# Patient Record
Sex: FEMALE | Race: ASIAN | NOT HISPANIC OR LATINO | Employment: UNEMPLOYED
[De-identification: names, ages, dates, MRNs, and addresses within clinical notes are randomized per-mention and may not be internally consistent; named-entity substitution may affect disease eponyms.]

---

## 2017-01-13 ENCOUNTER — RECORDS - HEALTHEAST (OUTPATIENT)
Dept: ADMINISTRATIVE | Facility: OTHER | Age: 52
End: 2017-01-13

## 2017-05-05 ENCOUNTER — RECORDS - HEALTHEAST (OUTPATIENT)
Dept: ADMINISTRATIVE | Facility: OTHER | Age: 52
End: 2017-05-05

## 2017-09-15 ENCOUNTER — RECORDS - HEALTHEAST (OUTPATIENT)
Dept: ADMINISTRATIVE | Facility: OTHER | Age: 52
End: 2017-09-15

## 2017-12-22 ENCOUNTER — RECORDS - HEALTHEAST (OUTPATIENT)
Dept: ADMINISTRATIVE | Facility: OTHER | Age: 52
End: 2017-12-22

## 2018-05-11 ENCOUNTER — OFFICE VISIT - HEALTHEAST (OUTPATIENT)
Dept: FAMILY MEDICINE | Facility: CLINIC | Age: 53
End: 2018-05-11

## 2018-05-11 DIAGNOSIS — Z76.89 ENCOUNTER TO ESTABLISH CARE: ICD-10-CM

## 2018-05-11 DIAGNOSIS — M54.6 THORACIC BACK PAIN: ICD-10-CM

## 2018-05-11 ASSESSMENT — MIFFLIN-ST. JEOR: SCORE: 1157.91

## 2018-06-22 ENCOUNTER — OFFICE VISIT - HEALTHEAST (OUTPATIENT)
Dept: FAMILY MEDICINE | Facility: CLINIC | Age: 53
End: 2018-06-22

## 2018-06-22 ENCOUNTER — COMMUNICATION - HEALTHEAST (OUTPATIENT)
Dept: FAMILY MEDICINE | Facility: CLINIC | Age: 53
End: 2018-06-22

## 2018-06-22 ENCOUNTER — RECORDS - HEALTHEAST (OUTPATIENT)
Dept: GENERAL RADIOLOGY | Facility: CLINIC | Age: 53
End: 2018-06-22

## 2018-06-22 DIAGNOSIS — Z12.11 SCREEN FOR COLON CANCER: ICD-10-CM

## 2018-06-22 DIAGNOSIS — Z12.31 VISIT FOR SCREENING MAMMOGRAM: ICD-10-CM

## 2018-06-22 DIAGNOSIS — M54.6 MIDLINE THORACIC BACK PAIN: ICD-10-CM

## 2018-06-22 DIAGNOSIS — Z00.00 PHYSICAL EXAM: ICD-10-CM

## 2018-06-22 DIAGNOSIS — Z13.220 SCREENING FOR LIPID DISORDERS: ICD-10-CM

## 2018-06-22 DIAGNOSIS — R20.8 BURNING SENSATION: ICD-10-CM

## 2018-06-22 DIAGNOSIS — Z78.0 MENOPAUSE: ICD-10-CM

## 2018-06-22 DIAGNOSIS — I10 HYPERTENSION: ICD-10-CM

## 2018-06-22 DIAGNOSIS — M54.6 PAIN IN THORACIC SPINE: ICD-10-CM

## 2018-06-22 LAB
ALBUMIN SERPL-MCNC: 3.7 G/DL (ref 3.5–5)
ALP SERPL-CCNC: 80 U/L (ref 45–120)
ALT SERPL W P-5'-P-CCNC: 17 U/L (ref 0–45)
ANION GAP SERPL CALCULATED.3IONS-SCNC: 10 MMOL/L (ref 5–18)
AST SERPL W P-5'-P-CCNC: 18 U/L (ref 0–40)
BILIRUB SERPL-MCNC: 0.2 MG/DL (ref 0–1)
BUN SERPL-MCNC: 30 MG/DL (ref 8–22)
CALCIUM SERPL-MCNC: 9.3 MG/DL (ref 8.5–10.5)
CHLORIDE BLD-SCNC: 105 MMOL/L (ref 98–107)
CHOLEST SERPL-MCNC: 292 MG/DL
CO2 SERPL-SCNC: 26 MMOL/L (ref 22–31)
CREAT SERPL-MCNC: 1.39 MG/DL (ref 0.6–1.1)
FASTING STATUS PATIENT QL REPORTED: ABNORMAL
GFR SERPL CREATININE-BSD FRML MDRD: 40 ML/MIN/1.73M2
GLUCOSE BLD-MCNC: 98 MG/DL (ref 70–125)
HDLC SERPL-MCNC: 57 MG/DL
LDLC SERPL CALC-MCNC: 205 MG/DL
POTASSIUM BLD-SCNC: 4.2 MMOL/L (ref 3.5–5)
PROT SERPL-MCNC: 7.5 G/DL (ref 6–8)
SODIUM SERPL-SCNC: 141 MMOL/L (ref 136–145)
TRIGL SERPL-MCNC: 152 MG/DL
TSH SERPL DL<=0.005 MIU/L-ACNC: 1.28 UIU/ML (ref 0.3–5)

## 2018-06-22 ASSESSMENT — MIFFLIN-ST. JEOR: SCORE: 1150.2

## 2018-06-25 ENCOUNTER — COMMUNICATION - HEALTHEAST (OUTPATIENT)
Dept: FAMILY MEDICINE | Facility: CLINIC | Age: 53
End: 2018-06-25

## 2018-07-13 ENCOUNTER — HOSPITAL ENCOUNTER (OUTPATIENT)
Dept: MAMMOGRAPHY | Facility: CLINIC | Age: 53
Discharge: HOME | End: 2018-07-13
Attending: FAMILY MEDICINE

## 2018-07-13 ENCOUNTER — COMMUNICATION - HEALTHEAST (OUTPATIENT)
Dept: TELEHEALTH | Facility: CLINIC | Age: 53
End: 2018-07-13

## 2018-07-13 ENCOUNTER — COMMUNICATION - HEALTHEAST (OUTPATIENT)
Dept: FAMILY MEDICINE | Facility: CLINIC | Age: 53
End: 2018-07-13

## 2018-07-13 DIAGNOSIS — Z12.31 VISIT FOR SCREENING MAMMOGRAM: ICD-10-CM

## 2018-09-11 ENCOUNTER — OFFICE VISIT - HEALTHEAST (OUTPATIENT)
Dept: FAMILY MEDICINE | Facility: CLINIC | Age: 53
End: 2018-09-11

## 2018-09-11 DIAGNOSIS — R19.7 DIARRHEA: ICD-10-CM

## 2018-09-11 DIAGNOSIS — R11.0 NAUSEA: ICD-10-CM

## 2018-09-14 ENCOUNTER — COMMUNICATION - HEALTHEAST (OUTPATIENT)
Dept: LAB | Facility: CLINIC | Age: 53
End: 2018-09-14

## 2018-09-14 ENCOUNTER — AMBULATORY - HEALTHEAST (OUTPATIENT)
Dept: LAB | Facility: CLINIC | Age: 53
End: 2018-09-14

## 2018-09-14 DIAGNOSIS — R19.7 DIARRHEA: ICD-10-CM

## 2018-09-15 LAB
SHIGA TOXIN 1: NEGATIVE
SHIGA TOXIN 2: NEGATIVE

## 2018-09-17 LAB
BACTERIA SPEC CULT: NORMAL
O+P STL MICRO: NORMAL

## 2019-04-12 ENCOUNTER — OFFICE VISIT - HEALTHEAST (OUTPATIENT)
Dept: FAMILY MEDICINE | Facility: CLINIC | Age: 54
End: 2019-04-12

## 2019-04-12 DIAGNOSIS — I10 ESSENTIAL HYPERTENSION: ICD-10-CM

## 2019-04-12 DIAGNOSIS — Z12.11 SCREEN FOR COLON CANCER: ICD-10-CM

## 2019-04-12 DIAGNOSIS — E78.2 MIXED HYPERLIPIDEMIA: ICD-10-CM

## 2019-04-12 LAB
ANION GAP SERPL CALCULATED.3IONS-SCNC: 12 MMOL/L (ref 5–18)
BUN SERPL-MCNC: 37 MG/DL (ref 8–22)
CALCIUM SERPL-MCNC: 9.7 MG/DL (ref 8.5–10.5)
CHLORIDE BLD-SCNC: 107 MMOL/L (ref 98–107)
CHOLEST SERPL-MCNC: 297 MG/DL
CO2 SERPL-SCNC: 22 MMOL/L (ref 22–31)
CREAT SERPL-MCNC: 1.37 MG/DL (ref 0.6–1.1)
FASTING STATUS PATIENT QL REPORTED: YES
GFR SERPL CREATININE-BSD FRML MDRD: 41 ML/MIN/1.73M2
GLUCOSE BLD-MCNC: 84 MG/DL (ref 70–125)
HDLC SERPL-MCNC: 68 MG/DL
LDLC SERPL CALC-MCNC: 211 MG/DL
POTASSIUM BLD-SCNC: 4.6 MMOL/L (ref 3.5–5)
SODIUM SERPL-SCNC: 141 MMOL/L (ref 136–145)
TRIGL SERPL-MCNC: 89 MG/DL

## 2019-04-12 RX ORDER — ATORVASTATIN CALCIUM 40 MG/1
40 TABLET, FILM COATED ORAL AT BEDTIME
Qty: 90 TABLET | Refills: 3 | Status: SHIPPED | OUTPATIENT
Start: 2019-04-12 | End: 2021-08-06

## 2019-04-15 ENCOUNTER — COMMUNICATION - HEALTHEAST (OUTPATIENT)
Dept: PEDIATRICS | Facility: CLINIC | Age: 54
End: 2019-04-15

## 2019-08-02 ENCOUNTER — OFFICE VISIT - HEALTHEAST (OUTPATIENT)
Dept: FAMILY MEDICINE | Facility: CLINIC | Age: 54
End: 2019-08-02

## 2019-08-02 DIAGNOSIS — R80.8 OTHER PROTEINURIA: ICD-10-CM

## 2019-08-02 DIAGNOSIS — E66.3 OVERWEIGHT (BMI 25.0-29.9): ICD-10-CM

## 2019-08-02 DIAGNOSIS — N95.1 MENOPAUSAL SYNDROME (HOT FLASHES): ICD-10-CM

## 2019-08-02 DIAGNOSIS — Z12.31 VISIT FOR SCREENING MAMMOGRAM: ICD-10-CM

## 2019-08-02 DIAGNOSIS — N18.30 CKD (CHRONIC KIDNEY DISEASE) STAGE 3, GFR 30-59 ML/MIN (H): ICD-10-CM

## 2019-08-02 DIAGNOSIS — E78.49 OTHER HYPERLIPIDEMIA: ICD-10-CM

## 2019-08-02 LAB
ALBUMIN SERPL-MCNC: 3.9 G/DL (ref 3.5–5)
ALBUMIN UR-MCNC: ABNORMAL MG/DL
ALP SERPL-CCNC: 125 U/L (ref 45–120)
ALT SERPL W P-5'-P-CCNC: 38 U/L (ref 0–45)
ANION GAP SERPL CALCULATED.3IONS-SCNC: 12 MMOL/L (ref 5–18)
APPEARANCE UR: CLEAR
AST SERPL W P-5'-P-CCNC: 28 U/L (ref 0–40)
BACTERIA #/AREA URNS HPF: ABNORMAL HPF
BILIRUB SERPL-MCNC: 0.2 MG/DL (ref 0–1)
BILIRUB UR QL STRIP: NEGATIVE
BUN SERPL-MCNC: 35 MG/DL (ref 8–22)
CALCIUM SERPL-MCNC: 9.4 MG/DL (ref 8.5–10.5)
CHLORIDE BLD-SCNC: 107 MMOL/L (ref 98–107)
CO2 SERPL-SCNC: 21 MMOL/L (ref 22–31)
COLOR UR AUTO: YELLOW
CREAT SERPL-MCNC: 1.77 MG/DL (ref 0.6–1.1)
CREAT UR-MCNC: 38.3 MG/DL
ERYTHROCYTE [DISTWIDTH] IN BLOOD BY AUTOMATED COUNT: 12.3 % (ref 11–14.5)
ERYTHROCYTE [SEDIMENTATION RATE] IN BLOOD BY WESTERGREN METHOD: 34 MM/HR (ref 0–20)
GFR SERPL CREATININE-BSD FRML MDRD: 30 ML/MIN/1.73M2
GLUCOSE BLD-MCNC: 99 MG/DL (ref 70–125)
GLUCOSE UR STRIP-MCNC: NEGATIVE MG/DL
HBA1C MFR BLD: 5.5 % (ref 3.5–6)
HCT VFR BLD AUTO: 35.6 % (ref 35–47)
HGB BLD-MCNC: 12.1 G/DL (ref 12–16)
HGB UR QL STRIP: ABNORMAL
KETONES UR STRIP-MCNC: NEGATIVE MG/DL
LDLC SERPL CALC-MCNC: 91 MG/DL
LEUKOCYTE ESTERASE UR QL STRIP: ABNORMAL
MCH RBC QN AUTO: 28.7 PG (ref 27–34)
MCHC RBC AUTO-ENTMCNC: 33.9 G/DL (ref 32–36)
MCV RBC AUTO: 85 FL (ref 80–100)
NITRATE UR QL: NEGATIVE
PH UR STRIP: 6 [PH] (ref 5–8)
PLATELET # BLD AUTO: 248 THOU/UL (ref 140–440)
PMV BLD AUTO: 7.9 FL (ref 7–10)
POTASSIUM BLD-SCNC: 4.6 MMOL/L (ref 3.5–5)
PROT SERPL-MCNC: 7.6 G/DL (ref 6–8)
PROTEIN, RANDOM URINE - HISTORICAL: 91 MG/DL
PROTEIN/CREAT RATIO, RANDOM UR: 2.38
RBC # BLD AUTO: 4.21 MILL/UL (ref 3.8–5.4)
RBC #/AREA URNS AUTO: ABNORMAL HPF
SODIUM SERPL-SCNC: 140 MMOL/L (ref 136–145)
SP GR UR STRIP: 1.01 (ref 1–1.03)
SQUAMOUS #/AREA URNS AUTO: ABNORMAL LPF
TSH SERPL DL<=0.005 MIU/L-ACNC: 1.9 UIU/ML (ref 0.3–5)
UROBILINOGEN UR STRIP-ACNC: ABNORMAL
WBC #/AREA URNS AUTO: ABNORMAL HPF
WBC: 8.2 THOU/UL (ref 4–11)

## 2019-08-02 ASSESSMENT — MIFFLIN-ST. JEOR: SCORE: 1153.82

## 2019-08-03 LAB — HBV SURFACE AG SERPL QL IA: NEGATIVE

## 2019-08-05 ENCOUNTER — COMMUNICATION - HEALTHEAST (OUTPATIENT)
Dept: FAMILY MEDICINE | Facility: CLINIC | Age: 54
End: 2019-08-05

## 2019-08-05 LAB
ANA SER QL: 0.4 U
GAMMA INTERFERON BACKGROUND BLD IA-ACNC: 0.08 IU/ML
M TB IFN-G BLD-IMP: NEGATIVE
MITOGEN IGNF BCKGRD COR BLD-ACNC: -0.01 IU/ML
MITOGEN IGNF BCKGRD COR BLD-ACNC: -0.02 IU/ML
QTF INTERPRETATION: NORMAL
QTF MITOGEN - NIL: >10 IU/ML

## 2019-08-16 ENCOUNTER — HOSPITAL ENCOUNTER (OUTPATIENT)
Dept: ULTRASOUND IMAGING | Facility: CLINIC | Age: 54
Discharge: HOME | End: 2019-08-16
Attending: FAMILY MEDICINE

## 2019-08-16 DIAGNOSIS — N18.30 CKD (CHRONIC KIDNEY DISEASE) STAGE 3, GFR 30-59 ML/MIN (H): ICD-10-CM

## 2019-08-19 ENCOUNTER — COMMUNICATION - HEALTHEAST (OUTPATIENT)
Dept: FAMILY MEDICINE | Facility: CLINIC | Age: 54
End: 2019-08-19

## 2019-08-19 ENCOUNTER — AMBULATORY - HEALTHEAST (OUTPATIENT)
Dept: FAMILY MEDICINE | Facility: CLINIC | Age: 54
End: 2019-08-19

## 2019-08-19 DIAGNOSIS — N18.30 CKD (CHRONIC KIDNEY DISEASE) STAGE 3, GFR 30-59 ML/MIN (H): ICD-10-CM

## 2019-08-21 NOTE — TELEPHONE ENCOUNTER
RECORDS RECEIVED FROM: CKD per [facility], refd by Dr. Zi Loya, med recs in care everywhere   DATE RECEIVED: 2019   NOTES STATUS DETAILS   OFFICE NOTE from referring provider N/A    OFFICE NOTE from other specialist  Care Everywhere 2019   *Only VASCULITIS or LUPUS gather office notes for the following N/A    *PULMONARY   N/A    *ENT N/A    *DERMATOLOGY N/A    *RHEUMATOLOGY N/A    DISCHARGE SUMMARY from hospital N/A    DISCHARGE REPORT from the ER N/A    MEDICATION LIST N/A    IMAGING  (NEED IMAGES AND REPORTS)     KIDNEY CT SCAN N/A    KIDNEY ULTRASOUND Care Everywhere 2019   MR ABDOMEN N/A    NUCLEAR MEDICINE RENAL N/A    LABS     CBC N/A    CMP Care Everywhere 2019   BMP Care Everywhere 2019   UA Care Everywhere 2019   URINE PROTEIN Care Everywhere 2019   RENAL PANEL N/A    BIOPSY     KIDNEY BIOPSY  N/A       Action    Action Taken Sending over fax request to [facility] regarding referral         Medical Records Request For Appointment  URGENT!            To: [facility] Medical Records From: Noy Gaffney - Clinic   [facility]   Fax: [phone] Fax: [phone]   Date: 2019   Phone: [phone]   Pages: 1 Mailing Address: [facility]  Attn: Noy Gaffney, Clinic   [address] [place] [address], mail code [id number]  [place] [address]     PATIENT: Missy Vance  : 1965  REFFERRED BY: Dr. Zi Loya,     We just need the referral, recs and labs are in care everywhere.    Please fax medical records to fax # [phone] for patient s upcoming appointment in the Nephrology Clinic.     Patient is being seen for: CKD    PLEASE SEND:  TIME FRAME NEEDED:      Referring MD notes, office visit notes with any other related providers  3 to 4 years     Related Biopsy Reports All Dates     ED/UC/Hospital discharge notes 3 to 4 years     Medication List Current     Any Related Imaging:   MRI, CT,  and US           *Reports and images*   3-4 years    Please push images to [facility] [employer] PACS or mail the imaging disc to the address listed above.  NON URGENT mail to the above address via [employer]       ** If no records related to Nephrology, please send most recent lab work and physical. **          Confidentiality Notice:  The document(s) accompanying this fax may contain protected health information under state and federal law and is legally privileged.  The information is only for the use of the intended recipient named above.  The recipient or person responsible for delivering this information is prohibited by law from disclosing this information without proper authorization to any other party, unless required to do so by law or regulation.  If you are not the intended recipient, you are hereby notified that any review, dissemination, distribution, or copying of this message is strictly prohibited.  If you have received this communication in error, please notify us immediately by telephone to arrange for the return or destruction of the faxed documents.  Thank you.

## 2019-08-23 ENCOUNTER — OFFICE VISIT - HEALTHEAST (OUTPATIENT)
Dept: FAMILY MEDICINE | Facility: CLINIC | Age: 54
End: 2019-08-23

## 2019-08-23 DIAGNOSIS — E78.49 OTHER HYPERLIPIDEMIA: ICD-10-CM

## 2019-08-23 DIAGNOSIS — N28.1 RENAL CYST, LEFT: ICD-10-CM

## 2019-08-23 DIAGNOSIS — Z12.11 COLON CANCER SCREENING: ICD-10-CM

## 2019-08-23 DIAGNOSIS — I10 ESSENTIAL HYPERTENSION: ICD-10-CM

## 2019-08-23 DIAGNOSIS — N18.30 CKD (CHRONIC KIDNEY DISEASE) STAGE 3, GFR 30-59 ML/MIN (H): ICD-10-CM

## 2019-08-23 ASSESSMENT — MIFFLIN-ST. JEOR: SCORE: 1158.81

## 2019-09-12 ENCOUNTER — COMMUNICATION - HEALTHEAST (OUTPATIENT)
Dept: FAMILY MEDICINE | Facility: CLINIC | Age: 54
End: 2019-09-12

## 2019-09-19 ENCOUNTER — COMMUNICATION - HEALTHEAST (OUTPATIENT)
Dept: FAMILY MEDICINE | Facility: CLINIC | Age: 54
End: 2019-09-19

## 2019-09-24 ENCOUNTER — DOCUMENTATION ONLY (OUTPATIENT)
Dept: CARE COORDINATION | Facility: CLINIC | Age: 54
End: 2019-09-24

## 2019-10-04 ENCOUNTER — AMBULATORY - HEALTHEAST (OUTPATIENT)
Dept: FAMILY MEDICINE | Facility: CLINIC | Age: 54
End: 2019-10-04

## 2019-10-04 DIAGNOSIS — I10 ESSENTIAL HYPERTENSION: ICD-10-CM

## 2019-10-11 ENCOUNTER — AMBULATORY - HEALTHEAST (OUTPATIENT)
Dept: LAB | Facility: CLINIC | Age: 54
End: 2019-10-11

## 2019-10-11 DIAGNOSIS — N18.30 CKD (CHRONIC KIDNEY DISEASE) STAGE 3, GFR 30-59 ML/MIN (H): ICD-10-CM

## 2019-10-11 LAB
ANION GAP SERPL CALCULATED.3IONS-SCNC: 11 MMOL/L (ref 5–18)
BUN SERPL-MCNC: 54 MG/DL (ref 8–22)
CALCIUM SERPL-MCNC: 9.3 MG/DL (ref 8.5–10.5)
CHLORIDE BLD-SCNC: 109 MMOL/L (ref 98–107)
CO2 SERPL-SCNC: 19 MMOL/L (ref 22–31)
CREAT SERPL-MCNC: 2.04 MG/DL (ref 0.6–1.1)
CREAT UR-MCNC: 35.8 MG/DL
GFR SERPL CREATININE-BSD FRML MDRD: 26 ML/MIN/1.73M2
GLUCOSE BLD-MCNC: 99 MG/DL (ref 70–125)
POTASSIUM BLD-SCNC: 4.6 MMOL/L (ref 3.5–5)
PROTEIN, RANDOM URINE - HISTORICAL: 51 MG/DL
PROTEIN/CREAT RATIO, RANDOM UR: 1.42
SODIUM SERPL-SCNC: 139 MMOL/L (ref 136–145)

## 2019-10-16 ENCOUNTER — COMMUNICATION - HEALTHEAST (OUTPATIENT)
Dept: FAMILY MEDICINE | Facility: CLINIC | Age: 54
End: 2019-10-16

## 2019-11-13 ENCOUNTER — PRE VISIT (OUTPATIENT)
Dept: NEPHROLOGY | Facility: CLINIC | Age: 54
End: 2019-11-13

## 2019-11-13 ENCOUNTER — APPOINTMENT (OUTPATIENT)
Dept: NEPHROLOGY | Facility: CLINIC | Age: 54
End: 2019-11-13
Attending: FAMILY MEDICINE
Payer: COMMERCIAL

## 2020-01-17 ENCOUNTER — OFFICE VISIT - HEALTHEAST (OUTPATIENT)
Dept: FAMILY MEDICINE | Facility: CLINIC | Age: 55
End: 2020-01-17

## 2020-01-17 ENCOUNTER — RECORDS - HEALTHEAST (OUTPATIENT)
Dept: MAMMOGRAPHY | Facility: CLINIC | Age: 55
End: 2020-01-17

## 2020-01-17 DIAGNOSIS — I10 ESSENTIAL HYPERTENSION: ICD-10-CM

## 2020-01-17 DIAGNOSIS — Z12.11 COLON CANCER SCREENING: ICD-10-CM

## 2020-01-17 DIAGNOSIS — Z12.31 VISIT FOR SCREENING MAMMOGRAM: ICD-10-CM

## 2020-01-17 DIAGNOSIS — Z00.00 HEALTH CARE MAINTENANCE: ICD-10-CM

## 2020-01-17 DIAGNOSIS — N18.30 CKD (CHRONIC KIDNEY DISEASE) STAGE 3, GFR 30-59 ML/MIN (H): ICD-10-CM

## 2020-01-17 DIAGNOSIS — Z12.4 CERVICAL CANCER SCREENING: ICD-10-CM

## 2020-01-17 DIAGNOSIS — N89.8 VAGINAL DISCHARGE: ICD-10-CM

## 2020-01-17 DIAGNOSIS — Z12.31 ENCOUNTER FOR SCREENING MAMMOGRAM FOR MALIGNANT NEOPLASM OF BREAST: ICD-10-CM

## 2020-01-17 DIAGNOSIS — E78.49 OTHER HYPERLIPIDEMIA: ICD-10-CM

## 2020-01-17 LAB
ALBUMIN SERPL-MCNC: 3.8 G/DL (ref 3.5–5)
ALBUMIN UR-MCNC: ABNORMAL MG/DL
ALP SERPL-CCNC: 112 U/L (ref 45–120)
ALT SERPL W P-5'-P-CCNC: 22 U/L (ref 0–45)
ANION GAP SERPL CALCULATED.3IONS-SCNC: 11 MMOL/L (ref 5–18)
APPEARANCE UR: CLEAR
AST SERPL W P-5'-P-CCNC: 19 U/L (ref 0–40)
BACTERIA #/AREA URNS HPF: ABNORMAL HPF
BILIRUB SERPL-MCNC: 0.2 MG/DL (ref 0–1)
BILIRUB UR QL STRIP: NEGATIVE
BUN SERPL-MCNC: 37 MG/DL (ref 8–22)
CALCIUM SERPL-MCNC: 9.3 MG/DL (ref 8.5–10.5)
CHLORIDE BLD-SCNC: 107 MMOL/L (ref 98–107)
CHOLEST SERPL-MCNC: 211 MG/DL
CLUE CELLS: NORMAL
CO2 SERPL-SCNC: 24 MMOL/L (ref 22–31)
COLOR UR AUTO: YELLOW
CREAT SERPL-MCNC: 1.81 MG/DL (ref 0.6–1.1)
CREAT UR-MCNC: 44 MG/DL
FASTING STATUS PATIENT QL REPORTED: NO
GFR SERPL CREATININE-BSD FRML MDRD: 29 ML/MIN/1.73M2
GLUCOSE BLD-MCNC: 86 MG/DL (ref 70–125)
GLUCOSE UR STRIP-MCNC: NEGATIVE MG/DL
HBA1C MFR BLD: 5.8 % (ref 3.5–6)
HDLC SERPL-MCNC: 73 MG/DL
HGB UR QL STRIP: ABNORMAL
HIV 1+2 AB+HIV1 P24 AG SERPL QL IA: NEGATIVE
KETONES UR STRIP-MCNC: NEGATIVE MG/DL
LDLC SERPL CALC-MCNC: 125 MG/DL
LEUKOCYTE ESTERASE UR QL STRIP: ABNORMAL
MUCOUS THREADS #/AREA URNS LPF: ABNORMAL LPF
NITRATE UR QL: NEGATIVE
PH UR STRIP: 5.5 [PH] (ref 5–8)
POTASSIUM BLD-SCNC: 4.3 MMOL/L (ref 3.5–5)
PROT SERPL-MCNC: 7.7 G/DL (ref 6–8)
PROTEIN, RANDOM URINE - HISTORICAL: 127 MG/DL
PROTEIN/CREAT RATIO, RANDOM UR: 2.89
RBC #/AREA URNS AUTO: ABNORMAL HPF
SODIUM SERPL-SCNC: 142 MMOL/L (ref 136–145)
SP GR UR STRIP: 1.01 (ref 1–1.03)
SQUAMOUS #/AREA URNS AUTO: ABNORMAL LPF
TRICHOMONAS, WET PREP: NORMAL
TRIGL SERPL-MCNC: 65 MG/DL
UROBILINOGEN UR STRIP-ACNC: ABNORMAL
WBC #/AREA URNS AUTO: ABNORMAL HPF
YEAST, WET PREP: NORMAL

## 2020-01-17 ASSESSMENT — MIFFLIN-ST. JEOR: SCORE: 1140.67

## 2020-01-18 LAB — BACTERIA SPEC CULT: NO GROWTH

## 2020-01-20 LAB
C TRACH DNA SPEC QL PROBE+SIG AMP: NEGATIVE
HPV SOURCE: NORMAL
HUMAN PAPILLOMA VIRUS 16 DNA: NEGATIVE
HUMAN PAPILLOMA VIRUS 18 DNA: NEGATIVE
HUMAN PAPILLOMA VIRUS FINAL DIAGNOSIS: NORMAL
HUMAN PAPILLOMA VIRUS OTHER HR: NEGATIVE
N GONORRHOEA DNA SPEC QL NAA+PROBE: NEGATIVE
SPECIMEN DESCRIPTION: NORMAL

## 2020-01-22 ENCOUNTER — COMMUNICATION - HEALTHEAST (OUTPATIENT)
Dept: FAMILY MEDICINE | Facility: CLINIC | Age: 55
End: 2020-01-22

## 2020-01-27 ENCOUNTER — COMMUNICATION - HEALTHEAST (OUTPATIENT)
Dept: FAMILY MEDICINE | Facility: CLINIC | Age: 55
End: 2020-01-27

## 2020-04-24 ENCOUNTER — RECORDS - HEALTHEAST (OUTPATIENT)
Dept: ADMINISTRATIVE | Facility: OTHER | Age: 55
End: 2020-04-24

## 2020-06-12 ENCOUNTER — COMMUNICATION - HEALTHEAST (OUTPATIENT)
Dept: FAMILY MEDICINE | Facility: CLINIC | Age: 55
End: 2020-06-12

## 2020-09-15 ENCOUNTER — COMMUNICATION - HEALTHEAST (OUTPATIENT)
Dept: FAMILY MEDICINE | Facility: CLINIC | Age: 55
End: 2020-09-15

## 2020-09-15 DIAGNOSIS — I10 ESSENTIAL HYPERTENSION: ICD-10-CM

## 2020-11-20 ENCOUNTER — RECORDS - HEALTHEAST (OUTPATIENT)
Dept: ADMINISTRATIVE | Facility: OTHER | Age: 55
End: 2020-11-20

## 2021-01-25 ENCOUNTER — AMBULATORY - HEALTHEAST (OUTPATIENT)
Dept: FAMILY MEDICINE | Facility: CLINIC | Age: 56
End: 2021-01-25

## 2021-01-25 ENCOUNTER — COMMUNICATION - HEALTHEAST (OUTPATIENT)
Dept: SCHEDULING | Facility: CLINIC | Age: 56
End: 2021-01-25

## 2021-01-25 ENCOUNTER — OFFICE VISIT - HEALTHEAST (OUTPATIENT)
Dept: FAMILY MEDICINE | Facility: CLINIC | Age: 56
End: 2021-01-25

## 2021-01-25 DIAGNOSIS — N18.32 STAGE 3B CHRONIC KIDNEY DISEASE (H): ICD-10-CM

## 2021-01-25 DIAGNOSIS — I10 ESSENTIAL HYPERTENSION: ICD-10-CM

## 2021-01-25 DIAGNOSIS — E78.49 OTHER HYPERLIPIDEMIA: ICD-10-CM

## 2021-01-25 DIAGNOSIS — R73.03 PREDIABETES: ICD-10-CM

## 2021-01-25 DIAGNOSIS — Z20.822 EXPOSURE TO COVID-19 VIRUS: ICD-10-CM

## 2021-01-25 DIAGNOSIS — Z20.822 SUSPECTED COVID-19 VIRUS INFECTION: ICD-10-CM

## 2021-01-25 ASSESSMENT — PATIENT HEALTH QUESTIONNAIRE - PHQ9: SUM OF ALL RESPONSES TO PHQ QUESTIONS 1-9: 7

## 2021-01-27 ENCOUNTER — COMMUNICATION - HEALTHEAST (OUTPATIENT)
Dept: SCHEDULING | Facility: CLINIC | Age: 56
End: 2021-01-27

## 2021-01-28 ENCOUNTER — COMMUNICATION - HEALTHEAST (OUTPATIENT)
Dept: FAMILY MEDICINE | Facility: CLINIC | Age: 56
End: 2021-01-28

## 2021-01-28 ENCOUNTER — COMMUNICATION - HEALTHEAST (OUTPATIENT)
Dept: SCHEDULING | Facility: CLINIC | Age: 56
End: 2021-01-28

## 2021-02-26 ENCOUNTER — OFFICE VISIT - HEALTHEAST (OUTPATIENT)
Dept: FAMILY MEDICINE | Facility: CLINIC | Age: 56
End: 2021-02-26

## 2021-02-26 DIAGNOSIS — Z12.11 COLON CANCER SCREENING: ICD-10-CM

## 2021-02-26 DIAGNOSIS — N18.32 STAGE 3B CHRONIC KIDNEY DISEASE (H): ICD-10-CM

## 2021-02-26 DIAGNOSIS — N28.1 RENAL CYST, LEFT: ICD-10-CM

## 2021-02-26 DIAGNOSIS — Z11.59 SCREENING FOR VIRAL DISEASE: ICD-10-CM

## 2021-02-26 DIAGNOSIS — R73.03 PREDIABETES: ICD-10-CM

## 2021-02-26 DIAGNOSIS — E66.3 OVERWEIGHT: ICD-10-CM

## 2021-02-26 DIAGNOSIS — I10 ESSENTIAL HYPERTENSION: ICD-10-CM

## 2021-02-26 DIAGNOSIS — D64.9 ANEMIA, UNSPECIFIED TYPE: ICD-10-CM

## 2021-02-26 DIAGNOSIS — R29.898 NECK TIGHTNESS: ICD-10-CM

## 2021-02-26 DIAGNOSIS — R80.8 OTHER PROTEINURIA: ICD-10-CM

## 2021-02-26 DIAGNOSIS — Z12.31 VISIT FOR SCREENING MAMMOGRAM: ICD-10-CM

## 2021-02-26 DIAGNOSIS — E78.49 OTHER HYPERLIPIDEMIA: ICD-10-CM

## 2021-02-26 LAB
ALBUMIN SERPL-MCNC: 3.7 G/DL (ref 3.5–5)
ALBUMIN UR-MCNC: ABNORMAL MG/DL
ALP SERPL-CCNC: 99 U/L (ref 45–120)
ALT SERPL W P-5'-P-CCNC: 17 U/L (ref 0–45)
ANION GAP SERPL CALCULATED.3IONS-SCNC: 10 MMOL/L (ref 5–18)
APPEARANCE UR: CLEAR
AST SERPL W P-5'-P-CCNC: 13 U/L (ref 0–40)
BACTERIA #/AREA URNS HPF: ABNORMAL HPF
BILIRUB SERPL-MCNC: 0.2 MG/DL (ref 0–1)
BILIRUB UR QL STRIP: NEGATIVE
BUN SERPL-MCNC: 38 MG/DL (ref 8–22)
CALCIUM SERPL-MCNC: 9.1 MG/DL (ref 8.5–10.5)
CHLORIDE BLD-SCNC: 110 MMOL/L (ref 98–107)
CHOLEST SERPL-MCNC: 290 MG/DL
CO2 SERPL-SCNC: 21 MMOL/L (ref 22–31)
COLOR UR AUTO: YELLOW
CREAT SERPL-MCNC: 1.65 MG/DL (ref 0.6–1.1)
CREAT UR-MCNC: 34.5 MG/DL
ERYTHROCYTE [DISTWIDTH] IN BLOOD BY AUTOMATED COUNT: 13.3 % (ref 11–14.5)
FASTING STATUS PATIENT QL REPORTED: YES
GFR SERPL CREATININE-BSD FRML MDRD: 33 ML/MIN/1.73M2
GLUCOSE BLD-MCNC: 91 MG/DL (ref 70–125)
GLUCOSE UR STRIP-MCNC: NEGATIVE MG/DL
HBA1C MFR BLD: 5.5 %
HCT VFR BLD AUTO: 35.5 % (ref 35–47)
HCV AB SERPL QL IA: NEGATIVE
HDLC SERPL-MCNC: 67 MG/DL
HGB BLD-MCNC: 11.6 G/DL (ref 12–16)
HGB UR QL STRIP: ABNORMAL
IRON SATN MFR SERPL: 21 % (ref 20–50)
IRON SERPL-MCNC: 56 UG/DL (ref 42–175)
KETONES UR STRIP-MCNC: NEGATIVE MG/DL
LDLC SERPL CALC-MCNC: 203 MG/DL
LEUKOCYTE ESTERASE UR QL STRIP: ABNORMAL
MCH RBC QN AUTO: 28.1 PG (ref 27–34)
MCHC RBC AUTO-ENTMCNC: 32.7 G/DL (ref 32–36)
MCV RBC AUTO: 86 FL (ref 80–100)
MICROALBUMIN UR-MCNC: 75.6 MG/DL (ref 0–1.99)
MICROALBUMIN/CREAT UR: 2191.3 MG/G
NITRATE UR QL: NEGATIVE
PH UR STRIP: 5 [PH] (ref 5–8)
PLATELET # BLD AUTO: 248 THOU/UL (ref 140–440)
PMV BLD AUTO: 9.8 FL (ref 7–10)
POTASSIUM BLD-SCNC: 4.2 MMOL/L (ref 3.5–5)
PROT SERPL-MCNC: 7.5 G/DL (ref 6–8)
RBC # BLD AUTO: 4.13 MILL/UL (ref 3.8–5.4)
RBC #/AREA URNS AUTO: ABNORMAL HPF
SODIUM SERPL-SCNC: 141 MMOL/L (ref 136–145)
SP GR UR STRIP: 1.01 (ref 1–1.03)
SQUAMOUS #/AREA URNS AUTO: ABNORMAL LPF
TIBC SERPL-MCNC: 272 UG/DL (ref 313–563)
TRANSFERRIN SERPL-MCNC: 218 MG/DL (ref 212–360)
TRIGL SERPL-MCNC: 101 MG/DL
TSH SERPL DL<=0.005 MIU/L-ACNC: 1.74 UIU/ML (ref 0.3–5)
UROBILINOGEN UR STRIP-ACNC: ABNORMAL
WBC #/AREA URNS AUTO: ABNORMAL HPF
WBC: 8 THOU/UL (ref 4–11)

## 2021-02-26 RX ORDER — SODIUM BICARBONATE 650 MG/1
TABLET ORAL
Status: SHIPPED | COMMUNITY
Start: 2020-07-30 | End: 2022-04-07

## 2021-02-26 ASSESSMENT — MIFFLIN-ST. JEOR: SCORE: 1154.27

## 2021-03-05 ENCOUNTER — COMMUNICATION - HEALTHEAST (OUTPATIENT)
Dept: FAMILY MEDICINE | Facility: CLINIC | Age: 56
End: 2021-03-05

## 2021-03-19 ENCOUNTER — HOSPITAL ENCOUNTER (OUTPATIENT)
Dept: ULTRASOUND IMAGING | Facility: CLINIC | Age: 56
Discharge: HOME | End: 2021-03-19
Attending: FAMILY MEDICINE

## 2021-03-19 DIAGNOSIS — N28.1 RENAL CYST, LEFT: ICD-10-CM

## 2021-03-26 ENCOUNTER — AMBULATORY - HEALTHEAST (OUTPATIENT)
Dept: MULTI SPECIALTY CLINIC | Facility: CLINIC | Age: 56
End: 2021-03-26

## 2021-03-26 LAB — COLOGUARD-ABSTRACT: NEGATIVE

## 2021-05-26 ENCOUNTER — RECORDS - HEALTHEAST (OUTPATIENT)
Dept: ADMINISTRATIVE | Facility: CLINIC | Age: 56
End: 2021-05-26

## 2021-05-27 ASSESSMENT — PATIENT HEALTH QUESTIONNAIRE - PHQ9: SUM OF ALL RESPONSES TO PHQ QUESTIONS 1-9: 7

## 2021-05-27 NOTE — PROGRESS NOTES
Assessment/Plan:     1. Essential hypertension  Well controlled.  Continue medication at current dose.  BMP pending.  - amLODIPine-benazepril (LOTREL 5-10) 5-10 mg per capsule; Take 1 capsule by mouth daily.  Dispense: 90 capsule; Refill: 3  - Basic Metabolic Panel    2. Mixed hyperlipidemia  Repeat lipid panel today, but from previous values - I would recommend a statin medication.  Start Lipitor once daily at bedtime.  Discussed proper use and possible side effects.  Plan on repeat labs in 1 year.  Patient will let me know if she is having problems.   - Lipid Cascade FASTING  - atorvastatin (LIPITOR) 40 MG tablet; Take 1 tablet (40 mg total) by mouth at bedtime.  Dispense: 90 tablet; Refill: 3    3. Screen for colon cancer  Encouraged patient to schedule.  - Ambulatory referral for Colonoscopy        Subjective:     Missy Vance is a 53 y.o. female who presents for a blood pressure check and medication refill.  Patient is accompanied by an .  Currently on amlodipine/benazepril once a daily.  Patient is checking blood pressures at home with readings generally in the 120s over 60s.  She denies any chest pain, shortness of breath, headaches, lightheadedness, or dizziness.  She is generally very compliant with the medication.  We did review her labs from her physical last year with a another provider does appear that her cholesterol and LDL are quite elevated.  Patient tells me that she was on Lipitor at one point many years ago.  She is unclear has to why she stopped.  She believes she tolerated it well and did not specifically develop any myalgias.  Patient would be willing to restart this.    Due for a colonoscopy.  Order was placed, and I encouraged patient to schedule.      The following portions of the patient's history were reviewed and updated as appropriate: allergies, current medications.    Review of Systems  A comprehensive review of systems was performed and was otherwise  negative    Objective:     /68   Pulse 60   Wt 138 lb 11.2 oz (62.9 kg)   BMI 28.01 kg/m      General Appearance: Alert, cooperative, no distress, appears stated age  Neck: Supple, symmetrical, trachea midline, no adenopathy. No JVD.  Lungs: Clear to auscultation bilaterally, respirations unlabored  Heart: Regular rate and rhythm, S1 and S2 normal, no murmur, rub, or gallop    Valerie Wagstrom, NP

## 2021-05-31 NOTE — TELEPHONE ENCOUNTER
"Called and a lady picked up. Stated patient is at work. She wanted me to let her know. I inform her she is not on the CTC, so therefore I was not able to share any information with her. left message for pt to return call.# 1  \" Okay to relay message\"    "

## 2021-05-31 NOTE — TELEPHONE ENCOUNTER
"Attempted #1 Voice mail box is full unable to leave message. Will try again later.     \" Okay to relay message\"    "

## 2021-05-31 NOTE — TELEPHONE ENCOUNTER
"Called and left message on mobile phone and home number for pt to return call.# 2  \" Okay to relay message\"    "

## 2021-05-31 NOTE — TELEPHONE ENCOUNTER
Called patient with  [employer] ID# [id number], home number voice mail is full, called secondary number. Spoke to patient and relayed below message. The ultrasound is scheduled for 8/16/19 and has a follow up with Dr. Loya on 8/23/19. Patient expressed understanding and had no questions and will follow up with the appointments.

## 2021-05-31 NOTE — TELEPHONE ENCOUNTER
"Called and spoke to a lady and she stated call back at 4:30pm to speak to patient. Will try back at that time. \" Okay to relay message\"    "

## 2021-05-31 NOTE — PROGRESS NOTES
"Office Visit  [facility]  Date of Service: 08/02/2019      Subjective   Missy Vance is a 54 y.o. female who presents for Hot Flashes and Gynecologic Exam    Hot Flashes  Menopause symptoms for 3 years  LMP March 2017  Having hot flashes  Waist up only  Gets sweaty, then cold later  20-30 times per day  No cough  No fevers  No weight changes  No flushing  Gets night sweats, too    CKD  Not aware of having kidney problems  Brother on dialysis - doesn't know why  Mom had hypertension    Hyperlipidemia  Started cholesterol medicine a few months ago - atorvastatin 40   No known history of hyperlipidemia    Objective   /58 (Patient Site: Right Arm, Patient Position: Sitting, Cuff Size: Adult Regular)   Ht 4' 11\" (1.499 m)   Wt 142 lb 14.4 oz (64.8 kg)   LMP 03/01/2017   BMI 28.86 kg/m     She reports that she has never smoked. She has never used smokeless tobacco.  BP Readings from Last 6 Encounters:   08/02/19 128/58   04/12/19 110/68   09/11/18 110/66   06/22/18 118/62   05/11/18 122/74   12/31/17 134/78     Gen: Alert, no apparent distress.  Heart: Regular rate and rhythm, no murmurs.  Lungs: Clear to auscultation bilaterally, no increased work of breathing.  Abdomen: Soft, non-tender, non-distended, bowel sounds normal.  Extremities: No clubbing, cyanosis, edema.  Skin: normal color    Results for orders placed or performed in visit on 04/12/19   Basic Metabolic Panel   Result Value Ref Range    Sodium 141 136 - 145 mmol/L    Potassium 4.6 3.5 - 5.0 mmol/L    Chloride 107 98 - 107 mmol/L    CO2 22 22 - 31 mmol/L    Anion Gap, Calculation 12 5 - 18 mmol/L    Glucose 84 70 - 125 mg/dL    Calcium 9.7 8.5 - 10.5 mg/dL    BUN 37 (H) 8 - 22 mg/dL    Creatinine 1.37 (H) 0.60 - 1.10 mg/dL    GFR MDRD Af Amer 49 (L) >60 mL/min/1.73m2    GFR MDRD Non Af Amer 41 (L) >60 mL/min/1.73m2   Lipid Cascade FASTING   Result Value Ref Range    Cholesterol 297 (H) <=199 mg/dL    Triglycerides " 89 <=149 mg/dL    HDL Cholesterol 68 >=50 mg/dL    LDL Calculated 211 (H) <=129 mg/dL    Patient Fasting > 8hrs? Yes      No results found.    Assessment & Plan     1. Hot flashes, likely menopausal. Assess for TB, thyroid disease, hepatitis B infection.  2. CKD 3. Chart review shows history of proteinuria at previous clinic. She has history of hypertension, but it is a recent diagnosis and has been well-controlled. Younger brother has ESRD, on dialysis. She isn't sure why. Check: CMP, CBC, urine protein:cr, urinalysis, ESR, TADEO, and renal US.   3. Hyperlipidemia - elevated LDL. No FH of hyperlipidemia. On statin for a few months. Check direct LDL and CMP.  4. Health maintenance - schedule mammogram + physical.  5. Language barrier - patient prefers verbal communication. Cannot read letters sent to her home.  6. Patient requests me as PCP. She has family members who see me and would like to use same family doctor.      Order Summary                                                      1. Menopausal syndrome (hot flashes)  Thyroid Cascade    QTF-Mycobacterium tuberculosis by QuantiFERON-TB Gold Plus    Hepatitis B Surface Antigen (HBsAG)   2. Overweight (BMI 25.0-29.9)  Glycosylated Hemoglobin A1c   3. CKD (chronic kidney disease) stage 3, GFR 30-59 ml/min (H)  Urinalysis    Protein/Creatinine Ratio, Urine    HM2(CBC w/o Differential)    US Kidney Bilateral    Comprehensive Metabolic Panel   4. Visit for screening mammogram  Mammo Screening Bilateral   5. Other proteinuria     6. Other hyperlipidemia  LDL Cholesterol, Direct    Comprehensive Metabolic Panel      No future appointments.    Completed by: Myranda Loya M.D., [facility]. 8/2/2019 4:19 PM.  This transcription uses voice recognition software, which may contain typographical errors.

## 2021-05-31 NOTE — TELEPHONE ENCOUNTER
----- Message from Myranda Loya MD sent at 8/5/2019  2:50 PM CDT -----  Please tell the patient:  Your blood test for the kidneys confirms that you have chronic kidney disease, stage 3. Your kidneys are only working about half as well as they should.  There is too much protein in your urine.    You don't have diabetes.  Normal thyroid.  Normal blood counts.  Good cholesterol level.  You do not have tuberculosis (TB), or hepatitis B.  No sign of autoimmune problems, where your body attacks itself.    I would recommend following up with a kidney doctor after you have your ultrasound done to discuss what we can do to improve your kidney health.

## 2021-05-31 NOTE — PATIENT INSTRUCTIONS - HE
Chronic kidney disease - Stage 3  1. Switch blood pressure medication to benazepril 40 mg every day. Stop the amlodipine+benazepril that you're taking now.  2. Continue atorvastatin for cholesterol.  3. Follow up with LAB + NURSE in one month.  4. See kidney doctor, called a nephrologist, 11/12/19.    Colon Cancer Screening  Cologuard kit will come to your house.

## 2021-05-31 NOTE — TELEPHONE ENCOUNTER
----- Message from Myranda Loya MD sent at 8/19/2019 11:40 AM CDT -----  Please tell the patient that her kidney ultrasound is abnormal. It shows:  -signs of kidney damage  -a small cyst on the left kidney.    I will discuss this with her more when she comes in this week.  I'm also putting in a referral for her to see a kidney doctor and they will call to schedule.    Future Appointments  8/23/2019  10:40 AM   Myranda Loya MD        [facility]

## 2021-05-31 NOTE — PROGRESS NOTES
Office Visit  [facility]  Date of Service: 08/23/2019      Subjective   Missy Vance is a 54 y.o. female who presents for Follow-up    Missy is here today to discuss results from recent lab testing.    She states that since she found out that she has kidney problems she has been having burning on the skin over her kidneys and she has been worried about her kidneys.  She has been talking to family members about it.    Recent results show:    Chronic kidney disease, stage III.  Creatinine 1.77, GFR 30.  Random protein to creatinine ratio was 2.38.  Renal ultrasound shows normal-sized kidneys with no hydronephrosis.  There is a lateral increased echotexture and mild cortical thinning.  There is a small mildly complex 1.3 cm cyst at the left lower pole.    Significantly improved LDL.  Previous LDL was 211 on 4/12/2019.  On 8/2/2019 it was 91.  Hemoglobin A1c is 5.5%.  TSH is 1.90.  CBC is normal.  Sed rate 34, TADEO normal . hepatitis B surface antigen negative.  QuantiFERON gold negative.    I discussed my concerns with her today about chronic kidney disease, stage III.  My recommendation was for her to see a nephrologist and have a kidney biopsy.  Her brother is on dialysis.  She reports that her niece and nephew also had some urine test that showed kidney problems.  They took an herb and another headache kidneys are back to normal.  Her preference is to use this herbs for 3 months, then reevaluate kidney function.  If kidneys are still abnormal at that point, she would proceed with further evaluation.  She already has a nephrology appointment scheduled for 11/12/2019 at the [facility].  I discussed the downsides to waiting including delayed diagnosis and treatment, with potential worsening of kidney function.    She is currently on amlodipine 5 mg and benazepril 10 mg (combined pill).  Discussed that I would like to increase her ACE inhibitor.  She is okay with doing  "that.    The 10-year ASCVD risk score (Mohamudtariq RAVI Jr., et al., 2013) is: 2.7%    Values used to calculate the score:      Age: 54 years      Sex: Female      Is Non- : No      Diabetic: No      Tobacco smoker: No      Systolic Blood Pressure: 132 mmHg      Is BP treated: Yes      HDL Cholesterol: 68 mg/dL      Total Cholesterol: 297 mg/dL    We discussed colon cancer screening today.  After discussion of colonoscopy, Cologuard, and fecal occult blood testing, she wishes to proceed with the Cologuard test.  Referral was made.    Objective   /66 (Patient Site: Left Arm, Patient Position: Sitting, Cuff Size: Adult Regular)   Pulse 83   Temp 96.8  F (36  C) (Oral)   Ht 4' 11\" (1.499 m)   Wt 144 lb (65.3 kg)   LMP 03/01/2017   SpO2 99%   BMI 29.08 kg/m     She reports that she has never smoked. She has never used smokeless tobacco.   BP Readings from Last 6 Encounters:   08/23/19 132/66   08/02/19 128/58   04/12/19 110/68   09/11/18 110/66   06/22/18 118/62   05/11/18 122/74     Gen: Alert, no apparent distress.  Heart: Regular rate and rhythm, no murmurs.  Lungs: Clear to auscultation bilaterally, no increased work of breathing.  Abdomen: Soft, non-tender, non-distended, bowel sounds normal.  Extremities: No clubbing, cyanosis, edema.    Results for orders placed or performed in visit on 08/02/19   Urinalysis   Result Value Ref Range    Color, UA Yellow Colorless, Yellow, Straw, Light Yellow    Clarity, UA Clear Clear    Glucose, UA Negative Negative    Bilirubin, UA Negative Negative    Ketones, UA Negative Negative    Specific Gravity, UA 1.015 1.005 - 1.030    Blood, UA Moderate (!) Negative    pH, UA 6.0 5.0 - 8.0    Protein,  mg/dL (!) Negative mg/dL    Urobilinogen, UA 0.2 E.U./dL 0.2 E.U./dL, 1.0 E.U./dL    Nitrite, UA Negative Negative    Leukocytes, UA Trace (!) Negative    Bacteria, UA None Seen None Seen hpf    RBC, UA 3-5 (!) None Seen, 0-2 hpf    WBC, UA 0-5 None " Seen, 0-5 hpf    Squam Epithel, UA 0-5 None Seen, 0-5 lpf   Protein/Creatinine Ratio, Urine   Result Value Ref Range     Protein, Random Urine 91 mg/dL    Creatinine, Urine 38.3 mg/dL    Protein/Creatinine Ratio, Random Urine 2.38    Glycosylated Hemoglobin A1c   Result Value Ref Range    Hemoglobin A1c 5.5 3.5 - 6.0 %   HM2(CBC w/o Differential)   Result Value Ref Range    WBC 8.2 4.0 - 11.0 thou/uL    RBC 4.21 3.80 - 5.40 mill/uL    Hemoglobin 12.1 12.0 - 16.0 g/dL    Hematocrit 35.6 35.0 - 47.0 %    MCV 85 80 - 100 fL    MCH 28.7 27.0 - 34.0 pg    MCHC 33.9 32.0 - 36.0 g/dL    RDW 12.3 11.0 - 14.5 %    Platelets 248 140 - 440 thou/uL    MPV 7.9 7.0 - 10.0 fL   Thyroid Cascade   Result Value Ref Range    TSH 1.90 0.30 - 5.00 uIU/mL   QTF-Mycobacterium tuberculosis by QuantiFERON-TB Gold Plus   Result Value Ref Range    QTF RESULT Negative Negative    QTF INTREPRETATION       No interferon-gamma response to M. tuberculosis antigens was detected.  Infecton with M. tuberculosis is unlikely.  A negative result alone does not exclude infection with M. tuberculosis    QTF NIL 0.08 IU/mL    QTF ANTIGEN TB1-NIL -0.01 IU/mL    QTF ANTIGEN TB2 - NIL -0.02 IU/mL    QTF MITOGEN-NIL >10.00 IU/mL   Hepatitis B Surface Antigen (HBsAG)   Result Value Ref Range    Hepatitis B Surface Ag Negative Negative   LDL Cholesterol, Direct   Result Value Ref Range    Direct LDL 91 <=129 mg/dl   Comprehensive Metabolic Panel   Result Value Ref Range    Sodium 140 136 - 145 mmol/L    Potassium 4.6 3.5 - 5.0 mmol/L    Chloride 107 98 - 107 mmol/L    CO2 21 (L) 22 - 31 mmol/L    Anion Gap, Calculation 12 5 - 18 mmol/L    Glucose 99 70 - 125 mg/dL    BUN 35 (H) 8 - 22 mg/dL    Creatinine 1.77 (H) 0.60 - 1.10 mg/dL    GFR MDRD Af Amer 37 (L) >60 mL/min/1.73m2    GFR MDRD Non Af Amer 30 (L) >60 mL/min/1.73m2    Bilirubin, Total 0.2 0.0 - 1.0 mg/dL    Calcium 9.4 8.5 - 10.5 mg/dL    Protein, Total 7.6 6.0 - 8.0 g/dL    Albumin 3.9 3.5 - 5.0 g/dL     Alkaline Phosphatase 125 (H) 45 - 120 U/L    AST 28 0 - 40 U/L    ALT 38 0 - 45 U/L   Erythrocyte Sedimentation Rate   Result Value Ref Range    Sed Rate 34 (H) 0 - 20 mm/hr   Antinuclear Antibodies Screen (TADEO)   Result Value Ref Range    TADEO Screen Cascade 0.4 <=2.9 U     LIPIDS:  Lab Results   Component Value Date    CHOL 297 (H) 04/12/2019    CHOL 292 (H) 06/22/2018     Lab Results   Component Value Date    HDL 68 04/12/2019    HDL 57 06/22/2018     Lab Results   Component Value Date    LDLCALC 211 (H) 04/12/2019    LDLCALC 205 (H) 06/22/2018     Lab Results   Component Value Date    TRIG 89 04/12/2019    TRIG 152 (H) 06/22/2018     DIABETES:  Lab Results   Component Value Date    HGBA1C 5.5 08/02/2019         Assessment & Plan     1. Chronic kidney disease, stage III.  Likely due to history of hypertension and hyperlipidemia.  Renal ultrasound shows some abnormalities consistent with medical disease.  As above, the patient does not want to do any further assessment or treatment until she tries an herbal medication, that worked for her niece and nephew, for 3 months.  She is willing to change blood pressure medications during that time and come in for follow-up of blood pressure and renal function in 1 month.  Lab visit for BMP, protein to creatinine ratio is scheduled for 9/27/2019.  [facility]phrology appointment at [facility] is scheduled for 7/12/2019.  2. Hypertension.  Adequate control.  Discontinue amlodipine/benazepril 5 mg / 10 mg and start benazepril 40 mg daily due to diagnosis of chronic kidney disease.  Recheck blood pressure, BMP, and protein to creatinine ratio in 1 month.  Future orders were placed.  3. Hyperlipidemia.  Continue atorvastatin 40 mg.  LDL is at goal.  4. Aspirin.  The patient is not a candidate for aspirin.  10-year calculated risk is 2.7%.  5. Health maintenance.  After discussion of risks, benefits and alternatives, the patient would like to do Cologuard testing for colon  cancer screening.  A referral was made for that.      Order Summary                                                      1. CKD (chronic kidney disease) stage 3, GFR 30-59 ml/min (H)  benazepril (LOTENSIN) 40 MG tablet    Basic Metabolic Panel    Protein/Creatinine Ratio, Urine   2. Colon cancer screening  Cologuard   3. Renal cyst, left     4. Essential hypertension  benazepril (LOTENSIN) 40 MG tablet      Future Appointments   Date Time Provider Department Center   9/27/2019  9:30 AM [facility] CS [facility]   9/27/2019 10:00 AM [facility]       Completed by: Myranda Loya M.D., [facility]. 8/23/2019 11:19 AM.  This transcription uses voice recognition software, which may contain typographical errors.

## 2021-06-01 VITALS — BODY MASS INDEX: 28.43 KG/M2 | WEIGHT: 141 LBS | HEIGHT: 59 IN

## 2021-06-01 VITALS — HEIGHT: 59 IN | WEIGHT: 142.7 LBS | BODY MASS INDEX: 28.77 KG/M2

## 2021-06-01 NOTE — TELEPHONE ENCOUNTER
"Got the reach of spouse. He is on the CTC form, however not all boxes are checked. Unable to share information to spouse. #1    \" Okay to relay message\"    "

## 2021-06-01 NOTE — TELEPHONE ENCOUNTER
Please tell the patient that her Cologuard test could not be processed because it was not collected according to the instructions.  They ([facility]) will contact her to send her a new collection kit.

## 2021-06-02 VITALS — WEIGHT: 139.4 LBS | BODY MASS INDEX: 28.16 KG/M2

## 2021-06-02 VITALS — BODY MASS INDEX: 28.01 KG/M2 | WEIGHT: 138.7 LBS

## 2021-06-02 NOTE — TELEPHONE ENCOUNTER
Called and spoke with patient's  with a phone  [phone]. Patient is at work and will return phone call to clinic. IF patient returns phone call please relay message from  and then ask the questions and route message back to pool to get answers back to Dr. Loya. Thank you.  Nilda Messer

## 2021-06-02 NOTE — TELEPHONE ENCOUNTER
----- Message from Myranda Loya MD sent at 10/14/2019 10:40 AM CDT -----  Please tell the patient that her kidney tests have been gradually getting worse.  Please make sure that she is taking her blood pressure medications, as they will help to protect her kidneys from damage.  Has she seen the kidney doctor?

## 2021-06-02 NOTE — TELEPHONE ENCOUNTER
"Attempt to call pt, Voicemail box is full. Unable to leave message.#3  \" Okay to relay message\"    Attempted 3 times okay to send letter?        "

## 2021-06-02 NOTE — TELEPHONE ENCOUNTER
"Attempt to call pt, Voice mail box was full, unable to leave message.#1    \" Okay to relay message\"      "

## 2021-06-03 VITALS — BODY MASS INDEX: 28.81 KG/M2 | WEIGHT: 142.9 LBS | HEIGHT: 59 IN

## 2021-06-03 VITALS — WEIGHT: 144 LBS | BODY MASS INDEX: 29.03 KG/M2 | HEIGHT: 59 IN

## 2021-06-04 VITALS
WEIGHT: 140 LBS | TEMPERATURE: 97.7 F | SYSTOLIC BLOOD PRESSURE: 127 MMHG | HEART RATE: 79 BPM | BODY MASS INDEX: 28.22 KG/M2 | HEIGHT: 59 IN | DIASTOLIC BLOOD PRESSURE: 82 MMHG | RESPIRATION RATE: 18 BRPM

## 2021-06-05 VITALS
HEART RATE: 76 BPM | HEIGHT: 59 IN | SYSTOLIC BLOOD PRESSURE: 132 MMHG | WEIGHT: 143 LBS | TEMPERATURE: 97.6 F | DIASTOLIC BLOOD PRESSURE: 75 MMHG | BODY MASS INDEX: 28.83 KG/M2 | RESPIRATION RATE: 15 BRPM

## 2021-06-05 NOTE — TELEPHONE ENCOUNTER
----- Message from Myranda Loya MD sent at 1/20/2020  4:34 PM CST -----  Please send a letter.  Let her know that the contact information we have for her is not working.  ----- Message -----  From: Cheri Mehta CMA  Sent: 1/20/2020   4:23 PM CST  To: Myranda Loya MD    I attempted to call the patient and the number dialed doesn't seem to be working. The message I got when calling is the the number called has been changed, disconnected or not in service. What would you like us to do?

## 2021-06-05 NOTE — PROGRESS NOTES
"Office Visit  [facility]  Date of Service: 01/17/2020      Subjective   Missy Vance is a 54 y.o. female who presents for Follow-up    Missy is here today to follow-up on her kidneys.    Stage IV chronic kidney disease  We reviewed her most recent lab tests which show progressive decline in GFR.  Now down to 29.  Renal ultrasound showed normal-sized kidneys without hydronephrosis but bilateral increased echotexture and mild cortical thinning.  There are small, minimally complex left renal cysts, for which a follow-up exam is advised in 1 year.    She missed her appointment with the nephrologist.  She needs to reschedule it.  She prefers not to go to [facility] because she does not feel like she can drive on the [place] area.  She is really reluctant to have a kidney biopsy.    Feels like UTI  For three months  When bladder full, pushes into vagina  Gets urge to urinate, but doesn't go  No incontinence  No fecal problems   No period in a long time    Hot flashes  Burning + tightness of back and neck  Sometimes sweats with them.  Taking some vitamin or herb  Work doesn't affect    The 10-year ASCVD risk score (Mohamud BREONNA Jr., et al., 2013) is: 1.5%    Values used to calculate the score:      Age: 54 years      Sex: Female      Is Non- : No      Diabetic: No      Tobacco smoker: No      Systolic Blood Pressure: 127 mmHg      Is BP treated: Yes      HDL Cholesterol: 73 mg/dL      Total Cholesterol: 211 mg/dL      Objective   /82 (Patient Site: Right Arm, Patient Position: Sitting, Cuff Size: Adult Regular)   Pulse 79   Temp 97.7  F (36.5  C) (Oral)   Resp 18   Ht 4' 11\" (1.499 m)   Wt 140 lb (63.5 kg)   LMP 03/01/2017   BMI 28.28 kg/m     She reports that she has never smoked. She has never used smokeless tobacco.    Gen: Alert, no apparent distress.  Heart: Regular rate and rhythm, no murmurs.  Lungs: Clear to " auscultation bilaterally, no increased work of breathing.  Abdomen: Soft, non-tender, non-distended, bowel sounds normal.  Genitourinary: Vulva, vagina, and cervix are normal in appearance.  No cervical motion tenderness or adnexal tenderness.  No adnexal fullness.  Grade 1 uterine prolapse.  Cystocele present.  Extremities: No clubbing, cyanosis, edema.    Results for orders placed or performed in visit on 01/17/20   Culture, Urine   Result Value Ref Range    Culture No Growth    Wet Prep, Vaginal   Result Value Ref Range    Yeast Result No yeast seen No yeast seen    Trichomonas No Trichomonas seen No Trichomonas seen    Clue Cells, Wet Prep No Clue cells seen No Clue cells seen   Chlamydia trachomatis & Neisseria gonorrhoeae, Amplified Detection   Result Value Ref Range    Chlamydia trachomatis, Amplified Detection Negative Negative    Neisseria gonorrhoeae, Amplified Detection Negative Negative   HIV Antigen/Antibody Screening Cascade   Result Value Ref Range    HIV Antigen / Antibody Negative Negative   Urinalysis   Result Value Ref Range    Color, UA Yellow Colorless, Yellow, Straw, Light Yellow    Clarity, UA Clear Clear    Glucose, UA Negative Negative    Bilirubin, UA Negative Negative    Ketones, UA Negative Negative    Specific Gravity, UA 1.015 1.005 - 1.030    Blood, UA Large (!) Negative    pH, UA 5.5 5.0 - 8.0    Protein, UA >=300 mg/dL (!) Negative mg/dL    Urobilinogen, UA 0.2 E.U./dL 0.2 E.U./dL, 1.0 E.U./dL    Nitrite, UA Negative Negative    Leukocytes, UA Small (!) Negative    Bacteria, UA Few (!) None Seen hpf    RBC, UA 5-10 (!) None Seen, 0-2 hpf    WBC, UA 5-10 (!) None Seen, 0-5 hpf    Squam Epithel, UA 5-10 (!) None Seen, 0-5 lpf    Mucus, UA Few (!) None Seen lpf   Protein/Creatinine Ratio, Urine   Result Value Ref Range     Protein, Random Urine 127 mg/dL    Creatinine, Urine 44.0 mg/dL    Protein/Creatinine Ratio, Random Urine 2.89    Glycosylated Hemoglobin A1c   Result Value Ref Range     Hemoglobin A1c 5.8 3.5 - 6.0 %   Comprehensive Metabolic Panel   Result Value Ref Range    Sodium 142 136 - 145 mmol/L    Potassium 4.3 3.5 - 5.0 mmol/L    Chloride 107 98 - 107 mmol/L    CO2 24 22 - 31 mmol/L    Anion Gap, Calculation 11 5 - 18 mmol/L    Glucose 86 70 - 125 mg/dL    BUN 37 (H) 8 - 22 mg/dL    Creatinine 1.81 (H) 0.60 - 1.10 mg/dL    GFR MDRD Af Amer 36 (L) >60 mL/min/1.73m2    GFR MDRD Non Af Amer 29 (L) >60 mL/min/1.73m2    Bilirubin, Total 0.2 0.0 - 1.0 mg/dL    Calcium 9.3 8.5 - 10.5 mg/dL    Protein, Total 7.7 6.0 - 8.0 g/dL    Albumin 3.8 3.5 - 5.0 g/dL    Alkaline Phosphatase 112 45 - 120 U/L    AST 19 0 - 40 U/L    ALT 22 0 - 45 U/L   Lipid Cascade   Result Value Ref Range    Cholesterol 211 (H) <=199 mg/dL    Triglycerides 65 <=149 mg/dL    HDL Cholesterol 73 >=50 mg/dL    LDL Calculated 125 <=129 mg/dL    Patient Fasting > 8hrs? No      No results found.    Assessment & Plan     1. Kidney disease stage IV.  Referral made to nephrology.  Labs obtained as below.  Immunized with pneumococcal immunizations.  Risk factors include hypertension.  The patient does not have diabetes or hyperlipidemia.  No significant family history.   2. Health maintenance: Pap smear obtained.  Due to vaginal discharge, wet prep, gonorrhea, and chlamydia obtained.  The patient declines influenza immunization.  It was advised.      Order Summary                                                      1. CKD (chronic kidney disease) stage 3, GFR 30-59 ml/min (H)  Pneumococcal polysaccharide vaccine 23-valent 3 yo or older, subq/IM    Influenza, Recombinant, Inj, Quadrivalent, PF, 18+YRS    Urinalysis    Culture, Urine    Protein/Creatinine Ratio, Urine    Glycosylated Hemoglobin A1c    Comprehensive Metabolic Panel    Lipid [place]    Ambulatory referral to Nephrology   2. Visit for screening mammogram  Mammo Screening Bilateral   3. Essential hypertension     4. Other hyperlipidemia     5. Health care maintenance   HIV Antigen/Antibody Screening Cris[place]    Varicella Zoster, Recombinant Vaccine IM    CANCELED: HPV Cascade (PCR)   6. Cervical cancer screening  Gynecologic Cytology (PAP Smear)    HPV High Risk DNA Cervical   7. Vaginal discharge  Wet Prep, Vaginal    Chlamydia trachomatis & Neisseria gonorrhoeae, Amplified Detection      No future appointments.    Completed by: Myranda Loya M.D., [facility]. 1/20/2020 1:35 PM.  This transcription uses voice recognition software, which may contain typographical errors.

## 2021-06-08 NOTE — TELEPHONE ENCOUNTER
Due for colon cancer screening. Does she want colonoscopy or cologuard?    No future appointments.  Health Maintenance Due   Topic Date Due     COLORECTAL CANCER SCREENING  06/02/1985     PREVENTIVE CARE VISIT  06/22/2019     ZOSTER VACCINES (2 of 2) 03/13/2020     BP Readings from Last 3 Encounters:   01/17/20 127/82   08/23/19 132/66   08/02/19 128/58

## 2021-06-14 ENCOUNTER — OFFICE VISIT - HEALTHEAST (OUTPATIENT)
Dept: FAMILY MEDICINE | Facility: CLINIC | Age: 56
End: 2021-06-14

## 2021-06-14 DIAGNOSIS — Z12.31 VISIT FOR SCREENING MAMMOGRAM: ICD-10-CM

## 2021-06-14 DIAGNOSIS — R21 RASH AND NONSPECIFIC SKIN ERUPTION: ICD-10-CM

## 2021-06-14 DIAGNOSIS — I10 ESSENTIAL HYPERTENSION: ICD-10-CM

## 2021-06-14 RX ORDER — AMLODIPINE AND BENAZEPRIL HYDROCHLORIDE 5; 10 MG/1; MG/1
CAPSULE ORAL
Qty: 90 CAPSULE | Refills: 2 | Status: SHIPPED | OUTPATIENT
Start: 2021-06-14 | End: 2022-03-10

## 2021-06-14 RX ORDER — GABAPENTIN 100 MG/1
CAPSULE ORAL
Status: SHIPPED | COMMUNITY
Start: 2021-06-04 | End: 2021-08-06

## 2021-06-14 RX ORDER — TRIAMCINOLONE ACETONIDE 1 MG/G
OINTMENT TOPICAL
Qty: 45 G | Refills: 0 | Status: SHIPPED | OUTPATIENT
Start: 2021-06-14 | End: 2022-04-07

## 2021-06-14 NOTE — TELEPHONE ENCOUNTER
Reason for Disposition    Information only question and nurse able to answer    Protocols used: INFORMATION ONLY CALL - NO TRIAGE-A-OH

## 2021-06-14 NOTE — PROGRESS NOTES
Missy Vance is a 55 y.o. female who is being evaluated via a billable telephone visit.      What phone number would you like to be contacted at? [phone]   How would you like to obtain your AVS? AVS Preference: Mail a copy.  Assessment & Plan     Missy was seen today for expose covid.    Diagnoses and all orders for this visit:    Suspected COVID-19 virus infection  -     Symptomatic COVID-19 Virus (CORONAVIRUS) PCR; Future    Prediabetes    Other hyperlipidemia    Essential hypertension    Exposure to COVID-19 virus  -     Symptomatic COVID-19 Virus (CORONAVIRUS) PCR; Future    Stage 3b chronic kidney disease      Patient has exposure to COVID-19 and now has symptoms consistent with COVID-19 we will check COVID-19    Letter for work she will be off work from 1/10/2021 and continue to be off of work.  Return to work pending results of the COVID-19 and her symptoms.    Hypertension controlled    Hyperlipidemia has been stable.    Chronic kidney disease needs to follow every 3 to 6 months with nephrology.    Avoid NSAIDs    Stay hydrated    Push fluids get rest use Tylenol, continue to self quarantine    She will be contacted with results COVID-19.  May need to follow-up after that if positive to figure out when she is able to return to work.      Diagnosis or treatment significantly limited by social determinants of health -non-English-speaking immigrant    30 minutes spent on the date of the encounter doing chart review, history and exam, documentation and further activities as noted above, including letter transcription    [id number]       No follow-ups on file.    Jason Luna MD  [facility]    Subjective     Missy Vance is 55 y.o. and presents to clinic today for the following health issues   HPI   This patient had a virtual visit, telephone, due to the coronavirus pandemic.    Patient is a 55-year-old female.  She has a son who tested positive on 1/12/2021.  She has been off  work since 1/10/2021 because of exposure to her son.    She tested negative on 1/14/2021 she was self quarantining and then developed symptoms on 1/23/2021 she has had fever diarrhea sore throat decreased taste and cough.    Cough is nonproductive diarrhea is lessening    She does have some muscle aches and feels weaker no shortness of breath no wheezing.    He does have underlying chronic kidney disease her GFR is 29 at the last visit.  She does use some Tylenol for fever I told her that that is fine to take for any fever or pains but avoid NSAIDs.    Also continues on amlodipine/benazepril for blood pressure and atorvastatin for lipids.    She sees the nephrologist every 3 months I did review the notes from 11/20/2020 when she saw COSME Sanchez, from nephrology.    We discussed that the patient likely does have Covid she needs to remain off of work I transcribed a letter for her.    She lives with her son still has ongoing exposure.    We will check COVID-19.    Review of Systems  10 point review of systems positive as outlined above otherwise negative      Objective       Vitals:  No vitals were obtained today due to virtual visit.    Physical Exam  General: No acute distress.    Mainly feels tired    HEENT no significant nasal drainage she does not have any earache she does have a mild sore throat    Neck nontender    Lungs: Nonlabored breathing no wheezing.    Heart: No palpitations or rapid heart rate    Skin normal denies any rashes    Denies any peripheral edema or calf redness or warmth or swelling.        Phone call duration: 21 minutes

## 2021-06-14 NOTE — TELEPHONE ENCOUNTER
Used [facility]  # [id number] .  [facility] triage call :   Presenting problem : Pt called .    Exposured to covid by her  live in 17year old child that tested Positive on 1/12/21 , tested neg on 1/14/21 but having  Covid Sx since 1/23/21  Cough and lose of taste on 1/23/21/ and 1/24/21 Had fever up to 100.5 , diarrhea &  sore throat .     Currently : 1&0 = drinking a lot more   , voiding 4-5 x's at night because pushing fluids , 1&0  eating are ok.  dry throat - so drinking constantly , dizziness and weak ( activity getting up to Bathroom , kitchen and bed ) mild brief abdominal cramping before diarrhea  , liquid  diarrhea - x 2 in last 8 hours , dizziness when up initially for 10 minutes ( get up very slowly ) but getting up independently to bathroom , Moderate  sore throat with swallowing &  no fever for last 24 hours .     Guideline used : Covid suspected  A AH.   Disposition and recommendations : COVID 19 Nurse Triage Plan/Patient Instructions    Please be aware that novel coronavirus (COVID-19) may be circulating in the community. If you develop symptoms such as fever, cough, or SHORTNESS OF BREATH or if you have concerns about the presence of another infection including coronavirus (COVID-19), please contact your health care provider or visit [email or web address].     Disposition/Instructions/ Pt instructed to self isolate and sent to  for virtual visit [facility]   For virtual appt .     Home care recommended. Follow home care protocol based instructions. and Virtual Visit with provider recommended. Reference Visit Selection Guide.    Thank you for taking steps to prevent the spread of this virus.  o Limit your contact with others.  o Wear a simple mask to cover your cough.  o Wash your hands well and often.    Resources    [facility]: About COVID-19: [email or web address]    CDC: What to Do If You're Sick: www.cdc.gov/coronavirus/2019-ncov/about/steps-when-sick.html    CDC: Ending  Home Isolation: www.cdc.gov/coronavirus/2019-ncov/hcp/disposition-in-home-patients.html     CDC: Caring for Someone: www.cdc.gov/coronavirus/2019-ncov/if-you-are-sick/care-for-someone.html     [facility]: Interim Guidance for Hospital Discharge to Home: www.health.[facility].mn.us/diseases/coronavirus/hcp/hospdischarge.pdf    [facility] clinical trials (COVID-19 research studies): clinicalaffairs.[facility]-clinical-trials     Below are the COVID-19 hotlines at the [place] Department of Health ([facility]). Interpreters are available.   o For health questions: Call 689-619-4698 or 1-164.514.4218 (7 a.m. to 7 p.m.)  o For questions about schools and childcare: Call 537-205-4202 or 1-171.927.2615 (7 a.m. to 7 p.m.)     Caller verbalizes understanding and denies further questions and will call back if further symptoms to triage or questions  . Batsheva Pabon RN  - [place] Nurse Advisor     Reason for Disposition    [1] COVID-19 infection suspected by caller or triager AND [2] mild symptoms (cough, fever, or others) AND [3] no complications or SOB    Additional Information    Negative: SEVERE difficulty breathing (e.g., struggling for each breath, speaks in single words)    Negative: Difficult to awaken or acting confused (e.g., disoriented, slurred speech)    Negative: Bluish (or gray) lips or face now    Negative: Shock suspected (e.g., cold/pale/clammy skin, too weak to stand, low BP, rapid pulse)    Negative: Sounds like a life-threatening emergency to the triager    Negative: [1] COVID-19 exposure AND [2] no symptoms    Negative: [1] Lives with someone known to have influenza (flu test positive) AND [2] flu-like symptoms (e.g., cough, runny nose, sore throat, SOB; with or without fever)    Negative: [1] Adult with possible COVID-19 symptoms AND [2] triager concerned about severity of symptoms or other causes    Negative: COVID-19 vaccine reaction suspected (e.g., fever, headache, muscle aches) occurring during days  1-3 after getting vaccine    Negative: COVID-19 vaccine, questions about    Negative: COVID-19 and breastfeeding, questions about    Negative: SEVERE or constant chest pain or pressure (Exception: mild central chest pain, present only when coughing)    Negative: MODERATE difficulty breathing (e.g., speaks in phrases, SOB even at rest, pulse 100-120)    Negative: [1] Headache AND [2] stiff neck (can't touch chin to chest)    Negative: MILD difficulty breathing (e.g., minimal/no SOB at rest, SOB with walking, pulse <100)    Negative: Chest pain or pressure    Negative: Patient sounds very sick or weak to the triager    Negative: Fever > 103 F (39.4 C)    Negative: [1] Fever > 101 F (38.3 C) AND [2] age > 60    Negative: [1] Fever > 100.0 F (37.8 C) AND [2] bedridden (e.g., nursing home patient, CVA, chronic illness, recovering from surgery)    Negative: [1] HIGH RISK patient (e.g., age > 64 years, diabetes, heart or lung disease, weak immune system) AND [2] new or worsening symptoms    Negative: [1] HIGH RISK patient AND [2] influenza is widespread in the community AND [3] ONE OR MORE respiratory symptoms: cough, sore throat, runny or stuffy nose    Negative: [1] HIGH RISK patient AND [2] influenza exposure within the last 7 days AND [3] ONE OR MORE respiratory symptoms: cough, sore throat, runny or stuffy nose    Negative: Fever present > 3 days (72 hours)    Negative: [1] Fever returns after gone for over 24 hours AND [2] symptoms worse or not improved    Negative: [1] Continuous (nonstop) coughing interferes with work or school AND [2] no improvement using cough treatment per protocol    Protocols used: CORONAVIRUS (COVID-19) DIAGNOSED OR [id number] 1.3.21

## 2021-06-14 NOTE — TELEPHONE ENCOUNTER
----- Message from Jason Luna MD sent at 1/27/2021  5:27 PM CST -----  Please contact this patient, let her know that the COVID-19 test came back negative

## 2021-06-14 NOTE — TELEPHONE ENCOUNTER
Coronavirus (COVID-19) Notification    Lab Result   Lab test 2019-nCoV rRt-PCR OR SARS-COV-2 PCR    Nasopharyngeal AND/OR Oropharyngeal swab is NEGATIVE for 2019-nCoV RNA [OR] SARS-COV-2 RNA (COVID-19) RNA    Your result was negative. This means that we didn't find the virus that causes COVID-19 in your sample. A test may show negative when you do actually have the virus. This can happen when the virus is in the early stages of infection, before you feel illness symptoms.    If you have symptoms   Stay home and away from others (self-isolate) until you meet ALL of the guidelines below:    You've had no fever--and no medicine that reduces fever--for 1 full day (24 hours). And      Your other symptoms have gotten better. For example, your cough or breathing has improved. And     At least 10 days have passed since your symptoms started. (If you ve been told by a doctor that you have a weak immune system, wait 20 days.)     During this time:    Stay home. Don't go to work, school or anywhere else.     Stay in your own room, including for meals. Use your own bathroom if you can.    Stay away from others in your home. No hugging, kissing or shaking hands. No visitors.    Clean  high touch  surfaces often (doorknobs, counters, handles, etc.). Use a household cleaning spray or wipes. You can find a full list on the EPA website at www.epa.gov/pesticide-registration/list-n-disinfectants-use-against-sars-cov-2.    Cover your mouth and nose with a mask, tissue or other face covering to avoid spreading germs.    Wash your hands and face often with soap and water.    Going back to work  Check with your employer for any guidelines to follow for going back to work.  You are sent a letter for your Employer which will serve as formal document notice that you, the employee, tested negative for COVID-19, as of the testing date shown above.    If your symptoms worsen or other concerning symptoms, contact PCP, oncare or consider  returning to Emergency Dept.    Where can I get more information?    [facility]: [email or web address]    Coronavirus Basics: www.health.[facility].mn.us/diseases/coronavirus/basics.html    [facility] Hotline (364-700-7408)    Ana Lilia Villafuerte RN     Son tested positive for COVID on 1/10/21. Last known close contact with son on 1/10.  Her symptoms started on 1/23. Still has cough, diarrhea but getting better.  Advised to quarantine until 2/2. Notify employer/supervisor. She verbalized agreement.    Ana Lilia Villafuerte RN/[place] Nurse Advisor

## 2021-06-15 NOTE — TELEPHONE ENCOUNTER
I don't see any results comments    ----- Message from Myranda Loya MD sent at 3/4/2021  4:16 PM CST -----  Please call Missy with lab results (see result comments).

## 2021-06-15 NOTE — PROGRESS NOTES
"Office Visit  [facility]  Date of Service: 02/26/2021      Subjective   Missy Vance is a 55 y.o. female who presents for Neck Pain    : [place]    Neck Pain  \"Like something is blocking my throat\"  Afraid it is her thyroid  Onset: December  \"like something is choking my neck\" \"Tight\"  NO problems with: Swallowing, breathing, voice changes, heartburn, lumps  Tried massage oil/massage - helps a little    Kidneys  Due for follow up of kidney cyst - left  Has follow up 3/26/21 with nephrology  8/16/19: 1.  Normal size kidneys, no hydronephrosis. There does appear to be increased echotexture and mild cortical thinning.Left kidney measures 9.4 x 5.5 x 5 cm.  There is a small mildly complex 1.3 cm cyst at lower pole.      Health Maintenance  Wants to do pap smear next year  Will re-do Cologuard - prior sample unsatisfactory        No data recorded   Objective   /75 (Patient Site: Left Arm, Patient Position: Sitting, Cuff Size: Adult Regular)   Pulse 76   Temp 97.6  F (36.4  C) (Temporal)   Resp 15   Ht 4' 11\" (1.499 m)   Wt 143 lb (64.9 kg)   LMP 03/01/2017   BMI 28.88 kg/m     She reports that she has never smoked. She has never used smokeless tobacco.    Gen: Alert, no apparent distress.  Ears: canals clear, tympanic membranes clear without effusion.   Eyes: no conjunctivitis.   Sinuses: non-tender.  Nose: normal mucosa.   Mouth: adequate dentition.   Tonsils/oropharynx: no tonsillar hypertrophy, mild posterior pharyngeal cobblestoning.  Neck: no significant lymphadenopathy, thyroid not enlarged, not tender.    Heart: Regular rate and rhythm, no murmurs.  Lungs: Clear to auscultation bilaterally, no increased work of breathing.  Abdomen: Soft, non-tender, non-distended, bowel sounds normal.  Extremities: No clubbing, cyanosis, edema.    Results for orders placed or performed in visit on 02/26/21   Glycosylated Hemoglobin A1c   Result Value Ref Range    " Hemoglobin A1c 5.5 <=5.6 %   HM2(CBC w/o Differential)   Result Value Ref Range    WBC 8.0 4.0 - 11.0 thou/uL    RBC 4.13 3.80 - 5.40 mill/uL    Hemoglobin 11.6 (L) 12.0 - 16.0 g/dL    Hematocrit 35.5 35.0 - 47.0 %    MCV 86 80 - 100 fL    MCH 28.1 27.0 - 34.0 pg    MCHC 32.7 32.0 - 36.0 g/dL    RDW 13.3 11.0 - 14.5 %    Platelets 248 140 - 440 thou/uL    MPV 9.8 7.0 - 10.0 fL   Urinalysis Macro & Micro   Result Value Ref Range    Color, UA Yellow Colorless, Yellow, Straw, Light Yellow    Clarity, UA Clear Clear    Glucose, UA Negative Negative    Bilirubin, UA Negative Negative    Ketones, UA Negative Negative    Specific Gravity, UA 1.015 1.005 - 1.030    Blood, UA Moderate (!) Negative    pH, UA 5.0 5.0 - 8.0    Protein, UA >=300 mg/dL (!) Negative mg/dL    Urobilinogen, UA 0.2 E.U./dL 0.2 E.U./dL, 1.0 E.U./dL    Nitrite, UA Negative Negative    Leukocytes, UA Small (!) Negative    Bacteria, UA Few (!) None Seen hpf    RBC, UA 0-2 None Seen, 0-2 hpf    WBC, UA 5-10 (!) None Seen, 0-5 hpf    Squam Epithel, UA 10-25 (!) None Seen, 0-5 lpf     No results found.    Assessment & Plan     1. Neck tightness. Worried about thyroid. Check thyroid serologies. Normal exam.  2. CKD 3b. Likely IgA nephropathy. Followed by [facility]. Check labs.  3. Health maintenance - declines mammogram until next year, will re-do cologuard.  4. Anemia, normocytic - likely chronic disease due to CKD. Check iron studies.   5. HTN well controlled.       Order Summary                                                      1. Neck tightness  Thyroid [place]   2. Visit for screening mammogram  CANCELED: Mammo Screening Bilateral   3. Colon cancer screening  Cologuard    CANCELED: Ambulatory referral for Colonoscopy   4. Screening for viral disease  Hepatitis C Antibody (Anti-HCV)   5. Stage 3b chronic kidney disease  Comprehensive Metabolic Panel    HM2(CBC w/o Differential)   6. Essential hypertension     7. Other hyperlipidemia  Lipid  Cascade   8. Prediabetes  Glycosylated Hemoglobin A1c   9. Overweight (BMI 25.0-29.9)     10. Renal cyst, left  US Kidney Left    Microalbumin, Random Urine    Urinalysis Macro & Micro   11. Other proteinuria  Microalbumin, Random Urine    Urinalysis Macro & Micro   12. Anemia, unspecified type  Iron and Transferrin Iron Binding Capacity      No future appointments.    Completed by: Myranda Loya M.D., [facility]. 2/26/2021 8:46 AM.  This transcription uses voice recognition software, which may contain typographical errors.

## 2021-06-16 PROBLEM — N28.1 RENAL CYST, LEFT: Status: ACTIVE | Noted: 2019-08-23

## 2021-06-16 PROBLEM — E66.3 OVERWEIGHT: Status: ACTIVE | Noted: 2019-08-02

## 2021-06-16 PROBLEM — N18.32 STAGE 3B CHRONIC KIDNEY DISEASE (H): Status: ACTIVE | Noted: 2019-08-02

## 2021-06-16 PROBLEM — R73.03 PREDIABETES: Status: ACTIVE | Noted: 2020-01-17

## 2021-06-17 NOTE — PATIENT INSTRUCTIONS - HE
Patient Instructions by Myranda Loya MD at 1/17/2020  1:20 PM     Author: Myranda Loya MD Service: -- Author Type: Physician    Filed: 1/17/2020  1:50 PM Encounter Date: 1/17/2020 Status: Addendum    : Myranda Loya MD (Physician)    Related Notes: Original Note by Myranda Loya MD (Physician) filed at 1/17/2020  1:45 PM       To Keep Kidneys Healthy  Take good care of your blood pressure (goal less than 130/80).  See a kidney doctor.    To Know Why Your Kidneys are Failing  Consider a kidney biopsy.

## 2021-06-18 NOTE — PROGRESS NOTES
Assessment/Plan:     1. Encounter to establish care    2. Thoracic back pain  Back exam is fairly unremarkable.  No radiculopathy or concerns for stenosis.  I do think back pain is muscular in nature.  I do not recommend any imaging at this time.  We did discuss the use of physical therapy, but patient declines at this time.  Printed and discussed back stretches to be utilized at home.  She may try some ice/heat when symptoms worsen.  Discussed proper posture and taking breaks at work from sitting for long periods.  She will continue Tylenol as needed for pain.  She would likely benefit from an anti-inflammatory, but gets nauseated with these.  Will await previous records.  I do recommend patient following up for a full physical.        Subjective:     Missy Vance is a 52 y.o. female who presents to establish care and with complaints of back pain.  Patient previously seen by Dr. Cheri Morrow at [facility].  An MAGALI has been sent for patient's records.  Patient is not the best historian, but does tell me she has a history of hypertension.  She is currently on amlodipine-benazepril once daily.  Patient states she is on 2 other daily medications, possibly omeprazole and aspirin, but she is not completely sure.  She denies any other chronic medical history.    Patient complains of back pain that comes and goes.  The pain is not always in the same spot.  She sometimes has upper back pain and lower back pain.  She currently has some pain to the center of her back.  Patient describes pain as a generalized ache.  She was seen in the ER this past December for cough and chest pain.  She tells me she also had back pain at that visit, and was given a medication that was very helpful.  A review of this chart reveals that she was given IV Toradol.  Patient states her pain came back about 6 weeks ago.  She will have generalized pain in the back if she lays for too long.  Denies any radiation of pain into her  "arms, neck, or lower extremities.  Denies any fever, weight loss, episodes of incontinence, saddle anesthesia, or upper/lower extremity weakness.  She does not have any wrist pain or numbness/tingling in the hands or feet.  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  She is mostly sitting at work.  Patient has tried Tylenol for her back pain, which is helpful.  She is unable to take ibuprofen, as it causes nausea.      The following portions of the patient's history were reviewed and updated as appropriate: allergies, current medications, past family history, past medical history, past social history, past surgical history and problem list.    Review of Systems  Pertinent items are noted in HPI.     Objective:     /74 (Patient Site: Right Arm, Patient Position: Sitting, Cuff Size: Adult Regular)  Pulse 88  Ht 4' 11\" (1.499 m)  Wt 142 lb 11.2 oz (64.7 kg)  BMI 28.82 kg/m2    General Appearance: Alert, cooperative, no distress, appears stated age  Back: Spine is straight and nontender.  Tenderness palpated to the left, thoracic paraspinal muscles.  No spasm noted.  No pain to the cervical or lumbar muscles.  Patient appears comfortable and gait is normal.  Upper and lower extremity strength intact bilaterally.   strength is equal bilaterally.  Patellar and biceps reflexes 2/4 bilaterally.  Gross sensation intact.  SLR negative bilaterally.      Valerie Corral NP-C    "
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
## 2021-06-19 NOTE — LETTER
Letter by Myranda Loya MD at      Author: Myranda Loya MD Service: -- Author Type: --    Filed:  Encounter Date: 10/16/2019 Status: Signed         Missy Vance,    We have been trying to reach you regarding your test results.  Your kidney tests have been gradually getting     Worse. Please make sure your taking your blood pressure medications, as they will help to protect your     kidneys from damage. Have you seen the kidney doctor? If you have any question please give us a call.      Sincerely,    Myranda Loya

## 2021-06-19 NOTE — LETTER
Letter by Myranda Loya MD at      Author: Myranda Loya MD Service: -- Author Type: --    Filed:  Encounter Date: 8/5/2019 Status: (Other)       Missy Vance  [address] [place] [address]          8/5/2019    Re: Missy Vance  YOB: 1965      Dear Missy;    Here are your results. They show:    Your blood test for the kidneys confirms that you have chronic kidney disease, stage 3. Your kidneys are only working about half as well as they should. There is too much protein and a little blood in your urine.      You don't have diabetes.    Normal thyroid.    Normal blood counts.    Good cholesterol level.    You do not have tuberculosis (TB) or hepatitis B.    No sign of autoimmune problems, where your body attacks itself.    I would recommend following up with a kidney doctor after you have your ultrasound done to discuss what we can do to improve your kidney health.    Office Visit on 08/02/2019   Component Date Value Ref Range Status   ? Color, UA 08/02/2019 Yellow  Colorless, Yellow, Straw, Light Yellow Final   ? Clarity, UA 08/02/2019 Clear  Clear Final   ? Glucose, UA 08/02/2019 Negative  Negative Final   ? Bilirubin, UA 08/02/2019 Negative  Negative Final   ? Ketones, UA 08/02/2019 Negative  Negative Final   ? Specific Gravity, UA 08/02/2019 1.015  1.005 - 1.030 Final   ? Blood, UA 08/02/2019 Moderate* Negative Final   ? pH, UA 08/02/2019 6.0  5.0 - 8.0 Final   ? Protein, UA 08/02/2019 100 mg/dL* Negative mg/dL Final   ? Urobilinogen, UA 08/02/2019 0.2 E.U./dL  0.2 E.U./dL, 1.0 E.U./dL Final   ? Nitrite, UA 08/02/2019 Negative  Negative Final   ? Leukocytes, UA 08/02/2019 Trace* Negative Final   ? Bacteria, UA 08/02/2019 None Seen  None Seen hpf Final   ? RBC, UA 08/02/2019 3-5* None Seen, 0-2 hpf Final   ? WBC, UA 08/02/2019 0-5  None Seen, 0-5 hpf Final   ? Squam Epithel, UA 08/02/2019 0-5  None Seen, 0-5 lpf Final   ?  Protein, Random Urine 08/02/2019 91  mg/dL Final   ?  Creatinine, Urine 08/02/2019 38.3  mg/dL Final   ? Protein/Creatinine Ratio, Random U* 08/02/2019 2.38   Final   ? Hemoglobin A1c 08/02/2019 5.5  3.5 - 6.0 % Final   ? WBC 08/02/2019 8.2  4.0 - 11.0 thou/uL Final   ? RBC 08/02/2019 4.21  3.80 - 5.40 mill/uL Final   ? Hemoglobin 08/02/2019 12.1  12.0 - 16.0 g/dL Final   ? Hematocrit 08/02/2019 35.6  35.0 - 47.0 % Final   ? MCV 08/02/2019 85  80 - 100 fL Final   ? MCH 08/02/2019 28.7  27.0 - 34.0 pg Final   ? MCHC 08/02/2019 33.9  32.0 - 36.0 g/dL Final   ? RDW 08/02/2019 12.3  11.0 - 14.5 % Final   ? Platelets 08/02/2019 248  140 - 440 thou/uL Final   ? MPV 08/02/2019 7.9  7.0 - 10.0 fL Final   ? TSH 08/02/2019 1.90  0.30 - 5.00 uIU/mL Final   ? QTF RESULT 08/02/2019 Negative  Negative Final   ? QTF NIL 08/02/2019 0.08  IU/mL Final   ? QTF ANTIGEN TB1-NIL 08/02/2019 -0.01  IU/mL Final   ? QTF ANTIGEN TB2 - NIL 08/02/2019 -0.02  IU/mL Final   ? QTF MITOGEN-NIL 08/02/2019 >10.00  IU/mL Final   ? Hepatitis B Surface Ag 08/02/2019 Negative  Negative Final   ? Direct LDL 08/02/2019 91  <=129 mg/dl Final   ? Sodium 08/02/2019 140  136 - 145 mmol/L Final   ? Potassium 08/02/2019 4.6  3.5 - 5.0 mmol/L Final   ? Chloride 08/02/2019 107  98 - 107 mmol/L Final   ? CO2 08/02/2019 21* 22 - 31 mmol/L Final   ? Anion Gap, Calculation 08/02/2019 12  5 - 18 mmol/L Final   ? Glucose 08/02/2019 99  70 - 125 mg/dL Final   ? BUN 08/02/2019 35* 8 - 22 mg/dL Final   ? Creatinine 08/02/2019 1.77* 0.60 - 1.10 mg/dL Final   ? GFR MDRD Af Amer 08/02/2019 37* >60 mL/min/1.73m2 Final   ? GFR MDRD Non Af Amer 08/02/2019 30* >60 mL/min/1.73m2 Final   ? Bilirubin, Total 08/02/2019 0.2  0.0 - 1.0 mg/dL Final   ? Calcium 08/02/2019 9.4  8.5 - 10.5 mg/dL Final   ? Protein, Total 08/02/2019 7.6  6.0 - 8.0 g/dL Final   ? Albumin 08/02/2019 3.9  3.5 - 5.0 g/dL Final   ? Alkaline Phosphatase 08/02/2019 125* 45 - 120 U/L Final   ? AST 08/02/2019 28  0 - 40 U/L Final   ? ALT 08/02/2019 38  0 - 45 U/L Final    ? Sed Rate 08/02/2019 34* 0 - 20 mm/hr Final   ? TADEO Screen Cascade 08/02/2019 0.4  <=2.9 U Final       If you have any questions, please call [phone].    Sincerely,    Myranda Loya MD  Family Physician  [facility]

## 2021-06-20 NOTE — LETTER
Letter by Myranda Loya MD at      Author: Myranda Loya MD Service: -- Author Type: --    Filed:  Encounter Date: 1/17/2020 Status: Signed         January 17, 2020     Patient: Missy Vance   YOB: 1965   Date of Visit: 1/17/2020       To Whom it May Concern:    Missy Vance was seen in my clinic on 1/17/2020.    If you have any questions or concerns, please don't hesitate to call.    Sincerely,         Electronically signed by Myranda Loya MD

## 2021-06-20 NOTE — PROGRESS NOTES
Assessment/Plan:     1. Diarrhea  - C. difficile Toxigenic by PCR; Future  - Culture, Stool; Future  - Ova and Parasite, Stool; Future  - loperamide (IMODIUM A-D) 2 mg tablet; Take 1 tablet (2 mg total) by mouth 4 (four) times a day as needed for diarrhea.  Dispense: 20 tablet; Refill: 0    2. Nausea  - ondansetron (ZOFRAN) 4 MG tablet; Take 1 tablet (4 mg total) by mouth every 8 (eight) hours as needed for nausea.  Dispense: 20 tablet; Refill: 0    Reassured the patient is afebrile and vitals are stable.  Symptoms are likely viral.  Given the amount of stool she is having, will have her complete some stool studies.  She is unable to complete these today in clinic, I did encourage her to collect them today and return them.  We will treat symptomatically at this time.  Recommend Imodium, up to 4 doses per day.  I did prescribe some Zofran as needed for nausea.  Push fluids and simple carbohydrates such as banana, toast, and rice.  Discussed symptoms that would warrant emergent care including fever, severe abdominal pain, or inability to keep any food/fluids down.  If this is viral, I would suspect symptoms improve in the next 1-2 days.  Patient verbalizes understanding.    Subjective:     Missy Vance is a 53 y.o. female accompanied by an  who presents with complaints of diarrhea and nausea.  Symptoms started on Sunday after she ate some chicken.  A friend of hers also ate the same meal, and is not ill.  Symptoms worsened yesterday, and she reports of up to 20 episodes of loose/watery diarrhea.  Symptoms are slightly better today, but she says she is already had 7 episodes.  There is no blood in her stools.  She is not vomiting, but is quite nauseous.  No fevers, flank pain, or dysuria.  She does complain of abdominal discomfort.  Patient has been able to eat and drink, but appetite is quite poor.  She feels that she has diarrhea immediately after taking in any substance.  Patient denies any recent  international travel or antibiotic use.  She did drink some water earlier today.  Patient has tried some over-the-counter Imodium and Ira-Lima, which has not been significantly helpful.      The following portions of the patient's history were reviewed and updated as appropriate: allergies, current medications, past family history, past medical history, past social history, past surgical history and problem list.    Review of Systems  A comprehensive review of systems was performed and was otherwise negative    Objective:     /66 (Patient Site: Right Arm, Patient Position: Sitting, Cuff Size: Adult Regular)  Pulse 80  Temp 98  F (36.7  C) (Oral)   Wt 139 lb 6.4 oz (63.2 kg)  BMI 28.16 kg/m2    General Appearance: Alert, cooperative, no distress, appears stated age  Back: no CVA tenderness  Lungs: Clear to auscultation bilaterally, respirations unlabored  Heart: Regular rate and rhythm, S1 and S2 normal, no murmur, rub, or gallop   Abdomen: Soft, generalized/diffuse tenderness to bilateral lower abdomen, bowel sounds hyperactive,  no masses, no organomegaly      Valerie Corral, NP-C

## 2021-06-20 NOTE — LETTER
Letter by Myranda Loya MD at      Author: Myranda Loya MD Service: -- Author Type: --    Filed:  Encounter Date: 1/27/2020 Status: (Other)         Missy Vance  [address] [place] [address]             January 27, 2020        Dear Ms. Vance,    Below are the results from your recent visit:    Your pap smear was normal. There was no sign of cervical cancer. You do not have the HPV virus that can cause cervical cancer.    I would recommend that you come in for your annual physical exam in one year.      Please call with questions or contact us using [employer].    Sincerely,        Electronically signed by Myranda Loya MD

## 2021-06-20 NOTE — LETTER
Letter by Myranda Loya MD at      Author: Myranda Loya MD Service: -- Author Type: --    Filed:  Encounter Date: 1/17/2020 Status: Signed         January 17, 2020     Patient: Missy Vance   YOB: 1965   Date of Visit: 1/17/2020       To Whom It May Concern:    Missy Vance has developed intolerance to rubbing alcohol.    Please limit her work-related exposure to rubbing alcohol to no more than 10 minutes per hour. Use rubbing alcohol in well-ventilated area.    If you have any questions or concerns, please don't hesitate to call.    Sincerely,        Electronically signed by Myranda Loya MD

## 2021-06-20 NOTE — LETTER
Letter by Myranda Loya MD at      Author: Myranda Loya MD Service: -- Author Type: --    Filed:  Encounter Date: 1/22/2020 Status: (Other)         Missy Vance  [address] [place] [address]             January 22, 2020         Dear Ms. Vance,       As a valued [facility] patient, your healthcare needs are our priority. Our clinic records indicate we  have attempted to contact you regarding your lab results but your contact number is not working.  Please see below in regards to your lab results.        Your kidney function is stable, but quite low.  You should see the kidney doctor for this, as planned.   Your cholesterol levels are MUCH better.  The LDL went down from 211 to 125.   Your blood sugars are now in the PREDIABETES range.  I would recommend eating fewer carbohydrates  and exercising more to prevent diabetes. You do not have a urine infection.   The remaining of your results are negative.    Please call with questions or contact us using [employer].    Sincerely,        Electronically signed by Myranda Loya MD

## 2021-06-20 NOTE — LETTER
Letter by Myranda Loya MD at      Author: Myranda Loya MD Service: -- Author Type: --    Filed:  Encounter Date: 1/17/2020 Status: Signed         January 17, 2020     Patient: Missy Vance   YOB: 1965   Date of Visit: 1/17/2020       To Whom It May Concern:    Missy Vance has developed intolerance to rubbing alcohol and bleach fumes.    Please limit her work-related exposure to rubbing alcohol or bleach to no more than 10 minutes per hour. And use rubbing alcohol or bleach in well-ventilated area.    If you have any questions or concerns, please don't hesitate to call.    Sincerely,        Electronically signed by Myranda Loya MD

## 2021-06-21 NOTE — LETTER
Letter by Jason Luna MD at      Author: Jason Luna MD Service: -- Author Type: --    Filed:  Encounter Date: 2021 Status: (Other)         Re: Missy Vance    : 1965        To whom it may concern:    This patient was exposed to COVID-19 through her son who tested positive on 2021.    This patient first missed work on January 10.    Because of her exposure she was self quarantining.  She had a negative Covid 19 test on 2021.    Unfortunately she started to have symptoms on 2021.  She will be rechecked for COVID-19 today or tomorrow ( or ).    She should continue to self quarantine.  Further recommendations based on results of the COVID-19 testing on her symptoms.        Sincerely,    Jason Luna MD    Electronically signed on 2021
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
## 2021-07-06 VITALS
TEMPERATURE: 98.2 F | HEART RATE: 63 BPM | BODY MASS INDEX: 29.59 KG/M2 | SYSTOLIC BLOOD PRESSURE: 125 MMHG | DIASTOLIC BLOOD PRESSURE: 80 MMHG | WEIGHT: 146.5 LBS

## 2021-08-01 ENCOUNTER — HEALTH MAINTENANCE LETTER (OUTPATIENT)
Age: 56
End: 2021-08-01

## 2021-08-06 ENCOUNTER — OFFICE VISIT (OUTPATIENT)
Dept: FAMILY MEDICINE | Facility: CLINIC | Age: 56
End: 2021-08-06
Payer: COMMERCIAL

## 2021-08-06 VITALS
BODY MASS INDEX: 29.23 KG/M2 | TEMPERATURE: 96.8 F | RESPIRATION RATE: 12 BRPM | SYSTOLIC BLOOD PRESSURE: 118 MMHG | DIASTOLIC BLOOD PRESSURE: 73 MMHG | HEART RATE: 70 BPM | WEIGHT: 145 LBS | HEIGHT: 59 IN

## 2021-08-06 DIAGNOSIS — Z12.31 VISIT FOR SCREENING MAMMOGRAM: Primary | ICD-10-CM

## 2021-08-06 DIAGNOSIS — N18.32 STAGE 3B CHRONIC KIDNEY DISEASE (H): ICD-10-CM

## 2021-08-06 DIAGNOSIS — I10 ESSENTIAL HYPERTENSION: ICD-10-CM

## 2021-08-06 DIAGNOSIS — E78.2 MIXED HYPERLIPIDEMIA: ICD-10-CM

## 2021-08-06 DIAGNOSIS — R73.03 PREDIABETES: ICD-10-CM

## 2021-08-06 DIAGNOSIS — E66.3 OVERWEIGHT: ICD-10-CM

## 2021-08-06 DIAGNOSIS — E78.2 MODERATE MIXED HYPERLIPIDEMIA NOT REQUIRING STATIN THERAPY: ICD-10-CM

## 2021-08-06 PROBLEM — N18.9 ANEMIA OF RENAL DISEASE: Status: ACTIVE | Noted: 2021-08-06

## 2021-08-06 PROBLEM — R31.29 HEMATURIA, MICROSCOPIC: Status: ACTIVE | Noted: 2020-04-24

## 2021-08-06 PROBLEM — E87.20 METABOLIC ACIDOSIS: Status: ACTIVE | Noted: 2020-04-24

## 2021-08-06 PROBLEM — R80.9 PROTEINURIA: Status: ACTIVE | Noted: 2020-04-24

## 2021-08-06 PROBLEM — N18.30 CKD (CHRONIC KIDNEY DISEASE) STAGE 3, GFR 30-59 ML/MIN (H): Status: ACTIVE | Noted: 2019-08-02

## 2021-08-06 PROBLEM — D63.1 ANEMIA OF RENAL DISEASE: Status: ACTIVE | Noted: 2021-08-06

## 2021-08-06 PROBLEM — R31.9 HEMATURIA: Status: ACTIVE | Noted: 2020-04-24

## 2021-08-06 PROBLEM — N02.B9 IGA NEPHROPATHY: Status: ACTIVE | Noted: 2020-05-29

## 2021-08-06 LAB
ALBUMIN SERPL-MCNC: 3.7 G/DL (ref 3.5–5)
ALP SERPL-CCNC: 102 U/L (ref 45–120)
ALT SERPL W P-5'-P-CCNC: 25 U/L (ref 0–45)
ANION GAP SERPL CALCULATED.3IONS-SCNC: 10 MMOL/L (ref 5–18)
AST SERPL W P-5'-P-CCNC: 19 U/L (ref 0–40)
BILIRUB SERPL-MCNC: 0.2 MG/DL (ref 0–1)
BUN SERPL-MCNC: 55 MG/DL (ref 8–22)
CALCIUM SERPL-MCNC: 9.4 MG/DL (ref 8.5–10.5)
CHLORIDE BLD-SCNC: 110 MMOL/L (ref 98–107)
CHOLEST SERPL-MCNC: 292 MG/DL
CO2 SERPL-SCNC: 22 MMOL/L (ref 22–31)
CREAT SERPL-MCNC: 2.3 MG/DL (ref 0.6–1.1)
FASTING STATUS PATIENT QL REPORTED: ABNORMAL
GFR SERPL CREATININE-BSD FRML MDRD: 23 ML/MIN/1.73M2
GLUCOSE BLD-MCNC: 104 MG/DL (ref 70–125)
HBA1C MFR BLD: 5.7 % (ref 0–5.6)
HDLC SERPL-MCNC: 62 MG/DL
LDLC SERPL CALC-MCNC: 217 MG/DL
POTASSIUM BLD-SCNC: 4.6 MMOL/L (ref 3.5–5)
PROT SERPL-MCNC: 7.6 G/DL (ref 6–8)
SODIUM SERPL-SCNC: 142 MMOL/L (ref 136–145)
TRIGL SERPL-MCNC: 67 MG/DL

## 2021-08-06 PROCEDURE — 82306 VITAMIN D 25 HYDROXY: CPT | Performed by: FAMILY MEDICINE

## 2021-08-06 PROCEDURE — 80061 LIPID PANEL: CPT | Performed by: FAMILY MEDICINE

## 2021-08-06 PROCEDURE — 83036 HEMOGLOBIN GLYCOSYLATED A1C: CPT | Performed by: FAMILY MEDICINE

## 2021-08-06 PROCEDURE — 80053 COMPREHEN METABOLIC PANEL: CPT | Performed by: FAMILY MEDICINE

## 2021-08-06 PROCEDURE — 99213 OFFICE O/P EST LOW 20 MIN: CPT | Mod: 25 | Performed by: FAMILY MEDICINE

## 2021-08-06 PROCEDURE — 99396 PREV VISIT EST AGE 40-64: CPT | Performed by: FAMILY MEDICINE

## 2021-08-06 PROCEDURE — 36415 COLL VENOUS BLD VENIPUNCTURE: CPT | Performed by: FAMILY MEDICINE

## 2021-08-06 RX ORDER — BENAZEPRIL HYDROCHLORIDE 5 MG/1
TABLET ORAL
COMMUNITY
Start: 2021-03-26 | End: 2022-04-07

## 2021-08-06 RX ORDER — ATORVASTATIN CALCIUM 40 MG/1
40 TABLET, FILM COATED ORAL AT BEDTIME
Qty: 90 TABLET | Refills: 4 | Status: SHIPPED | OUTPATIENT
Start: 2021-08-06 | End: 2022-04-07

## 2021-08-06 ASSESSMENT — MIFFLIN-ST. JEOR: SCORE: 1163.35

## 2021-08-06 NOTE — PATIENT INSTRUCTIONS
Schwannoma at L3  A lump of nerve sheath (the wrapper for the nerve) - NOT cancer    From neurosurgery NP 7/2/21: We would plan on repeating a lumbar MRI with and without contrast in 3 months and call her with the results. She will continue to follow with Dr. Tammi Starr. More likely then, we will plan for yearly MRIs with and without contrast to follow the L3 lesion. No surgical intervention is required at this time. If she does need to have an epidural steroid injection, this would be okay.
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
## 2021-08-09 LAB — DEPRECATED CALCIDIOL+CALCIFEROL SERPL-MC: 38 UG/L (ref 30–80)

## 2021-08-11 ENCOUNTER — TELEPHONE (OUTPATIENT)
Dept: FAMILY MEDICINE | Facility: CLINIC | Age: 56
End: 2021-08-11

## 2021-08-11 NOTE — TELEPHONE ENCOUNTER
Faxed forms over.       ----- Message from Myranda Loya MD sent at 8/9/2021 11:59 AM CDT -----  Fax results to Missy's nephrologist.

## 2021-08-20 ENCOUNTER — ANCILLARY PROCEDURE (OUTPATIENT)
Dept: MAMMOGRAPHY | Facility: CLINIC | Age: 56
End: 2021-08-20
Attending: FAMILY MEDICINE
Payer: COMMERCIAL

## 2021-08-20 DIAGNOSIS — Z12.31 VISIT FOR SCREENING MAMMOGRAM: ICD-10-CM

## 2021-08-20 PROCEDURE — 77067 SCR MAMMO BI INCL CAD: CPT | Mod: TC | Performed by: RADIOLOGY

## 2021-09-26 ENCOUNTER — HEALTH MAINTENANCE LETTER (OUTPATIENT)
Age: 56
End: 2021-09-26

## 2021-12-08 DIAGNOSIS — Z23 IMMUNIZATION DUE: Primary | ICD-10-CM

## 2021-12-10 ENCOUNTER — TELEPHONE (OUTPATIENT)
Dept: FAMILY MEDICINE | Facility: CLINIC | Age: 56
End: 2021-12-10
Payer: COMMERCIAL

## 2021-12-10 NOTE — TELEPHONE ENCOUNTER
Patient has a future F2F appointment on 12/11/2021 for a nurse visit only. Completing task.    Declined flu shot, only wants booster shot

## 2021-12-10 NOTE — TELEPHONE ENCOUNTER
Schedule flu shot and Covid booster  Received: 2 days ago  Myranda Loya MD sent to [facility] Scheduling Registration Pool  Schedule flu shot and Covid booster

## 2021-12-11 ENCOUNTER — IMMUNIZATION (OUTPATIENT)
Dept: NURSING | Facility: CLINIC | Age: 56
End: 2021-12-11
Payer: COMMERCIAL

## 2021-12-11 PROCEDURE — 91300 PR COVID VAC PFIZER DIL RECON 30 MCG/0.3 ML IM: CPT

## 2021-12-11 PROCEDURE — 0004A PR COVID VAC PFIZER DIL RECON 30 MCG/0.3 ML IM: CPT

## 2022-03-09 DIAGNOSIS — R73.03 PREDIABETES: ICD-10-CM

## 2022-03-09 DIAGNOSIS — N18.4 CHRONIC KIDNEY DISEASE, STAGE 4 (SEVERE) (H): Primary | ICD-10-CM

## 2022-03-09 DIAGNOSIS — N18.9 ANEMIA OF RENAL DISEASE: ICD-10-CM

## 2022-03-09 DIAGNOSIS — D63.1 ANEMIA OF RENAL DISEASE: ICD-10-CM

## 2022-03-09 DIAGNOSIS — E78.2 MIXED HYPERLIPIDEMIA: ICD-10-CM

## 2022-03-09 DIAGNOSIS — N02.B9 IGA NEPHROPATHY: ICD-10-CM

## 2022-03-09 DIAGNOSIS — I10 PRIMARY HYPERTENSION: ICD-10-CM

## 2022-03-09 DIAGNOSIS — I10 ESSENTIAL HYPERTENSION: ICD-10-CM

## 2022-03-10 RX ORDER — AMLODIPINE AND BENAZEPRIL HYDROCHLORIDE 5; 10 MG/1; MG/1
CAPSULE ORAL
Qty: 90 CAPSULE | Refills: 0 | Status: SHIPPED | OUTPATIENT
Start: 2022-03-10 | End: 2022-04-07

## 2022-03-10 NOTE — TELEPHONE ENCOUNTER
"Routing refill request to provider for review/approval because:  Labs out of range:  Creatinine 2.3  Allergy warning.     Last Written Prescription Date:  6/14/2021  Last Fill Quantity: 90,  # refills: 2   Last office visit provider:  8/6/2021     Requested Prescriptions   Pending Prescriptions Disp Refills     amLODIPine-benazepril (LOTREL) 5-10 MG capsule [Pharmacy Med Name: AMLODIPINE-BENAZEPR 5-10 5-10 Capsule] 90 capsule 2     Sig: TAKE 1 PILL BY MOUTH EVERY DAY/TXHUA HNUB NOJ 1 LUB TSHUAJ PAB ZOO NTSHAV SIAB       Calcium Channel Blockers Protocol  Failed - 3/10/2022  9:25 AM        Failed - Normal serum creatinine on file in past 12 months     Recent Labs   Lab Test 08/06/21  0828   CR 2.30*       Ok to refill medication if creatinine is low          Passed - Blood pressure under 140/90 in past 12 months     BP Readings from Last 3 Encounters:   08/06/21 118/73   06/14/21 125/80   02/26/21 132/75                 Passed - Recent (12 mo) or future (30 days) visit within the authorizing provider's specialty     Patient has had an office visit with the authorizing provider or a provider within the authorizing providers department within the previous 12 mos or has a future within next 30 days. See \"Patient Info\" tab in inbasket, or \"Choose Columns\" in Meds & Orders section of the refill encounter.              Passed - Medication is active on med list        Passed - Patient is age 18 or older        Passed - No active pregnancy on record        Passed - No positive pregnancy test in past 12 months       ACE Inhibitors (Including Combos) Protocol Failed - 3/10/2022  9:25 AM        Failed - Normal serum creatinine on file in past 12 months     Recent Labs   Lab Test 08/06/21  0828   CR 2.30*       Ok to refill medication if creatinine is low          Passed - Blood pressure under 140/90 in past 12 months     BP Readings from Last 3 Encounters:   08/06/21 118/73   06/14/21 125/80   02/26/21 132/75                 " "Passed - Recent (12 mo) or future (30 days) visit within the authorizing provider's specialty     Patient has had an office visit with the authorizing provider or a provider within the authorizing providers department within the previous 12 mos or has a future within next 30 days. See \"Patient Info\" tab in inbasket, or \"Choose Columns\" in Meds & Orders section of the refill encounter.              Passed - Medication is active on med list        Passed - Patient is age 18 or older        Passed - No active pregnancy on record        Passed - Normal serum potassium on file in past 12 months     Recent Labs   Lab Test 08/06/21  0828   POTASSIUM 4.6             Passed - No positive pregnancy test within past 12 months             Angella Argueta RN 03/10/22 9:25 AM  "

## 2022-03-10 NOTE — TELEPHONE ENCOUNTER
Please schedule lab visit now and a physical in a month or so.    No future appointments.   Health Maintenance Due   Topic Date Due     PARATHYROID  Never done     PHOSPHORUS  Never done     MICROALBUMIN  05/26/2021     HEMOGLOBIN  08/26/2021     INFLUENZA VACCINE (1) 09/01/2021     BMP  11/06/2021     PHQ-2 (once per calendar year)  01/01/2022     PREVENTIVE CARE VISIT  02/26/2022     BP Readings from Last 3 Encounters:   08/06/21 118/73   06/14/21 125/80   02/26/21 132/75      Missy was seen today for medication refill.    Diagnoses and all orders for this visit:    Chronic kidney disease, stage 4 (severe) (H)  -     Vitamin D Deficiency; Future  -     Comprehensive metabolic panel; Future  -     Albumin Random Urine Quantitative with Creat Ratio; Future  -     Parathyroid Hormone Intact; Future    Essential hypertension  -     amLODIPine-benazepril (LOTREL) 5-10 MG capsule; TAKE 1 PILL BY MOUTH EVERY DAY/TXHUA HNUB NOJ 1 LUB TSHUAJ PAB ZOO NTSHAV SIAB    Primary hypertension    Mixed hyperlipidemia  -     Lipid Profile; Future    IgA nephropathy    Prediabetes  -     Hemoglobin A1c; Future    Anemia of renal disease  -     CBC with platelets; Future

## 2022-03-11 NOTE — TELEPHONE ENCOUNTER
Left message #1 at [phone]. Postponing task out to a week and will try again. If patient returns call back, please help patient schedule an appointment per message below. Thanks!

## 2022-04-07 ENCOUNTER — OFFICE VISIT (OUTPATIENT)
Dept: FAMILY MEDICINE | Facility: CLINIC | Age: 57
End: 2022-04-07
Payer: COMMERCIAL

## 2022-04-07 VITALS
TEMPERATURE: 98.6 F | OXYGEN SATURATION: 99 % | DIASTOLIC BLOOD PRESSURE: 80 MMHG | HEIGHT: 59 IN | SYSTOLIC BLOOD PRESSURE: 127 MMHG | HEART RATE: 79 BPM | RESPIRATION RATE: 16 BRPM | BODY MASS INDEX: 28.63 KG/M2 | WEIGHT: 142 LBS

## 2022-04-07 DIAGNOSIS — N18.9 ANEMIA OF RENAL DISEASE: ICD-10-CM

## 2022-04-07 DIAGNOSIS — R73.03 PREDIABETES: ICD-10-CM

## 2022-04-07 DIAGNOSIS — D64.9 ANEMIA, UNSPECIFIED TYPE: ICD-10-CM

## 2022-04-07 DIAGNOSIS — Z00.00 ROUTINE GENERAL MEDICAL EXAMINATION AT A HEALTH CARE FACILITY: Primary | ICD-10-CM

## 2022-04-07 DIAGNOSIS — E78.2 MIXED HYPERLIPIDEMIA: ICD-10-CM

## 2022-04-07 DIAGNOSIS — R21 RASH: ICD-10-CM

## 2022-04-07 DIAGNOSIS — D63.1 ANEMIA OF RENAL DISEASE: ICD-10-CM

## 2022-04-07 DIAGNOSIS — I10 ESSENTIAL HYPERTENSION: ICD-10-CM

## 2022-04-07 DIAGNOSIS — N18.4 CHRONIC KIDNEY DISEASE, STAGE 4 (SEVERE) (H): ICD-10-CM

## 2022-04-07 LAB
ERYTHROCYTE [DISTWIDTH] IN BLOOD BY AUTOMATED COUNT: 12.8 % (ref 10–15)
HBA1C MFR BLD: 5.5 % (ref 0–5.6)
HCT VFR BLD AUTO: 32.5 % (ref 35–47)
HGB BLD-MCNC: 10.6 G/DL (ref 11.7–15.7)
IRON SATN MFR SERPL: 27 % (ref 15–46)
IRON SERPL-MCNC: 65 UG/DL (ref 35–180)
MCH RBC QN AUTO: 27.9 PG (ref 26.5–33)
MCHC RBC AUTO-ENTMCNC: 32.6 G/DL (ref 31.5–36.5)
MCV RBC AUTO: 86 FL (ref 78–100)
PLATELET # BLD AUTO: 243 10E3/UL (ref 150–450)
PTH-INTACT SERPL-MCNC: 46 PG/ML (ref 10–86)
RBC # BLD AUTO: 3.8 10E6/UL (ref 3.8–5.2)
TIBC SERPL-MCNC: 240 UG/DL (ref 240–430)
WBC # BLD AUTO: 6.7 10E3/UL (ref 4–11)

## 2022-04-07 PROCEDURE — 82306 VITAMIN D 25 HYDROXY: CPT | Performed by: FAMILY MEDICINE

## 2022-04-07 PROCEDURE — 83036 HEMOGLOBIN GLYCOSYLATED A1C: CPT | Performed by: FAMILY MEDICINE

## 2022-04-07 PROCEDURE — 83970 ASSAY OF PARATHORMONE: CPT | Performed by: FAMILY MEDICINE

## 2022-04-07 PROCEDURE — 84100 ASSAY OF PHOSPHORUS: CPT | Performed by: FAMILY MEDICINE

## 2022-04-07 PROCEDURE — 80053 COMPREHEN METABOLIC PANEL: CPT | Performed by: FAMILY MEDICINE

## 2022-04-07 PROCEDURE — 85027 COMPLETE CBC AUTOMATED: CPT | Performed by: FAMILY MEDICINE

## 2022-04-07 PROCEDURE — 36415 COLL VENOUS BLD VENIPUNCTURE: CPT | Performed by: FAMILY MEDICINE

## 2022-04-07 PROCEDURE — 83550 IRON BINDING TEST: CPT | Performed by: FAMILY MEDICINE

## 2022-04-07 PROCEDURE — 99396 PREV VISIT EST AGE 40-64: CPT | Performed by: FAMILY MEDICINE

## 2022-04-07 PROCEDURE — 86706 HEP B SURFACE ANTIBODY: CPT | Performed by: FAMILY MEDICINE

## 2022-04-07 PROCEDURE — 82746 ASSAY OF FOLIC ACID SERUM: CPT | Performed by: FAMILY MEDICINE

## 2022-04-07 PROCEDURE — 80061 LIPID PANEL: CPT | Performed by: FAMILY MEDICINE

## 2022-04-07 PROCEDURE — 82607 VITAMIN B-12: CPT | Performed by: FAMILY MEDICINE

## 2022-04-07 RX ORDER — SODIUM BICARBONATE 650 MG/1
650 TABLET ORAL 3 TIMES DAILY
Qty: 360 TABLET | Refills: 4 | Status: SHIPPED | OUTPATIENT
Start: 2022-04-07 | End: 2023-03-31

## 2022-04-07 RX ORDER — TRIAMCINOLONE ACETONIDE 5 MG/G
OINTMENT TOPICAL
Qty: 15 G | Refills: 1 | Status: SHIPPED | OUTPATIENT
Start: 2022-04-07 | End: 2023-03-31

## 2022-04-07 RX ORDER — AMLODIPINE AND BENAZEPRIL HYDROCHLORIDE 5; 10 MG/1; MG/1
1 CAPSULE ORAL DAILY
Qty: 90 CAPSULE | Refills: 4 | Status: SHIPPED | OUTPATIENT
Start: 2022-04-07 | End: 2022-04-07

## 2022-04-07 RX ORDER — AMLODIPINE AND BENAZEPRIL HYDROCHLORIDE 5; 20 MG/1; MG/1
1 CAPSULE ORAL DAILY
Qty: 90 CAPSULE | Refills: 3 | Status: SHIPPED | OUTPATIENT
Start: 2022-04-07 | End: 2023-03-31

## 2022-04-07 RX ORDER — ATORVASTATIN CALCIUM 40 MG/1
40 TABLET, FILM COATED ORAL AT BEDTIME
Qty: 90 TABLET | Refills: 4 | Status: SHIPPED | OUTPATIENT
Start: 2022-04-07 | End: 2023-01-23

## 2022-04-07 NOTE — PROGRESS NOTES
SUBJECTIVE:   CC: Missy Vance is an 56 year old woman who presents for preventive health visit.     Patient has been advised of split billing requirements and indicates understanding: Yes  Healthy Habits:     Taking medications regularly:  0    PHQ-2 Total Score: 0  History of Present Illness       Hypertension: She presents for follow up of hypertension.  She does check blood pressure  regularly outside of the clinic. Outpatient blood pressures have not been over 140/90. She follows a low salt diet.     She eats 2-3 servings of fruits and vegetables daily.She consumes 0 sweetened beverage(s) daily.She exercises with enough effort to increase her heart rate 10 to 19 minutes per day.  She exercises with enough effort to increase her heart rate 4 days per week.   She is taking medications regularly.    Rash   Left upper abdomen  Cream not working   Very itching - feels like bugs    Today's PHQ-2 Score:   PHQ-2 ( 1999 Pfizer) 4/7/2022   Q1: Little interest or pleasure in doing things 0   Q2: Feeling down, depressed or hopeless 0   PHQ-2 Score 0   PHQ-2 Total Score (12-17 Years)- Positive if 3 or more points; Administer PHQ-A if positive -   Q1: Little interest or pleasure in doing things Not at all   Q2: Feeling down, depressed or hopeless Not at all   PHQ-2 Score 0     Abuse: Current or Past (Physical, Sexual or Emotional) - No  Do you feel safe in your environment? Yes    Social History     Tobacco Use     Smoking status: Never Smoker     Smokeless tobacco: Never Used     Tobacco comment: no passive exposure   Substance Use Topics     Alcohol use: No     If you drink alcohol do you typically have >3 drinks per day or >7 drinks per week? No    No flowsheet data found.    Reviewed orders with patient.  Reviewed health maintenance and updated orders accordingly - No    Breast Cancer Screening:  Any new diagnosis of family breast, ovarian, or bowel cancer? No    FHS-7:   Breast CA Risk Assessment (FHS-7) 8/20/2021  "  Did any of your first-degree relatives have breast or ovarian cancer? No   Did any of your relatives have bilateral breast cancer? No   Did any man in your family have breast cancer? No   Did any woman in your family have breast and ovarian cancer? No   Did any woman in your family have breast cancer before age 50 y? No   Do you have 2 or more relatives with breast and/or ovarian cancer? No   Do you have 2 or more relatives with breast and/or bowel cancer? No     Pertinent mammograms are reviewed under the imaging tab.    History of abnormal Pap smear: NO - age 30- 65 PAP every 3 years recommended  NO - age 30-65 PAP every 5 years with negative HPV co-testing recommended  PAP / HPV Latest Ref Rng & Units 1/17/2020   PAP Negative for squamous intraepithelial lesion or malignancy. Negative for squamous intraepithelial lesion or malignancy  Electronically signed by Zita Robles CT (ASCP) on 1/24/2020 at  2:27 PM     HPV16 NEG Negative   HPV18 NEG Negative   HRHPV NEG Negative     Reviewed and updated as needed this visit by clinical staff   Tobacco  Allergies  Meds  Problems  Med Hx  Surg Hx  Fam Hx  Soc   Hx        Reviewed and updated as needed this visit by Provider   Tobacco  Allergies  Meds  Problems  Med Hx  Surg Hx  Fam Hx              OBJECTIVE:   /80 (BP Location: Left arm, Patient Position: Sitting, Cuff Size: Adult Regular)   Pulse 79   Temp 98.6  F (37  C) (Temporal)   Resp 16   Ht 1.499 m (4' 11\")   Wt 64.4 kg (142 lb)   SpO2 99%   BMI 28.68 kg/m    Physical Exam  GENERAL: healthy, alert and no distress  EYES: Eyes grossly normal to inspection, PERRL and conjunctivae and sclerae normal  HENT: ear canals and TM's normal, nose and mouth without ulcers or lesions  NECK: no adenopathy, no asymmetry, masses, or scars and thyroid normal to palpation  RESP: lungs clear to auscultation - no rales, rhonchi or wheezes  BREAST: normal without masses, tenderness or nipple discharge " and no palpable axillary masses or adenopathy  CV: regular rate and rhythm, normal S1 S2, no S3 or S4, no murmur, click or rub, no peripheral edema and peripheral pulses strong  ABDOMEN: soft, nontender, no hepatosplenomegaly, no masses and bowel sounds normal  MS: no gross musculoskeletal defects noted, no edema  SKIN: 6x3.5 cm rash left upper abdomen. Thickening of skin and red/brown coloration.  NEURO: Normal strength and tone, mentation intact and speech normal  PSYCH: mentation appears normal, affect normal/bright    Diagnostic Test Results:  Labs reviewed in Epic    ASSESSMENT/PLAN:   Missy was seen today for physical.    Diagnoses and all orders for this visit:    Routine general medical examination at a health care facility    Chronic kidney disease, stage 4 (severe) (H)  -     Phosphorus; Future  -     Hemoglobin; Future  -     Cancel: Renal panel; Future  -     sodium bicarbonate 650 MG tablet; Take 1 tablet (650 mg) by mouth 3 times daily  -     amLODIPine-benazepril (LOTREL) 5-20 MG capsule; Take 1 capsule by mouth daily  -     Comprehensive metabolic panel (BMP + Alb, Alk Phos, ALT, AST, Total. Bili, TP); Future  -     Hepatitis B Surface Antibody; Future  -     COVID-19,PF,MODERNA (18+ YRS BOOSTER .25ML); Future  -     Vitamin D Deficiency  -     Comprehensive metabolic panel  -     Parathyroid Hormone Intact  -     Phosphorus  -     Cancel: Hemoglobin  -     Cancel: Comprehensive metabolic panel (BMP + Alb, Alk Phos, ALT, AST, Total. Bili, TP)  -     Hepatitis B Surface Antibody    Mixed hyperlipidemia  -     Lipid panel reflex to direct LDL Fasting; Future  -     atorvastatin (LIPITOR) 40 MG tablet; Take 1 tablet (40 mg) by mouth At Bedtime  -     Cancel: Lipid Profile  -     Lipid panel reflex to direct LDL Fasting    Essential hypertension  -     Discontinue: amLODIPine-benazepril (LOTREL) 5-10 MG capsule; Take 1 capsule by mouth daily    Rash  -     Adult Dermatology Referral; Future  -      "triamcinolone (KENALOG) 0.5 % external ointment; Apply topically to rash on left upper abdomen twice daily for up to 14 days.    Prediabetes  -     Hemoglobin A1c    Anemia of renal disease  -     CBC with platelets    Anemia, unspecified type  -     Iron and iron binding capacity; Future  -     Vitamin B12; Future  -     Folate; Future  -     Iron and iron binding capacity  -     Vitamin B12  -     Folate    Other orders  -     Cancel: INFLUENZA QUAD, RECOMBINANT, P-FREE (RIV4) (FLUBLOK)        COUNSELING:  Reviewed preventive health counseling, as reflected in patient instructions    Estimated body mass index is 28.68 kg/m  as calculated from the following:    Height as of this encounter: 1.499 m (4' 11\").    Weight as of this encounter: 64.4 kg (142 lb).    Weight management plan: Discussed healthy diet and exercise guidelines    She reports that she has never smoked. She has never used smokeless tobacco.      Counseling Resources:  ATP IV Guidelines  Pooled Cohorts Equation Calculator  Breast Cancer Risk Calculator  BRCA-Related Cancer Risk Assessment: FHS-7 Tool  FRAX Risk Assessment  ICSI Preventive Guidelines  Dietary Guidelines for Americans, 2010  USDA's MyPlate  ASA Prophylaxis  Lung CA Screening    Myranda Loya MD  [facility]  "

## 2022-04-07 NOTE — PATIENT INSTRUCTIONS

## 2022-04-08 PROBLEM — R21 RASH: Status: ACTIVE | Noted: 2022-04-08

## 2022-04-08 LAB
ALBUMIN SERPL-MCNC: 3.6 G/DL (ref 3.5–5)
ALP SERPL-CCNC: 100 U/L (ref 45–120)
ALT SERPL W P-5'-P-CCNC: 13 U/L (ref 0–45)
ANION GAP SERPL CALCULATED.3IONS-SCNC: 11 MMOL/L (ref 5–18)
AST SERPL W P-5'-P-CCNC: 16 U/L (ref 0–40)
BILIRUB SERPL-MCNC: 0.2 MG/DL (ref 0–1)
BUN SERPL-MCNC: 41 MG/DL (ref 8–22)
CALCIUM SERPL-MCNC: 9.3 MG/DL (ref 8.5–10.5)
CHLORIDE BLD-SCNC: 111 MMOL/L (ref 98–107)
CHOLEST SERPL-MCNC: 256 MG/DL
CO2 SERPL-SCNC: 20 MMOL/L (ref 22–31)
CREAT SERPL-MCNC: 2.01 MG/DL (ref 0.6–1.1)
DEPRECATED CALCIDIOL+CALCIFEROL SERPL-MC: 53 UG/L (ref 20–75)
FASTING STATUS PATIENT QL REPORTED: NO
FOLATE SERPL-MCNC: 11.1 NG/ML
GFR SERPL CREATININE-BSD FRML MDRD: 29 ML/MIN/1.73M2
GLUCOSE BLD-MCNC: 105 MG/DL (ref 70–125)
HBV SURFACE AB SERPLBLD QL IA.RAPID: POSITIVE
HDLC SERPL-MCNC: 62 MG/DL
LDLC SERPL CALC-MCNC: 179 MG/DL
PHOSPHATE SERPL-MCNC: 4.2 MG/DL (ref 2.5–4.5)
POTASSIUM BLD-SCNC: 4.8 MMOL/L (ref 3.5–5)
PROT SERPL-MCNC: 7.2 G/DL (ref 6–8)
SODIUM SERPL-SCNC: 142 MMOL/L (ref 136–145)
TRIGL SERPL-MCNC: 75 MG/DL
VIT B12 SERPL-MCNC: 466 PG/ML (ref 213–816)

## 2022-05-18 ENCOUNTER — TRANSFERRED RECORDS (OUTPATIENT)
Dept: HEALTH INFORMATION MANAGEMENT | Facility: CLINIC | Age: 57
End: 2022-05-18
Payer: COMMERCIAL

## 2023-01-23 ENCOUNTER — OFFICE VISIT (OUTPATIENT)
Dept: FAMILY MEDICINE | Facility: CLINIC | Age: 58
End: 2023-01-23
Payer: COMMERCIAL

## 2023-01-23 VITALS
BODY MASS INDEX: 28.63 KG/M2 | HEIGHT: 59 IN | HEART RATE: 69 BPM | SYSTOLIC BLOOD PRESSURE: 104 MMHG | WEIGHT: 142 LBS | OXYGEN SATURATION: 99 % | DIASTOLIC BLOOD PRESSURE: 68 MMHG

## 2023-01-23 DIAGNOSIS — R73.03 PREDIABETES: ICD-10-CM

## 2023-01-23 DIAGNOSIS — D63.1 ANEMIA OF RENAL DISEASE: ICD-10-CM

## 2023-01-23 DIAGNOSIS — E78.5 HYPERLIPIDEMIA, UNSPECIFIED HYPERLIPIDEMIA TYPE: ICD-10-CM

## 2023-01-23 DIAGNOSIS — M10.371 ACUTE GOUT DUE TO RENAL IMPAIRMENT INVOLVING RIGHT FOOT: ICD-10-CM

## 2023-01-23 DIAGNOSIS — N18.4 CHRONIC KIDNEY DISEASE, STAGE 4 (SEVERE) (H): Primary | ICD-10-CM

## 2023-01-23 DIAGNOSIS — N18.9 ANEMIA OF RENAL DISEASE: ICD-10-CM

## 2023-01-23 DIAGNOSIS — E78.5 HYPERLIPIDEMIA LDL GOAL <130: ICD-10-CM

## 2023-01-23 DIAGNOSIS — E78.2 MIXED HYPERLIPIDEMIA: ICD-10-CM

## 2023-01-23 PROBLEM — R21 RASH: Status: RESOLVED | Noted: 2022-04-08 | Resolved: 2023-01-23

## 2023-01-23 PROBLEM — I10 HYPERTENSION GOAL BP (BLOOD PRESSURE) < 130/80: Status: ACTIVE | Noted: 2020-04-24

## 2023-01-23 LAB
ALBUMIN SERPL BCG-MCNC: 4.1 G/DL (ref 3.5–5.2)
ALBUMIN UR-MCNC: 100 MG/DL
ALP SERPL-CCNC: 97 U/L (ref 35–104)
ALT SERPL W P-5'-P-CCNC: 16 U/L (ref 10–35)
ANION GAP SERPL CALCULATED.3IONS-SCNC: 16 MMOL/L (ref 7–15)
APPEARANCE UR: CLEAR
AST SERPL W P-5'-P-CCNC: 22 U/L (ref 10–35)
BACTERIA #/AREA URNS HPF: ABNORMAL /HPF
BILIRUB SERPL-MCNC: 0.2 MG/DL
BILIRUB UR QL STRIP: NEGATIVE
BUN SERPL-MCNC: 52.4 MG/DL (ref 6–20)
CALCIUM SERPL-MCNC: 9.1 MG/DL (ref 8.6–10)
CHLORIDE SERPL-SCNC: 107 MMOL/L (ref 98–107)
CHOLEST SERPL-MCNC: 266 MG/DL
COLOR UR AUTO: YELLOW
CREAT SERPL-MCNC: 2.52 MG/DL (ref 0.51–0.95)
CREAT UR-MCNC: 55.4 MG/DL
DEPRECATED CALCIDIOL+CALCIFEROL SERPL-MC: 55 UG/L (ref 20–75)
DEPRECATED HCO3 PLAS-SCNC: 15 MMOL/L (ref 22–29)
GFR SERPL CREATININE-BSD FRML MDRD: 22 ML/MIN/1.73M2
GLUCOSE SERPL-MCNC: 92 MG/DL (ref 70–99)
GLUCOSE UR STRIP-MCNC: NEGATIVE MG/DL
HBA1C MFR BLD: 5.9 % (ref 0–5.6)
HDLC SERPL-MCNC: 66 MG/DL
HGB BLD-MCNC: 11.2 G/DL (ref 11.7–15.7)
HGB UR QL STRIP: ABNORMAL
IRON BINDING CAPACITY (ROCHE): 271 UG/DL (ref 240–430)
IRON SATN MFR SERPL: 24 % (ref 15–46)
IRON SERPL-MCNC: 66 UG/DL (ref 37–145)
KETONES UR STRIP-MCNC: NEGATIVE MG/DL
LDLC SERPL CALC-MCNC: 182 MG/DL
LEUKOCYTE ESTERASE UR QL STRIP: ABNORMAL
MAGNESIUM SERPL-MCNC: 1.8 MG/DL (ref 1.7–2.3)
MICROALBUMIN UR-MCNC: 505 MG/L
MICROALBUMIN/CREAT UR: 911.55 MG/G CR (ref 0–25)
NITRATE UR QL: NEGATIVE
NONHDLC SERPL-MCNC: 200 MG/DL
PH UR STRIP: 5 [PH] (ref 5–8)
PHOSPHATE SERPL-MCNC: 4.1 MG/DL (ref 2.5–4.5)
POTASSIUM SERPL-SCNC: 5 MMOL/L (ref 3.4–5.3)
PROT SERPL-MCNC: 7.7 G/DL (ref 6.4–8.3)
PTH-INTACT SERPL-MCNC: 53 PG/ML (ref 15–65)
RBC #/AREA URNS AUTO: ABNORMAL /HPF
SODIUM SERPL-SCNC: 138 MMOL/L (ref 136–145)
SP GR UR STRIP: 1.01 (ref 1–1.03)
SQUAMOUS #/AREA URNS AUTO: ABNORMAL /LPF
TRIGL SERPL-MCNC: 89 MG/DL
URATE SERPL-MCNC: 9.1 MG/DL (ref 2.4–5.7)
UROBILINOGEN UR STRIP-ACNC: 0.2 E.U./DL
WBC #/AREA URNS AUTO: ABNORMAL /HPF

## 2023-01-23 PROCEDURE — 81001 URINALYSIS AUTO W/SCOPE: CPT | Performed by: FAMILY MEDICINE

## 2023-01-23 PROCEDURE — 0124A COVID-19 VACCINE BIVALENT BOOSTER 12+ (PFIZER): CPT | Performed by: FAMILY MEDICINE

## 2023-01-23 PROCEDURE — 80061 LIPID PANEL: CPT | Performed by: FAMILY MEDICINE

## 2023-01-23 PROCEDURE — 83540 ASSAY OF IRON: CPT | Performed by: FAMILY MEDICINE

## 2023-01-23 PROCEDURE — 85018 HEMOGLOBIN: CPT | Performed by: FAMILY MEDICINE

## 2023-01-23 PROCEDURE — 83735 ASSAY OF MAGNESIUM: CPT | Performed by: FAMILY MEDICINE

## 2023-01-23 PROCEDURE — 83970 ASSAY OF PARATHORMONE: CPT | Performed by: FAMILY MEDICINE

## 2023-01-23 PROCEDURE — 84100 ASSAY OF PHOSPHORUS: CPT | Performed by: FAMILY MEDICINE

## 2023-01-23 PROCEDURE — 82570 ASSAY OF URINE CREATININE: CPT | Performed by: FAMILY MEDICINE

## 2023-01-23 PROCEDURE — 82306 VITAMIN D 25 HYDROXY: CPT | Performed by: FAMILY MEDICINE

## 2023-01-23 PROCEDURE — 83036 HEMOGLOBIN GLYCOSYLATED A1C: CPT | Performed by: FAMILY MEDICINE

## 2023-01-23 PROCEDURE — 36415 COLL VENOUS BLD VENIPUNCTURE: CPT | Performed by: FAMILY MEDICINE

## 2023-01-23 PROCEDURE — 83550 IRON BINDING TEST: CPT | Performed by: FAMILY MEDICINE

## 2023-01-23 PROCEDURE — 82043 UR ALBUMIN QUANTITATIVE: CPT | Performed by: FAMILY MEDICINE

## 2023-01-23 PROCEDURE — 91312 COVID-19 VACCINE BIVALENT BOOSTER 12+ (PFIZER): CPT | Performed by: FAMILY MEDICINE

## 2023-01-23 PROCEDURE — 99214 OFFICE O/P EST MOD 30 MIN: CPT | Performed by: FAMILY MEDICINE

## 2023-01-23 PROCEDURE — 84550 ASSAY OF BLOOD/URIC ACID: CPT | Performed by: FAMILY MEDICINE

## 2023-01-23 PROCEDURE — 80053 COMPREHEN METABOLIC PANEL: CPT | Performed by: FAMILY MEDICINE

## 2023-01-23 RX ORDER — ALLOPURINOL 300 MG/1
300 TABLET ORAL DAILY
Qty: 90 TABLET | Refills: 3 | Status: SHIPPED | OUTPATIENT
Start: 2023-01-23 | End: 2023-03-31

## 2023-01-23 RX ORDER — PREDNISONE 20 MG/1
TABLET ORAL
Qty: 13 TABLET | Refills: 0 | Status: SHIPPED | OUTPATIENT
Start: 2023-01-23 | End: 2023-02-01

## 2023-01-23 RX ORDER — ATORVASTATIN CALCIUM 40 MG/1
40 TABLET, FILM COATED ORAL AT BEDTIME
Qty: 90 TABLET | Refills: 4 | Status: SHIPPED | OUTPATIENT
Start: 2023-01-23 | End: 2023-03-31

## 2023-01-23 NOTE — PATIENT INSTRUCTIONS
Gout    Eat plant-based proteins, instead of fish and meat. Good sources of protein include: eggs, low-fat dairy (milk, cheese, yogurt), tofu, soy milk, beans, peas, nuts, seeds, and quinoa.  Eat more cherries! They may help lower your uric acid.  Eat more low-fat dairy (milk, cheese, yogurt, cottage cheese).  Consider taking a fish oil supplement.  Limit alcohol.  Limit high fructose corn syrup (especially pop).  Lose weight.     Prednisone  5 days - 2 pills  2 days - 1 pill  2 days - 1/2 pill    Allopurinol  Take once a day to help your body remove the uric acid.

## 2023-01-23 NOTE — PROGRESS NOTES
"Office Visit  [facility]  Date of Service: Jan 23, 2023      Subjective   Missy Vance is a 57 year old female who presents for   Chief Complaint   Patient presents with     Foot Pain     Right foot pain since 11/2022     Right foot pain  Ball of foot at base of 2nd and 3rd toes  Redness, swelling, pain  Has had Two episodes  Lasted 2 weeks -severe, could not work.    Declines flu shot  PHQ 1/25/2021   PHQ-9 Total Score 7   Q9: Thoughts of better off dead/self-harm past 2 weeks Not at all     No flowsheet data found. No flowsheet data found.   Objective   /68 (BP Location: Right arm, Patient Position: Sitting, Cuff Size: Adult Regular)   Pulse 69   Ht 1.499 m (4' 11\")   Wt 64.4 kg (142 lb)   SpO2 99%   BMI 28.68 kg/m   She reports that she has never smoked. She has never used smokeless tobacco.    Gen: Alert, no apparent distress.  Foot: There is some swelling over the distal second and third metatarsals without redness.    No results found for any visits on 01/23/23.  Assessment & Plan     1. Right foot pain.  Likely due to gout, secondary to chronic kidney disease.  Check labs.  Treat with prednisone 40 mg daily for 5 days, 20 mg daily for 2 days, 10 mg daily for 2 days, then stop.  Reviewed diet to reduce uric acid.  Start allopurinol 300 mg daily.  2. Chronic kidney disease, stage IV.  Labs obtained.  Blood pressure is at goal.      Order Summary                                                      Chronic kidney disease, stage 4 (severe) (H)  -     Hemoglobin; Future  -     Comprehensive metabolic panel (BMP + Alb, Alk Phos, ALT, AST, Total. Bili, TP); Future  -     UA reflex to Microscopic - lab collect; Future  -     Magnesium; Future  -     Phosphorus; Future  -     Vitamin D deficiency screening; Future  -     Parathyroid Hormone Intact; Future  -     Uric acid; Future  -     allopurinol (ZYLOPRIM) 300 MG tablet; Take 1 tablet (300 mg) by mouth " daily    Anemia of renal disease  -     Iron and iron binding capacity; Future    Prediabetes  -     Hemoglobin A1c; Future    Hyperlipidemia, unspecified hyperlipidemia type  -     Lipid panel reflex to direct LDL Fasting; Future    Hyperlipidemia LDL goal <130    Mixed hyperlipidemia  -     atorvastatin (LIPITOR) 40 MG tablet; Take 1 tablet (40 mg) by mouth At Bedtime    Acute gout due to renal impairment involving right foot  -     Albumin Random Urine Quantitative with Creat Ratio; Future  -     predniSONE (DELTASONE) 20 MG tablet; Take 2 tablets (40 mg) by mouth daily for 5 days, THEN 1 tablet (20 mg) daily for 2 days, THEN 0.5 tablets (10 mg) daily for 2 days.    Other orders  -     REVIEW OF HEALTH MAINTENANCE PROTOCOL ORDERS  -     INFLUENZA VACCINE 50-64 OR 18-64 W/EGG ALLERGY (FLUBLOK)  -     COVID-19,PF,PFIZER BOOSTER BIVALENT (12+YRS)            Future Appointments   Date Time Provider Department Center   1/23/2023 10:40 AM Myranda Loya MD [facility]       Completed by: Myranda Loya M.D., [facility]. 1/23/2023 10:39 AM.  This transcription uses voice recognition software, which may contain typographical errors.  MDM: [place] neighborhood: [place] [address], language: Hmong, .    Answers for HPI/ROS submitted by the patient on 1/23/2023  What is the reason for your visit today?: Right Foot Pain  When did your symptoms begin?: More than a month  How would you describe these symptoms?: Moderate  Are your symptoms:: Worsening  Have you had these symptoms before?: No  Is there anything that makes you feel worse?: None  Is there anything that makes you feel better?: None

## 2023-02-01 ENCOUNTER — TELEPHONE (OUTPATIENT)
Dept: FAMILY MEDICINE | Facility: CLINIC | Age: 58
End: 2023-02-01
Payer: COMMERCIAL

## 2023-02-01 NOTE — TELEPHONE ENCOUNTER
RN attempted to call patient via [employer]  to relay ' message below.     Patient did not answer. Unable to leave message.       If patient calls back, please relay Dr. Loya' message.       Elsa Marie RN  [facility]

## 2023-02-01 NOTE — LETTER
February 7, 2023      Missy Vance  [address] [place] [address]        Dear ,    We attempted to call you but were not able to reach you. We are writing to inform you of your test results.    Per , Your kidney tests are continuing to worsen.  I am going to put in a referral for you to see a kidney doctor. There is too much protein in the urine, this is another sign of kidney problems.    -To protect your kidneys: Have good sugars (you do), have good blood pressure (you do), and have good cholesterol.  Your cholesterol is too high. Restart your cholesterol medicine and recheck your cholesterol in 6 weeks.    Your uric acid is high.  The uric acid is what causes gout.  This is because of the kidney problems.  You are not able to remove your uric acid naturally.  Please take the allopurinol.  We will recheck your uric acid next time you come in.    You still have prediabetes.  But it is okay.  No additional treatment is needed.    Your calcium, vitamin D, and iron levels are good now.  You are a little bit anemic.  That means that your red blood cells are low because the kidneys are not telling your body to produce enough red blood cells.  It is caused by your kidney problems.      Resulted Orders   UA reflex to Microscopic - lab collect   Result Value Ref Range    Color Urine Yellow Colorless, Straw, Light Yellow, Yellow    Appearance Urine Clear Clear    Glucose Urine Negative Negative mg/dL    Bilirubin Urine Negative Negative    Ketones Urine Negative Negative mg/dL    Specific Gravity Urine 1.015 1.005 - 1.030    Blood Urine Trace (A) Negative    pH Urine 5.0 5.0 - 8.0    Protein Albumin Urine 100 (A) Negative mg/dL    Urobilinogen Urine 0.2 0.2, 1.0 E.U./dL    Nitrite Urine Negative Negative    Leukocyte Esterase Urine Trace (A) Negative   Albumin Random Urine Quantitative with Creat Ratio   Result Value Ref Range    Albumin Urine mg/L 505.0 mg/L      Comment:      The reference ranges  have not been established in urine albumin. The results should be integrated into the clinical context for interpretation.    Albumin Urine mg/g Cr 911.55 (H) 0.00 - 25.00 mg/g Cr      Comment:      Microalbuminuria is defined as an albumin:creatinine ratio of 17 to 299 for males and 25 to 299 for females. A ratio of albumin:creatinine of 300 or higher is indicative of overt proteinuria.  Due to biologic variability, positive results should be confirmed by a second, first-morning random or 24-hour timed urine specimen. If there is discrepancy, a third specimen is recommended. When 2 out of 3 results are in the microalbuminuria range, this is evidence for incipient nephropathy and warrants increased efforts at glucose control, blood pressure control, and institution of therapy with an angiotensin-converting-enzyme (ACE) inhibitor (if the patient can tolerate it).      Creatinine Urine mg/dL 55.4 mg/dL      Comment:      The reference ranges have not been established in urine creatinine. The results should be integrated into the clinical context for interpretation.   Hemoglobin   Result Value Ref Range    Hemoglobin 11.2 (L) 11.7 - 15.7 g/dL   Comprehensive metabolic panel (BMP + Alb, Alk Phos, ALT, AST, Total. Bili, TP)   Result Value Ref Range    Sodium 138 136 - 145 mmol/L    Potassium 5.0 3.4 - 5.3 mmol/L    Chloride 107 98 - 107 mmol/L    Carbon Dioxide (CO2) 15 (L) 22 - 29 mmol/L    Anion Gap 16 (H) 7 - 15 mmol/L    Urea Nitrogen 52.4 (H) 6.0 - 20.0 mg/dL    Creatinine 2.52 (H) 0.51 - 0.95 mg/dL    Calcium 9.1 8.6 - 10.0 mg/dL    Glucose 92 70 - 99 mg/dL    Alkaline Phosphatase 97 35 - 104 U/L    AST 22 10 - 35 U/L    ALT 16 10 - 35 U/L    Protein Total 7.7 6.4 - 8.3 g/dL    Albumin 4.1 3.5 - 5.2 g/dL    Bilirubin Total 0.2 <=1.2 mg/dL    GFR Estimate 22 (L) >60 mL/min/1.73m2      Comment:      eGFR calculated using 2021 CKD-EPI equation.   Iron and iron binding capacity   Result Value Ref Range    Iron 66 37 -  145 ug/dL    Iron Binding Capacity 271 240 - 430 ug/dL    Iron Sat Index 24 15 - 46 %   Hemoglobin A1c   Result Value Ref Range    Hemoglobin A1C 5.9 (H) 0.0 - 5.6 %      Comment:      Normal <5.7%   Prediabetes 5.7-6.4%    Diabetes 6.5% or higher     Note: Adopted from ADA consensus guidelines.   Lipid panel reflex to direct LDL Fasting   Result Value Ref Range    Cholesterol 266 (H) <200 mg/dL    Triglycerides 89 <150 mg/dL    Direct Measure HDL 66 >=50 mg/dL    LDL Cholesterol Calculated 182 (H) <=100 mg/dL    Non HDL Cholesterol 200 (H) <130 mg/dL    Narrative    Cholesterol  Desirable:  <200 mg/dL    Triglycerides  Normal:  Less than 150 mg/dL  Borderline High:  150-199 mg/dL  High:  200-499 mg/dL  Very High:  Greater than or equal to 500 mg/dL    Direct Measure HDL  Female:  Greater than or equal to 50 mg/dL   Male:  Greater than or equal to 40 mg/dL    LDL Cholesterol  Desirable:  <100mg/dL  Above Desirable:  100-129 mg/dL   Borderline High:  130-159 mg/dL   High:  160-189 mg/dL   Very High:  >= 190 mg/dL    Non HDL Cholesterol  Desirable:  130 mg/dL  Above Desirable:  130-159 mg/dL  Borderline High:  160-189 mg/dL  High:  190-219 mg/dL  Very High:  Greater than or equal to 220 mg/dL   Magnesium   Result Value Ref Range    Magnesium 1.8 1.7 - 2.3 mg/dL   Phosphorus   Result Value Ref Range    Phosphorus 4.1 2.5 - 4.5 mg/dL   Vitamin D deficiency screening   Result Value Ref Range    Vitamin D, Total (25-Hydroxy) 55 20 - 75 ug/L    Narrative    Season, race, dietary intake, and treatment affect the concentration of 25-hydroxy-Vitamin D. Values may decrease during winter months and increase during summer months. Values 20-29 ug/L may indicate Vitamin D insufficiency and values <20 ug/L may indicate Vitamin D deficiency.    Vitamin D determination is routinely performed by an immunoassay specific for 25 hydroxyvitamin D3.  If an individual is on vitamin D2(ergocalciferol) supplementation, please specify 25 OH  vitamin D2 and D3 level determination by LCMSMS test VITD23.     Parathyroid Hormone Intact   Result Value Ref Range    Parathyroid Hormone Intact 53 15 - 65 pg/mL    Narrative    This result was obtained with the Roche Elecsys PTH STAT assay.   This reference range differs from PTH assays used in other [facility] laboratories.   Uric acid   Result Value Ref Range    Uric Acid 9.1 (H) 2.4 - 5.7 mg/dL   Urine Microscopic Exam   Result Value Ref Range    Bacteria Urine Few (A) None Seen /HPF    RBC Urine 0-2 0-2 /HPF /HPF    WBC Urine 0-5 0-5 /HPF /HPF    Squamous Epithelials Urine Few (A) None Seen /LPF       If you have any questions or concerns, please call the clinic at the number listed above.       Sincerely,            [facility]

## 2023-02-01 NOTE — RESULT ENCOUNTER NOTE
Please let Missy know:    Your kidney tests are continuing to worsen.  I am going to put in a referral for you to see a kidney doctor. There is too much protein in the urine, this is another sign of kidney problems.  -To protect your kidneys: Have good sugars (you do), have good blood pressure (you do), and have good cholesterol.  Your cholesterol is too high. Restart your cholesterol medicine and recheck your cholesterol in 6 weeks.    Your uric acid is high.  The uric acid is what causes gout.  This is because of the kidney problems.  You are not able to remove your uric acid naturally.  Please take the allopurinol.  We will recheck your uric acid next time you come in.    You still have prediabetes.  But it is okay.  No additional treatment is needed.    Your calcium, vitamin D, and iron levels are good now.  You are a little bit anemic.  That means that your red blood cells are low because the kidneys are not telling your body to produce enough red blood cells.  It is caused by your kidney problems.    Future Appointments  3/31/2023  8:00 AM    Myranda Loya MD         [facility]              [facility]

## 2023-02-01 NOTE — TELEPHONE ENCOUNTER
----- Message from Myranda Loya MD sent at 2/1/2023 11:49 AM [facility] -----  Please let Missy know:    Your kidney tests are continuing to worsen.  I am going to put in a referral for you to see a kidney doctor. There is too much protein in the urine, this is another sign of kidney problems.  -To protect your kidneys: Have good sugars (you do), have good blood pressure (you do), and have good cholesterol.  Your cholesterol is too high. Restart your cholesterol medicine and recheck your cholesterol in 6 weeks.    Your uric acid is high.  The uric acid is what causes gout.  This is because of the kidney problems.  You are not able to remove your uric acid naturally.  Please take the allopurinol.  We will recheck your uric acid next time you come in.    You still have prediabetes.  But it is okay.  No additional treatment is needed.    Your calcium, vitamin D, and iron levels are good now.  You are a little bit anemic.  That means that your red blood cells are low because the kidneys are not telling your body to produce enough red blood cells.  It is caused by your kidney problems.    Future Appointments  3/31/2023  8:00 AM    Myranda Loya MD         [facility]

## 2023-02-03 ENCOUNTER — APPOINTMENT (OUTPATIENT)
Dept: INTERPRETER SERVICES | Facility: CLINIC | Age: 58
End: 2023-02-03
Payer: COMMERCIAL

## 2023-02-03 NOTE — TELEPHONE ENCOUNTER
RN made 2nd attempt to call patient via [employer]  to relay ' message below.     Patient did not answer. Unable to leave message.       If patient calls back, please relay Dr. Loya' message.     Roslyn Montana RN  [facility]

## 2023-02-06 NOTE — LETTER
February 6, 2023  Re: Missy Vance  YOB: 1965    Dear Colleague,    Thank you for your referral to the [facility]. We have been unable to reach the patient after multiple contact attempts.      If you have any questions or concerns, please contact our office at Dept: [phone].        Sincerely,  [facility]

## 2023-02-07 NOTE — TELEPHONE ENCOUNTER
RN made 3rd attempt to call patient via [employer]  to relay ' message below.     Patient did not answer. Unable to leave message.       If patient calls back, please relay Dr. Loya' message.     Per protocol, a letter will be sent after 3 attempts.          Elsa Marie RN  [facility]

## 2023-02-15 NOTE — PROGRESS NOTES
1st attempt, LM for pt to call back to help assist them in scheduling. Will call back in a few days.

## 2023-03-31 ENCOUNTER — OFFICE VISIT (OUTPATIENT)
Dept: FAMILY MEDICINE | Facility: CLINIC | Age: 58
End: 2023-03-31
Payer: COMMERCIAL

## 2023-03-31 VITALS
DIASTOLIC BLOOD PRESSURE: 64 MMHG | SYSTOLIC BLOOD PRESSURE: 120 MMHG | RESPIRATION RATE: 14 BRPM | TEMPERATURE: 97.5 F | WEIGHT: 144 LBS | OXYGEN SATURATION: 99 % | HEART RATE: 61 BPM | BODY MASS INDEX: 29.03 KG/M2 | HEIGHT: 59 IN

## 2023-03-31 DIAGNOSIS — Z12.31 VISIT FOR SCREENING MAMMOGRAM: ICD-10-CM

## 2023-03-31 DIAGNOSIS — E78.5 HYPERLIPIDEMIA LDL GOAL <130: ICD-10-CM

## 2023-03-31 DIAGNOSIS — K64.4 EXTERNAL HEMORRHOIDS: ICD-10-CM

## 2023-03-31 DIAGNOSIS — N18.4 CHRONIC KIDNEY DISEASE, STAGE 4 (SEVERE) (H): ICD-10-CM

## 2023-03-31 DIAGNOSIS — Z00.00 ROUTINE GENERAL MEDICAL EXAMINATION AT A HEALTH CARE FACILITY: Primary | ICD-10-CM

## 2023-03-31 LAB
ANION GAP SERPL CALCULATED.3IONS-SCNC: 15 MMOL/L (ref 7–15)
BUN SERPL-MCNC: 59.3 MG/DL (ref 6–20)
CALCIUM SERPL-MCNC: 9.5 MG/DL (ref 8.6–10)
CHLORIDE SERPL-SCNC: 107 MMOL/L (ref 98–107)
CREAT SERPL-MCNC: 2.62 MG/DL (ref 0.51–0.95)
CREAT UR-MCNC: 38.5 MG/DL
DEPRECATED HCO3 PLAS-SCNC: 19 MMOL/L (ref 22–29)
GFR SERPL CREATININE-BSD FRML MDRD: 21 ML/MIN/1.73M2
GLUCOSE SERPL-MCNC: 92 MG/DL (ref 70–99)
LDLC SERPL DIRECT ASSAY-MCNC: 209 MG/DL
MICROALBUMIN UR-MCNC: 498 MG/L
MICROALBUMIN/CREAT UR: 1293.51 MG/G CR (ref 0–25)
POTASSIUM SERPL-SCNC: 5.3 MMOL/L (ref 3.4–5.3)
SODIUM SERPL-SCNC: 141 MMOL/L (ref 136–145)
URATE SERPL-MCNC: 8.9 MG/DL (ref 2.4–5.7)

## 2023-03-31 PROCEDURE — 99396 PREV VISIT EST AGE 40-64: CPT | Performed by: FAMILY MEDICINE

## 2023-03-31 PROCEDURE — 82043 UR ALBUMIN QUANTITATIVE: CPT | Performed by: FAMILY MEDICINE

## 2023-03-31 PROCEDURE — 83721 ASSAY OF BLOOD LIPOPROTEIN: CPT | Performed by: FAMILY MEDICINE

## 2023-03-31 PROCEDURE — 82570 ASSAY OF URINE CREATININE: CPT | Performed by: FAMILY MEDICINE

## 2023-03-31 PROCEDURE — 80048 BASIC METABOLIC PNL TOTAL CA: CPT | Performed by: FAMILY MEDICINE

## 2023-03-31 PROCEDURE — 36415 COLL VENOUS BLD VENIPUNCTURE: CPT | Performed by: FAMILY MEDICINE

## 2023-03-31 PROCEDURE — 84550 ASSAY OF BLOOD/URIC ACID: CPT | Performed by: FAMILY MEDICINE

## 2023-03-31 RX ORDER — AMLODIPINE AND BENAZEPRIL HYDROCHLORIDE 5; 20 MG/1; MG/1
1 CAPSULE ORAL DAILY
Qty: 90 CAPSULE | Refills: 3 | Status: SHIPPED | OUTPATIENT
Start: 2023-03-31 | End: 2024-05-22

## 2023-03-31 RX ORDER — HYDROCORTISONE 25 MG/G
CREAM TOPICAL 2 TIMES DAILY PRN
Qty: 30 G | Refills: 4 | Status: SHIPPED | OUTPATIENT
Start: 2023-03-31 | End: 2023-11-09

## 2023-03-31 RX ORDER — SODIUM BICARBONATE 650 MG/1
650 TABLET ORAL 3 TIMES DAILY
Qty: 360 TABLET | Refills: 4 | Status: SHIPPED | OUTPATIENT
Start: 2023-03-31 | End: 2024-09-12

## 2023-03-31 RX ORDER — ATORVASTATIN CALCIUM 40 MG/1
40 TABLET, FILM COATED ORAL AT BEDTIME
Qty: 90 TABLET | Refills: 4 | Status: SHIPPED | OUTPATIENT
Start: 2023-03-31 | End: 2023-11-09

## 2023-03-31 RX ORDER — ALLOPURINOL 300 MG/1
300 TABLET ORAL DAILY
Qty: 90 TABLET | Refills: 3 | Status: SHIPPED | OUTPATIENT
Start: 2023-03-31 | End: 2023-05-26 | Stop reason: DRUGHIGH

## 2023-03-31 ASSESSMENT — ENCOUNTER SYMPTOMS
ARTHRALGIAS: 0
DIARRHEA: 0
HEMATOCHEZIA: 0
CHILLS: 0
COUGH: 0
EYE PAIN: 0
HEARTBURN: 0
FEVER: 0
FREQUENCY: 0
HEADACHES: 0
SHORTNESS OF BREATH: 0
DIZZINESS: 0
ABDOMINAL PAIN: 0
MYALGIAS: 1
CONSTIPATION: 0
DYSURIA: 0
PARESTHESIAS: 1
NAUSEA: 0
JOINT SWELLING: 0
HEMATURIA: 0
WEAKNESS: 0
NERVOUS/ANXIOUS: 0
SORE THROAT: 0
BREAST MASS: 0
PALPITATIONS: 0

## 2023-03-31 NOTE — PROGRESS NOTES
SUBJECTIVE:   CC: Missy is an 57 year old who presents for preventive health visit.   Additional Questions 3/31/2023   Roomed by taran   Accompanied by -   Patient has been advised of split billing requirements and indicates understanding: Yes  Healthy Habits:     Getting at least 3 servings of Calcium per day:  Yes    Bi-annual eye exam:  Yes    Dental care twice a year:  NO    Sleep apnea or symptoms of sleep apnea:  None    Diet:  Regular (no restrictions)    Frequency of exercise:  4-5 days/week    Duration of exercise:  Greater than 60 minutes    Taking medications regularly:  Yes    Medication side effects:  None    PHQ-2 Total Score: 0    Additional concerns today:  No    Going to the gym three times a week. Does machines and ropes.     Today's PHQ-2 Score:   PHQ-2 ( 1999 [employer]) 3/31/2023   Q1: Little interest or pleasure in doing things 0   Q2: Feeling down, depressed or hopeless 0   PHQ-2 Score 0   PHQ-2 Total Score (12-17 Years)- Positive if 3 or more points; Administer PHQ-A if positive -   Q1: Little interest or pleasure in doing things Not at all   Q2: Feeling down, depressed or hopeless Not at all   PHQ-2 Score 0     Social History     Tobacco Use     Smoking status: Never     Passive exposure: Never     Smokeless tobacco: Never     Tobacco comments:     no passive exposure   Substance Use Topics     Alcohol use: No     Alcohol Use 3/31/2023   Prescreen: >3 drinks/day or >7 drinks/week? No   No flowsheet data found.  Reviewed orders with patient.  Reviewed health maintenance and updated orders accordingly - Yes    Breast Cancer Screening:  Breast CA Risk Assessment (FHS-7) 3/31/2023   Do you have a family history of breast, colon, or ovarian cancer? No / Unknown   Pertinent mammograms are reviewed under the imaging tab.  History of abnormal Pap smear: NO - age 30-65 PAP every 5 years with negative HPV co-testing recommended  PAP / HPV Latest Ref Rng & Units 1/17/2020   PAP Negative for squamous  "intraepithelial lesion or malignancy. Negative for squamous intraepithelial lesion or malignancy  Electronically signed by Zita Robles CT (ASCP) on 1/24/2020 at  2:27 PM     HPV16 NEG Negative   HPV18 NEG Negative   HRHPV NEG Negative     Reviewed and updated as needed this visit by clinical staff   Tobacco  Allergies  Meds  Problems  Med Hx  Surg Hx  Fam Hx          Reviewed and updated as needed this visit by Provider   Tobacco  Allergies  Meds  Problems  Med Hx  Surg Hx  Fam Hx         Review of Systems   Constitutional: Negative for chills and fever.   HENT: Negative for congestion, ear pain, hearing loss and sore throat.    Eyes: Negative for pain and visual disturbance.   Respiratory: Negative for cough and shortness of breath.    Cardiovascular: Negative for chest pain, palpitations and peripheral edema.   Gastrointestinal: Negative for abdominal pain, constipation, diarrhea, heartburn, hematochezia and nausea.   Breasts:  Negative for tenderness, breast mass and discharge.   Genitourinary: Negative for dysuria, frequency, genital sores, hematuria, pelvic pain, urgency, vaginal bleeding and vaginal discharge.   Musculoskeletal: Positive for myalgias. Negative for arthralgias and joint swelling.   Skin: Negative for rash.   Neurological: Positive for paresthesias. Negative for dizziness, weakness and headaches.   Psychiatric/Behavioral: Negative for mood changes. The patient is not nervous/anxious.       OBJECTIVE:   /64 (BP Location: Right arm, Patient Position: Sitting, Cuff Size: Adult Regular)   Pulse 61   Temp 97.5  F (36.4  C) (Temporal)   Resp 14   Ht 1.5 m (4' 11.06\")   Wt 65.3 kg (144 lb)   SpO2 99%   BMI 29.03 kg/m    Physical Exam  GENERAL APPEARANCE: healthy, alert and no distress  EYES: Eyes grossly normal to inspection, PERRL and conjunctivae and sclerae normal  HENT: ear canals and TM's normal, nose and mouth without ulcers or lesions, oropharynx clear and oral " mucous membranes moist  NECK: no adenopathy, no asymmetry, masses, or scars and thyroid normal to palpation  RESP: lungs clear to auscultation - no rales, rhonchi or wheezes  BREAST: normal without masses, tenderness or nipple discharge and no palpable axillary masses or adenopathy  CV: regular rate and rhythm, normal S1 S2, no S3 or S4, no murmur, click or rub, no peripheral edema and peripheral pulses strong  ABDOMEN: soft, nontender, no hepatosplenomegaly, no masses and bowel sounds normal  MS: no musculoskeletal defects are noted and gait is age appropriate without ataxia  SKIN: no suspicious lesions or rashes  NEURO: Normal strength and tone, sensory exam grossly normal, mentation intact and speech normal  PSYCH: mentation appears normal and affect normal/bright    Diagnostic Test Results:  Labs reviewed in Epic    ASSESSMENT/PLAN:   Missy was seen today for physical.    Diagnoses and all orders for this visit:    Routine general medical examination at a health care facility    Chronic kidney disease, stage 4 (severe) (H)  -     amLODIPine-benazepril (LOTREL) 5-20 MG capsule; Take 1 capsule by mouth daily  -     allopurinol (ZYLOPRIM) 300 MG tablet; Take 1 tablet (300 mg) by mouth daily  -     BASIC METABOLIC PANEL; Future  -     Albumin Random Urine Quantitative with Creat Ratio; Future  -     sodium bicarbonate 650 MG tablet; Take 1 tablet (650 mg) by mouth 3 times daily  -     Uric acid; Future  -     BASIC METABOLIC PANEL  -     Uric acid  -     Albumin Random Urine Quantitative with Creat Ratio    Hyperlipidemia LDL goal <130  -     atorvastatin (LIPITOR) 40 MG tablet; Take 1 tablet (40 mg) by mouth At Bedtime  -     LDL cholesterol direct; Future  -     LDL cholesterol direct    External hemorrhoids  -     witch hazel-glycerin (TUCKS) pad; Apply topically as needed for hemorrhoids  -     hydrocortisone, Perianal, (HYDROCORTISONE) 2.5 % cream; Place rectally 2 times daily as needed for  "hemorrhoids    Visit for screening mammogram  -     *MA Screening Digital Bilateral; Future    Other orders  -     Cancel: INFLUENZA VACCINE 50-64 OR 18-64 W/EGG ALLERGY (FLUBLOK) - Missy declines.      Patient has been advised of split billing requirements and indicates understanding: Yes    COUNSELING:  Reviewed preventive health counseling, as reflected in patient instructions    BMI:   Estimated body mass index is 29.03 kg/m  as calculated from the following:    Height as of this encounter: 1.5 m (4' 11.06\").    Weight as of this encounter: 65.3 kg (144 lb).   Weight management plan: Discussed healthy diet and exercise guidelines    She reports that she has never smoked. She has never been exposed to tobacco smoke. She has never used smokeless tobacco.    Myranda Loya MD  [facility]  "

## 2023-04-19 ENCOUNTER — APPOINTMENT (OUTPATIENT)
Dept: INTERPRETER SERVICES | Facility: CLINIC | Age: 58
End: 2023-04-19
Payer: COMMERCIAL

## 2023-04-19 ENCOUNTER — TELEPHONE (OUTPATIENT)
Dept: FAMILY MEDICINE | Facility: CLINIC | Age: 58
End: 2023-04-19
Payer: COMMERCIAL

## 2023-04-19 NOTE — TELEPHONE ENCOUNTER
Contacted patient using a [employer]  and relayed message below from Dr Loya.    Message sent to scheduling pool to help schedule Nephrology and Dr Loya appointments. Thanks    Roslyn Montana RN  [facility]      ----- Message from Myranda Loya MD sent at 4/18/2023  4:15 PM CDT -----  Please touch base with Missy.  Her kidney tests are worsening and I would like her to do everything we can to delay dialysis.  Does she need help scheduling a nephrology appointment?  Does she have all of her medications?  Please schedule a follow-up appointment with me toward the end of May.  Your kidney function continues to worsen.  Please follow-up with nephrology.    Your LDL cholesterol is very high.  You should be on atorvastatin 40 mg daily.  Please resume this because it is really important for helping to keep your kidneys as healthy as we can.   Written by Myranda Loya MD on 4/18/2023  4:13 PM CDT     Refill passed per [address] protocol.   Requested Prescriptions   Pending Prescriptions Disp Refills    LOSARTAN 50 MG Oral Tab [Pharmacy Med Name: LOSARTAN 50MG TABLETS] 90 tablet 0     Sig: TAKE 1 TABLET(50 MG) BY MOUTH DAILY        Hypertensive Medications Protocol Passed - 5/15/2022  5:09 PM        Passed - CMP or BMP in past 12 months        Passed - Appointment in past 6 or next 3 months        Passed - GFR Non- > 50     Lab Results   Component Value Date    GFRNAA 87 12/04/2021                      Recent Outpatient Visits              5 months ago Annual physical exam    Hong Thomas MD    Office Visit    10 months ago [facility] health care    [address], [address], [place]    Nurse Only    1 year ago Type 2 diabetes mellitus with microalbuminuria, without long-term current use of insulin [facility])    Hong Thomas MD    Office Visit    1 year ago Annual physical exam    Lexie Greer MD    Office Visit    1 year ago ASHD (arteriosclerotic heart disease)    [address], [address], Hong Gutierrez MD    [employer] [facility]

## 2023-04-19 NOTE — TELEPHONE ENCOUNTER
Called and spoke with patient. Patient is scheduled with [facility] on Friday April 21st and with Dr. Loya on May 26. No further action required. Completing tasks.

## 2023-04-21 ENCOUNTER — TRANSFERRED RECORDS (OUTPATIENT)
Dept: HEALTH INFORMATION MANAGEMENT | Facility: CLINIC | Age: 58
End: 2023-04-21

## 2023-04-21 LAB
CREATININE (EXTERNAL): 2.59 MG/DL (ref 0.5–1.03)
GFR ESTIMATED (EXTERNAL): 21 ML/MIN/1.73M2
GLUCOSE (EXTERNAL): 93 MG/DL (ref 65–99)
POTASSIUM (EXTERNAL): 4.9 MMOL/L (ref 3.5–5.3)

## 2023-05-26 ENCOUNTER — OFFICE VISIT (OUTPATIENT)
Dept: FAMILY MEDICINE | Facility: CLINIC | Age: 58
End: 2023-05-26
Payer: COMMERCIAL

## 2023-05-26 VITALS
HEIGHT: 59 IN | DIASTOLIC BLOOD PRESSURE: 79 MMHG | WEIGHT: 142 LBS | BODY MASS INDEX: 28.63 KG/M2 | RESPIRATION RATE: 16 BRPM | TEMPERATURE: 97.5 F | HEART RATE: 68 BPM | SYSTOLIC BLOOD PRESSURE: 124 MMHG | OXYGEN SATURATION: 100 %

## 2023-05-26 DIAGNOSIS — I10 HYPERTENSION GOAL BP (BLOOD PRESSURE) < 130/80: ICD-10-CM

## 2023-05-26 DIAGNOSIS — N18.4 CHRONIC KIDNEY DISEASE, STAGE 4 (SEVERE) (H): ICD-10-CM

## 2023-05-26 DIAGNOSIS — M79.10 MYALGIA: Primary | ICD-10-CM

## 2023-05-26 DIAGNOSIS — E79.0 HYPERURICEMIA: ICD-10-CM

## 2023-05-26 DIAGNOSIS — E78.5 HYPERLIPIDEMIA LDL GOAL <130: ICD-10-CM

## 2023-05-26 LAB
CK SERPL-CCNC: 105 U/L (ref 26–192)
HGB BLD-MCNC: 11.4 G/DL (ref 11.7–15.7)

## 2023-05-26 PROCEDURE — 36415 COLL VENOUS BLD VENIPUNCTURE: CPT | Performed by: FAMILY MEDICINE

## 2023-05-26 PROCEDURE — 90677 PCV20 VACCINE IM: CPT | Performed by: FAMILY MEDICINE

## 2023-05-26 PROCEDURE — 82550 ASSAY OF CK (CPK): CPT | Performed by: FAMILY MEDICINE

## 2023-05-26 PROCEDURE — 90471 IMMUNIZATION ADMIN: CPT | Performed by: FAMILY MEDICINE

## 2023-05-26 PROCEDURE — 85018 HEMOGLOBIN: CPT | Performed by: FAMILY MEDICINE

## 2023-05-26 PROCEDURE — 99214 OFFICE O/P EST MOD 30 MIN: CPT | Mod: 25 | Performed by: FAMILY MEDICINE

## 2023-05-26 RX ORDER — ALLOPURINOL 100 MG/1
100 TABLET ORAL DAILY
Start: 2023-05-26 | End: 2024-03-06

## 2023-05-26 RX ORDER — ALLOPURINOL 100 MG/1
TABLET ORAL
COMMUNITY
Start: 2023-04-21 | End: 2023-05-26

## 2023-05-26 RX ORDER — PRAVASTATIN SODIUM 10 MG
10 TABLET ORAL DAILY
Qty: 90 TABLET | Refills: 4 | Status: SHIPPED | OUTPATIENT
Start: 2023-05-26 | End: 2024-03-06 | Stop reason: ALTCHOICE

## 2023-05-26 ASSESSMENT — PAIN SCALES - GENERAL: PAINLEVEL: SEVERE PAIN (6)

## 2023-05-26 NOTE — PROGRESS NOTES
"Office Visit  [facility]  Date of Service: May 26, 2023    Subjective     Chief Complaint   Patient presents with     Recheck Medication     Myalgia  Started meds 4/22/23: sodium bicarb, atorvastatin  Burning in back turned into aching in the back, shoulders, arms and legs.  Feels like kidneys will explode, hard to walk.   Wasn't sure which med was causing it. Reduced sodium bicarb to once a day first. Stopped atorvastatin 2 weeks ago.   Feeling better, but still having pain.  No gout flares since staring allpurinol. Reduced dose to 100 mg and still doing well.   Has noticed more bubbles in her urine.     Objective   /79 (BP Location: Left arm, Patient Position: Sitting, Cuff Size: Adult Regular)   Pulse 68   Temp 97.5  F (36.4  C) (Temporal)   Resp 16   Ht 1.5 m (4' 11.06\")   Wt 64.4 kg (142 lb)   SpO2 100%   BMI 28.63 kg/m   She reports that she has never smoked. She has never been exposed to tobacco smoke. She has never used smokeless tobacco.  Wt Readings from Last 3 Encounters:   05/26/23 64.4 kg (142 lb)   03/31/23 65.3 kg (144 lb)   01/23/23 64.4 kg (142 lb)     BP Readings from Last 6 Encounters:   05/26/23 137/79   03/31/23 120/64   01/23/23 104/68   04/07/22 127/80   08/06/21 118/73   06/14/21 125/80         Gen: Alert, no apparent distress.    No results found for any visits on 05/26/23.  Assessment & Plan     1. Myalgia.  Likely represents a statin myopathy.  Check CK.  Discontinue atorvastatin until muscle pain resolves.  Then start pravastatin 10 mg daily and continue if tolerated.  LDL was quite high, greater than 200.  Discussed dietary modifications.  Discussed the benefits of statin for both cholesterol-lowering and plaque stabilization.  2. Hypertension.  Well-controlled.  3. Hyperlipidemia.  As above, stopping statin due to myalgias.  Discussed diet.  We will attempt to resume pravastatin in the near future.  4. Chronic kidney disease, stage " IV.  Encouraged continued use of sodium bicarbonate 3 times daily.  I do not think is contributing to her symptoms at this time.  5. Bubbly urine.  I think this is due to her proteinuria.  Discussed ongoing kidney care.  6. Health maintenance: Due for mammogram.  Previously ordered.  We will get that scheduled.  Next health maintenance exam due in March.    Order Summary                                                      Myalgia  -     CK total; Future  -     pravastatin (PRAVACHOL) 10 MG tablet; Take 1 tablet (10 mg) by mouth daily (replaces atorvastatin).            Future Appointments   Date Time Provider Department Center   5/26/2023  9:40 AM Myranda Loya MD [facility]       Completed by: Myranda Loya M.D., [facility]. 5/26/2023 9:21 AM. MDM: DARIUS neighborhood: [place] [address], language: [place],     Answers for HPI/ROS submitted by the patient on 5/26/2023  Do you take any over the counter pain medicine?  : No  How many servings of fruits and vegetables do you eat daily?: 4 or more  On average, how many sweetened beverages do you drink each day (Examples: soda, juice, sweet tea, etc.  Do NOT count diet or artificially sweetened beverages)?: 0  How many minutes a day do you exercise enough to make your heart beat faster?: 60 or more  How many days a week do you exercise enough to make your heart beat faster?: 3 or less  How many days per week do you miss taking your medication?: 0

## 2023-05-26 NOTE — PATIENT INSTRUCTIONS
Muscle Pains  Stop atorvastatin  When pain goes away, start pravastatin    To improve your cholesterol:  Limit red meat, fatty dairy (like whole milk, butter, and ice cream), and fried food.  Limit sugary foods and beverages (like juice, pop and sports drinks).  Eat more fruits, vegetables, whole grain (like brown rice, whole wheat bread, and oatmeal), fish and nuts.

## 2023-06-06 NOTE — PROGRESS NOTES
Pt refuse scheduling at this time and states that she'll cb to schedule herself when ready to get it done. Completing task.

## 2023-07-21 ENCOUNTER — PATIENT OUTREACH (OUTPATIENT)
Dept: CARE COORDINATION | Facility: CLINIC | Age: 58
End: 2023-07-21
Payer: COMMERCIAL

## 2023-08-18 ENCOUNTER — PATIENT OUTREACH (OUTPATIENT)
Dept: CARE COORDINATION | Facility: CLINIC | Age: 58
End: 2023-08-18
Payer: COMMERCIAL

## 2023-11-07 ENCOUNTER — TELEPHONE (OUTPATIENT)
Dept: FAMILY MEDICINE | Facility: CLINIC | Age: 58
End: 2023-11-07
Payer: COMMERCIAL

## 2023-11-07 NOTE — TELEPHONE ENCOUNTER
Please clarify. Her  needs more PCA hours, correct?  This request needs to go in his chart, the.  She would need FMLA for time off work, not PCA hours for her.

## 2023-11-07 NOTE — TELEPHONE ENCOUNTER
Forms/Letter Request    Type of form/letter: Needs new letter for PCA hours    Have you been seen for this request: No    Do we have the form/letter: No    Who is the form from? Patient needs a letter stating she needs more PCA hours because her  fell in August and she has to be at home with him to take care of him because he will faint randomly. (if other please explain)    When is form/letter needed by: ASAP    How would you like the form/letter returned: Mail  Is this the correct address?: Yes  [address] [place] [address]    Patient Notified form requests are processed in 3-5 business days:Yes    Could we send this information to you in [employer] or would you prefer to receive a phone call?:   Patient would prefer a phone call   Okay to leave a detailed message?: Yes at Cell number on file:    Telephone Information:   Mobile [phone]

## 2023-11-09 ENCOUNTER — OFFICE VISIT (OUTPATIENT)
Dept: INTERNAL MEDICINE | Facility: CLINIC | Age: 58
End: 2023-11-09
Payer: COMMERCIAL

## 2023-11-09 VITALS
HEART RATE: 64 BPM | WEIGHT: 142.8 LBS | DIASTOLIC BLOOD PRESSURE: 70 MMHG | BODY MASS INDEX: 28.79 KG/M2 | HEIGHT: 59 IN | SYSTOLIC BLOOD PRESSURE: 121 MMHG | RESPIRATION RATE: 16 BRPM | OXYGEN SATURATION: 99 %

## 2023-11-09 DIAGNOSIS — K64.4 EXTERNAL HEMORRHOIDS: Primary | ICD-10-CM

## 2023-11-09 PROCEDURE — 99203 OFFICE O/P NEW LOW 30 MIN: CPT | Performed by: INTERNAL MEDICINE

## 2023-11-09 RX ORDER — HYDROCORTISONE 25 MG/G
CREAM TOPICAL 2 TIMES DAILY PRN
Qty: 30 G | Refills: 4 | Status: SHIPPED | OUTPATIENT
Start: 2023-11-09

## 2023-11-09 NOTE — TELEPHONE ENCOUNTER
Left voicemail for patient to return call to clinic with [facility]  [place]. If patient calls back, please clarify request per message from Dr. Lyoa:    Please clarify. Her  needs more PCA hours, correct?  This request needs to go in his chart, the.  She would need FMLA for time off work, not PCA hours for her.

## 2023-11-09 NOTE — PROGRESS NOTES
"  Assessment & Plan     External hemorrhoids  59 yo female is here today with her son, who served as an interpretor.    She has palpable tender and itchy anal nodules for about 11 days, hurts more with bowel movements, no bleeding or discharge.  She has h/o external hemorrhoids.  On the exam, there is no redness, bleeding. She has bulging external hemorrhoids, small and not inflamed.    Home care discussed and Rx sent for hydrocortisone and Tucks.  Referral for colorectal clinic placed if no improvement.  - witch hazel-glycerin (TUCKS) pad; Apply topically as needed for hemorrhoids  - hydrocortisone, Perianal, (HYDROCORTISONE) 2.5 % cream; Place rectally 2 times daily as needed for hemorrhoids  - Adult Colorectal Surgery  Referral; Future             BMI:   Estimated body mass index is 28.78 kg/m  as calculated from the following:    Height as of this encounter: 1.5 m (4' 11.06\").    Weight as of this encounter: 64.8 kg (142 lb 12.8 oz).       Return in about 4 weeks (around 12/7/2023) for Follow up.     Radha Blanco MD  [facility]    Carolynn Louis is a 58 year old, presenting for the following health issues:  Pimple On Bottom  (Pimple On Bottom x10-11 Day's-Inside Cheeks )      11/9/2023     3:42 PM   Additional Questions   Roomed by Rubina       HPI               Review of Systems         Objective    /70   Pulse 64   Resp 16   Ht 1.5 m (4' 11.06\")   Wt 64.8 kg (142 lb 12.8 oz)   LMP  (LMP Unknown)   SpO2 99%   BMI 28.78 kg/m    Body mass index is 28.78 kg/m .  Physical Exam                         Answers submitted by the patient for this visit:  General Questionnaire (Submitted on 11/9/2023)  Chief Complaint: Chronic problems general questions HPI Form  How many servings of fruits and vegetables do you eat daily?: 4 or more  On average, how many sweetened beverages do you drink each day (Examples: soda, juice, sweet tea, etc.  Do NOT count diet or " artificially sweetened beverages)?: 0  How many minutes a day do you exercise enough to make your heart beat faster?: 10 to 19  How many days a week do you exercise enough to make your heart beat faster?: 3 or less  How many days per week do you miss taking your medication?: 0  General Concern (Submitted on 11/9/2023)  Chief Complaint: Chronic problems general questions HPI Form  What is the reason for your visit today?: zit inside the rectum  When did your symptoms begin?: 1-2 weeks ago  What are your symptoms?: pain in the butt  How would you describe these symptoms?: Severe  Are your symptoms:: Improving  Have you had these symptoms before?: No  Is there anything that makes you feel worse?: sitting for long periods  Is there anything that makes you feel better?: lying flat on side or back

## 2023-11-09 NOTE — COMMUNITY RESOURCES LIST (ENGLISH)
11/09/2023   [facility] - Outpatient Clinics  N/A  For additional resource needs, please contact your health insurance member services or your primary care team.  Phone: [phone]   Email: N/A   Address: [address] [place] [id number]   Hours: N/A        Food and Nutrition       Food pantry  1  [facility] - Food Distribution Program Distance: 1.17 miles      In-Person   [address] [place] [address]  Language: English, Hmong, Andorran  Hours: Tue 3:00 PM - 5:00 PM  Fees: Free   Phone: (381) 429-4825 Email: info@[facility].org Website: http://[facility].org/programs/odqxpy-vzmnvuvvw-urzhcx/     2  [facility] Distance: 1.77 miles      [facility]   [address] [place] [address]  Language: American Sign Language, English, Hmong, Chilean, Andorran  Hours: Mon - Fri 12:00 PM - 1:00 PM  Fees: Free   Phone: (467) 449-6108 Email: info@[place].org Website: https://www.[facility].org/locations/[facility]ca?utm_source=DeepRockDrive&utm_medium=local&utm_campaign=local%20search     SNAP application assistance  3  [employer] [place] Distance: 3.33 miles      Phone/Virtual   [address] [address]  Language: English, Hmong, Turkish, Chilean, Andorran  Hours: Mon - Fri 8:30 AM - 4:30 PM  Fees: Free   Phone: (168) 978-2876 Email: helpline@[employer].org Website: https://www.[employer].org/programs/[place]-food-helpline/     4  [facility] Distance: 2.16 miles      Phone/Virtual   [address] [place] [address]  Language: English  Hours: Mon - Thu 9:00 AM - 4:00 PM , Fri 9:00 AM - 2:00 PM , Sun 10:00 AM - 12:00 PM  Fees: Free   Phone: (354) 184-8840 Email: keith@[facility].org Website: http://[facility].org/community/od-sjje-ppnql-ave/     [employer] kitchen or free meals  5  [facility]  Distance: 2.16 miles      Pickup   [address] [place] [address]  Language: English  Hours: Mon - Fri 11:45 AM - 12:45 PM  Fees: Free   Phone: (924) 244-7149 Email: keith@[facility]ly[facility], S[facility]naLikeBetter.com.org Website: http://[facility].org/community/au-bouj-zbuac-mitul/     6  [facility] [employer] Loaves and Fishes Distance: 2.5 miles      Pickup   [address] [address]  Language: English  Hours: Wed 5:30 PM - 6:30 PM  Fees: Free   Phone: (107) 499-3782 Email: office@ormn.org Website: https://www.[employer]mn.org          Important Numbers & Websites       [facility]   [address].org  Poison Control   (325) 341-7816 [place]  Suicide and Crisis Lifeline   [address] [place], [place] [address]line.org  Childhelp [place] Child Abuse Hotline   957.541.3072 Childhelphotline.org  [place] Sexual Assault Hotline   (999) 274-2919 (HOPE) [facility].org  National Runaway Safeline   (233) 671-9760 (RUNAWAY) [facility] Hospital[facility].org  Pregnancy & Postpartum Support [place]   Call/text 761-818-1056 Ppsupport[place].org  Substance Abuse National Helpline ([facility]   090-840-HELP (7908) Findtreatment.gov  Emergency Services   911

## 2023-11-09 NOTE — COMMUNITY RESOURCES LIST (ENGLISH)
11/09/2023   [facility]  N/A  For questions about this resource list or additional care needs, please contact your primary care clinic or care manager.  Phone: [phone]   Email: N/A   Address: [address] [place] [address]   Hours: N/A        Food and Nutrition       Food pantry  1  [facility] - Food Distribution Program Distance: 1.17 miles      In-Person   [address] [place] [address]  Language: English, Hmong, Jordanian  Hours: Tue 3:00 PM - 5:00 PM  Fees: Free   Phone: (449) 215-7488 Email: info@[facility].Thumbtack Website: http://[facility].org/programs/dlxmkk-mcjemupub-zxucmy/     2  [facility] Distance: 1.77 miles      [facility]   [address] [place] [address]  Language: American Sign Language, English, Hmong, British, Jordanian  Hours: Mon - Fri 12:00 PM - 1:00 PM  Fees: Free   Phone: (395) 304-7155 Email: info@[employer].Thumbtack Website: https://www.[facility].org/locations/[facility]_ca?utm_source=Connoshoer&utm_medium=local&utm_campaign=local%20search     SNAP application assistance  3  [facility] Distance: 2.16 miles      Phone/Virtual   [address] [place] [address]  Language: English  Hours: Mon - Thu 9:00 AM - 4:00 PM , Fri 9:00 AM - 2:00 PM , Sun 10:00 AM - 12:00 PM  Fees: Free   Phone: (417) 994-6861 Email: keith@[facility].org Website: http://[facility].org/Community Health/[facility]/     [address] [place] [address] Outreach Distance: 2.61 miles      Phone/Virtual   [address] [place] [address]  Language: French, English, Hmong, Angella, Jordanian  Hours: Mon - Fri 10:00 AM - 12:00 PM , Mon - Fri 2:00 PM - 4:00 PM  Fees: Free   Phone: (645) 494-4033 Website: https://[employer].org/     Soup kitchen or free meals  5  [facility] Distance: 2.16 miles       Pickup   [address] [place] [address]  Language: English  Hours: Mon - Fri 11:45 AM - 12:45 PM  Fees: Free   Phone: (472) 594-7593 Email: keith@[facility].org Website: http://[facility].org/Pending sale to [facility]/yp-ocdi-vsmvd-mitul/     6  [facility] [place] [place] Distance: 2.5 miles      Pickup   [address] [place] [address]  Language: English  Hours: Wed 5:30 PM - 6:30 PM  Fees: Free   Phone: (902) 770-8698 Email: office@[facility].org Website: https://www.[facility].org          Important Numbers & Websites       Emergency Services   911  [facility]   311  Poison Control   (441) 157-9878  Suicide Prevention Lifeline   (942) 357-6588 (TALK)  Child Abuse Hotline   (651) 877-6586 (4-A-Child)  Sexual Assault Hotline   (736) 620-7764 (HOPE)  National Runaway Safeline   (427) 612-9877 (RUNAWAY)  All-Options Talkline   (651) 554-6493  Substance Abuse Referral   (806) 732-8867 (HELP)

## 2023-11-14 ENCOUNTER — APPOINTMENT (OUTPATIENT)
Dept: INTERPRETER SERVICES | Facility: CLINIC | Age: 58
End: 2023-11-14
Payer: COMMERCIAL

## 2023-11-14 NOTE — TELEPHONE ENCOUNTER
"Writer attempt #2 to call patient with the help of a \"Hmong\"  regarding provider's message below. No answer, left non-detailed voicemail, with clinic call back number.     If patient returns call back, please obtain more information per Dr. Loya' message below. Thanks.    NU JimenezN, RN   [facility]    "

## 2023-11-14 NOTE — TELEPHONE ENCOUNTER
Patient and Patient's son Gold  returns call. Writer relay RN/provider's message below. Caller verbalizes understanding and has no further questions. Caller stated its for his dad. Caller did state as well that a mychart message was sent from his dad's mychart as well about it. Closing this encounter since its for patient's .

## 2023-11-29 ENCOUNTER — TELEPHONE (OUTPATIENT)
Dept: FAMILY MEDICINE | Facility: CLINIC | Age: 58
End: 2023-11-29
Payer: COMMERCIAL

## 2023-11-29 NOTE — TELEPHONE ENCOUNTER
Patient Quality Outreach    Patient is due for the following:   Colon Cancer Screening    Next Steps:   Schedule a Adult Preventative    Type of outreach:    Sent Conferensum message.      Questions for provider review:    None           Andrea Behrend

## 2023-12-03 ENCOUNTER — HEALTH MAINTENANCE LETTER (OUTPATIENT)
Age: 58
End: 2023-12-03

## 2023-12-08 NOTE — TELEPHONE ENCOUNTER
Patient Quality Outreach    Patient is due for the following:   Breast Cancer Screening - Mammogram    Next Steps:   Schedule a Adult Preventative    Type of outreach:    Phone, left message for patient/parent to call back.    Next Steps:  Reach out within 90 days via [employer].    Max number of attempts reached: Yes. Will try again in 90 days if patient still on fail list.    Questions for provider review:    None           Andrea Behrend           None

## 2024-02-23 ENCOUNTER — PATIENT OUTREACH (OUTPATIENT)
Dept: FAMILY MEDICINE | Facility: CLINIC | Age: 59
End: 2024-02-23
Payer: COMMERCIAL

## 2024-02-23 NOTE — TELEPHONE ENCOUNTER
Pt called back , she wants to speak to her doctor to follow up about the kidney so she will come to appt on monday    Dilcia Wolfe RN, BSN  [facility]

## 2024-02-23 NOTE — TELEPHONE ENCOUNTER
Tried to call patient as she has an appointment at [address] [place] [address] for an appointment.  The appointment notes state she got a phone call for a mammogram.      LVM for patient to call me to verify if she would like to see Dr. Loya or does she want a mammogram appointment only?  Or does she want to do both?  Patient does not need to see her provider if she wants a routine screening mammogram.  HOWEVER, if she does have changes or concerns with her breasts she will need to see her provider.

## 2024-02-26 ENCOUNTER — ORDERS ONLY (AUTO-RELEASED) (OUTPATIENT)
Dept: FAMILY MEDICINE | Facility: CLINIC | Age: 59
End: 2024-02-26

## 2024-02-26 ENCOUNTER — OFFICE VISIT (OUTPATIENT)
Dept: FAMILY MEDICINE | Facility: CLINIC | Age: 59
End: 2024-02-26
Payer: COMMERCIAL

## 2024-02-26 VITALS
TEMPERATURE: 97.9 F | SYSTOLIC BLOOD PRESSURE: 124 MMHG | DIASTOLIC BLOOD PRESSURE: 75 MMHG | BODY MASS INDEX: 28.63 KG/M2 | RESPIRATION RATE: 18 BRPM | WEIGHT: 142 LBS | HEART RATE: 65 BPM | HEIGHT: 59 IN | OXYGEN SATURATION: 100 %

## 2024-02-26 DIAGNOSIS — N18.4 CHRONIC KIDNEY DISEASE, STAGE 4 (SEVERE) (H): ICD-10-CM

## 2024-02-26 DIAGNOSIS — E79.0 HYPERURICEMIA: ICD-10-CM

## 2024-02-26 DIAGNOSIS — Z12.11 SCREEN FOR COLON CANCER: ICD-10-CM

## 2024-02-26 DIAGNOSIS — E78.5 HYPERLIPIDEMIA LDL GOAL <130: ICD-10-CM

## 2024-02-26 DIAGNOSIS — L29.9 ITCHING: Primary | ICD-10-CM

## 2024-02-26 LAB
ALBUMIN SERPL BCG-MCNC: 4.2 G/DL (ref 3.5–5.2)
ALP SERPL-CCNC: 96 U/L (ref 40–150)
ALT SERPL W P-5'-P-CCNC: 18 U/L (ref 0–50)
ANION GAP SERPL CALCULATED.3IONS-SCNC: 12 MMOL/L (ref 7–15)
AST SERPL W P-5'-P-CCNC: 22 U/L (ref 0–45)
BILIRUB SERPL-MCNC: 0.2 MG/DL
BUN SERPL-MCNC: 48 MG/DL (ref 6–20)
CALCIUM SERPL-MCNC: 9 MG/DL (ref 8.6–10)
CHLORIDE SERPL-SCNC: 108 MMOL/L (ref 98–107)
CHOLEST SERPL-MCNC: 289 MG/DL
CREAT SERPL-MCNC: 2.67 MG/DL (ref 0.51–0.95)
CREAT UR-MCNC: 66.9 MG/DL
DEPRECATED HCO3 PLAS-SCNC: 19 MMOL/L (ref 22–29)
EGFRCR SERPLBLD CKD-EPI 2021: 20 ML/MIN/1.73M2
FASTING STATUS PATIENT QL REPORTED: YES
GLUCOSE SERPL-MCNC: 127 MG/DL (ref 70–99)
HDLC SERPL-MCNC: 66 MG/DL
HGB BLD-MCNC: 11 G/DL (ref 11.7–15.7)
LDLC SERPL CALC-MCNC: 204 MG/DL
MICROALBUMIN UR-MCNC: 496 MG/L
MICROALBUMIN/CREAT UR: 741.41 MG/G CR (ref 0–25)
NONHDLC SERPL-MCNC: 223 MG/DL
POTASSIUM SERPL-SCNC: 5.3 MMOL/L (ref 3.4–5.3)
PROT SERPL-MCNC: 7.6 G/DL (ref 6.4–8.3)
SODIUM SERPL-SCNC: 139 MMOL/L (ref 135–145)
TRIGL SERPL-MCNC: 96 MG/DL
URATE SERPL-MCNC: 8 MG/DL (ref 2.4–5.7)

## 2024-02-26 PROCEDURE — 82570 ASSAY OF URINE CREATININE: CPT | Performed by: FAMILY MEDICINE

## 2024-02-26 PROCEDURE — 84550 ASSAY OF BLOOD/URIC ACID: CPT | Performed by: FAMILY MEDICINE

## 2024-02-26 PROCEDURE — 36415 COLL VENOUS BLD VENIPUNCTURE: CPT | Performed by: FAMILY MEDICINE

## 2024-02-26 PROCEDURE — 82043 UR ALBUMIN QUANTITATIVE: CPT | Performed by: FAMILY MEDICINE

## 2024-02-26 PROCEDURE — 80061 LIPID PANEL: CPT | Performed by: FAMILY MEDICINE

## 2024-02-26 PROCEDURE — 91320 SARSCV2 VAC 30MCG TRS-SUC IM: CPT | Performed by: FAMILY MEDICINE

## 2024-02-26 PROCEDURE — 99214 OFFICE O/P EST MOD 30 MIN: CPT | Performed by: FAMILY MEDICINE

## 2024-02-26 PROCEDURE — 80053 COMPREHEN METABOLIC PANEL: CPT | Performed by: FAMILY MEDICINE

## 2024-02-26 PROCEDURE — 90480 ADMN SARSCOV2 VAC 1/ONLY CMP: CPT | Performed by: FAMILY MEDICINE

## 2024-02-26 PROCEDURE — 85018 HEMOGLOBIN: CPT | Performed by: FAMILY MEDICINE

## 2024-02-26 RX ORDER — HYDROCORTISONE 2.5 %
CREAM (GRAM) TOPICAL 2 TIMES DAILY PRN
COMMUNITY
Start: 2023-03-31

## 2024-02-26 RX ORDER — WITCH HAZEL 500 MG/1
CLOTH TOPICAL
COMMUNITY
Start: 2023-11-09

## 2024-02-26 RX ORDER — CETIRIZINE HYDROCHLORIDE 10 MG/1
10 TABLET ORAL DAILY
Qty: 90 TABLET | Refills: 0 | Status: SHIPPED | OUTPATIENT
Start: 2024-02-26

## 2024-02-26 SDOH — HEALTH STABILITY: PHYSICAL HEALTH: ON AVERAGE, HOW MANY MINUTES DO YOU ENGAGE IN EXERCISE AT THIS LEVEL?: 10 MIN

## 2024-02-26 SDOH — HEALTH STABILITY: PHYSICAL HEALTH: ON AVERAGE, HOW MANY DAYS PER WEEK DO YOU ENGAGE IN MODERATE TO STRENUOUS EXERCISE (LIKE A BRISK WALK)?: 3 DAYS

## 2024-02-26 ASSESSMENT — SOCIAL DETERMINANTS OF HEALTH (SDOH): HOW OFTEN DO YOU GET TOGETHER WITH FRIENDS OR RELATIVES?: NEVER

## 2024-02-26 NOTE — PROGRESS NOTES
"Office Visit  [facility]  Date of Service: Feb 26, 2024      Subjective   Missy Vance is a 58 year old female who presents for   Chief Complaint   Patient presents with    Discuss about kidney     Mammogram     Itching all over  Taking 2 medicines  Started 2-3 weeks ago.  Red rashes - where scratching    The cholesterol medicine made her heart race.  Stopped it for a week. No change in itching    Overall: energy OK. No stomach problems. No liver symptoms.   Has had some wheezing, but no coughing. Didn't feel sick.     Influenza shot makes her feel fever.    Objective   /75 (BP Location: Right arm, Patient Position: Sitting, Cuff Size: Adult Regular)   Pulse 65   Temp 97.9  F (36.6  C) (Temporal)   Resp 18   Ht 1.501 m (4' 11.09\")   Wt 64.4 kg (142 lb)   LMP  (LMP Unknown)   SpO2 100%   BMI 28.59 kg/m   She reports that she has never smoked. She has never been exposed to tobacco smoke. She has never used smokeless tobacco.    Gen: Alert, no apparent distress.    No results found for any visits on 02/26/24.  Assessment & Plan     Itching.  Suspect allergy related with histamine excess.  Start cetirizine 10 mg daily.  Hyperlipidemia.  .  Stopped her pravastatin due to the itching.  I like to trial her on rosuvastatin 10 mg daily, to see if she will tolerate it.  Atorvastatin caused muscle aches.  Recheck in 6 weeks.  : Can screening.  Will do Cologuard.      Order Summary                                                      Itching  -     cetirizine (ZYRTEC) 10 MG tablet; Take 1 tablet (10 mg) by mouth daily For itching    Chronic kidney disease, stage 4 (severe) (H)  -     Albumin Random Urine Quantitative with Creat Ratio; Future  -     Hemoglobin; Future  -     Hemoglobin  -     Albumin Random Urine Quantitative with Creat Ratio  -     Comprehensive metabolic panel (BMP + Alb, Alk Phos, ALT, AST, Total. Bili, TP); Future  -     Comprehensive metabolic " panel (BMP + Alb, Alk Phos, ALT, AST, Total. Bili, TP)  -     Uric acid; Future  -     Uric acid    Hyperlipidemia LDL goal <130  -     Lipid panel reflex to direct LDL Non-fasting; Future  -     Lipid panel reflex to direct LDL Non-fasting    Screen for colon cancer  -     COLOGUARD(EXACT SCIENCES); Future    Other orders  -     Witch Hazel (HEMORRHOIDAL) 50 % PADS; APPLY TOPICALLY AS NEEDED FOR HEMORRHOIDS  -     hydrocortisone 2.5 % cream; Place rectally 2 times daily as needed PLACE RECTALLY 2 TIMES DAILY AS NEEDED FOR HEMORRHOIDS  -     COVID-19 12+ (2023-24) (PFIZER)  -     REVIEW OF HEALTH MAINTENANCE PROTOCOL ORDERS            No future appointments.    Completed by: Myranda Loya M.D., [facility]. 2/26/2024 8:26 AM.  This transcription uses voice recognition software, which may contain typographical errors.  MDM: SDOH neighborhood: [place] [address], language: Hmong, .Prior to immunization administration, verified patients identity using patient s name and date of birth. Please see Immunization Activity for additional information.     Screening Questionnaire for Adult Immunization    Are you sick today?   No   Do you have allergies to medications, food, a vaccine component or latex?   Yes   Have you ever had a serious reaction after receiving a vaccination?   No   Do you have a long-term health problem with heart, lung, kidney, or metabolic disease (e.g., diabetes), asthma, a blood disorder, no spleen, complement component deficiency, a cochlear implant, or a spinal fluid leak?  Are you on long-term aspirin therapy?   Yes   Do you have cancer, leukemia, HIV/AIDS, or any other immune system problem?   No   Do you have a parent, brother, or sister with an immune system problem?   No   In the past 3 months, have you taken medications that affect  your immune system, such as prednisone, other steroids, or anticancer drugs; drugs for the treatment of rheumatoid arthritis, Crohn s disease,  or psoriasis; or have you had radiation treatments?   No   Have you had a seizure, or a brain or other nervous system problem?   No   During the past year, have you received a transfusion of blood or blood    products, or been given immune (gamma) globulin or antiviral drug?   No   For women: Are you pregnant or is there a chance you could become       pregnant during the next month?   No   Have you received any vaccinations in the past 4 weeks?   No     Immunization questionnaire was positive for at least one answer.  Notified Dr. Loya.      Patient instructed to remain in clinic for 15 minutes afterwards, and to report any adverse reactions.     Screening performed by Gabe Andre MA on 2/26/2024 at 8:10 AM.        Answers submitted by the patient for this visit:  General Questionnaire (Submitted on 2/26/2024)  Chief Complaint: Chronic problems general questions HPI Form  What is the reason for your visit today? : Follow up  How many minutes a day do you exercise enough to make your heart beat faster?: 9 or less  How many days a week do you exercise enough to make your heart beat faster?: 3 or less  How many days per week do you miss taking your medication?: 0

## 2024-03-01 ENCOUNTER — APPOINTMENT (OUTPATIENT)
Dept: INTERPRETER SERVICES | Facility: CLINIC | Age: 59
End: 2024-03-01
Payer: COMMERCIAL

## 2024-03-01 ENCOUNTER — PATIENT OUTREACH (OUTPATIENT)
Dept: CARE COORDINATION | Facility: CLINIC | Age: 59
End: 2024-03-01
Payer: COMMERCIAL

## 2024-03-01 ENCOUNTER — TELEPHONE (OUTPATIENT)
Dept: FAMILY MEDICINE | Facility: CLINIC | Age: 59
End: 2024-03-01

## 2024-03-01 DIAGNOSIS — Z59.71 DOES NOT HAVE HEALTH INSURANCE: Primary | ICD-10-CM

## 2024-03-01 NOTE — TELEPHONE ENCOUNTER
Test Results    Contacts         Type Contact Phone/Fax    03/01/2024 02:59 PM CST Phone (Incoming) Missy Vance (Self) [phone] (M)        Who ordered the test:  Dr Loya    Type of test: Lab    Date of test:  2/26/24    Where was the test performed:  [facility]    What are your questions/concerns?:  Patient looking for lab results done on 2/26/24  Discussed with patient that Dr Loya only in clinic this morning and ok to wait till next week for results.  Patient okay with plan.    Could we send this information to you in [employer] or would you prefer to receive a phone call?:   Patient would prefer a phone call   Okay to leave a detailed message?: No Cell number on file:  Pt needs     Telephone Information:   Mobile [phone]     Message routed to Dr Loya for results    Roslyn Montana RN  [facility]

## 2024-03-06 RX ORDER — ROSUVASTATIN CALCIUM 10 MG/1
10 TABLET, COATED ORAL DAILY
Qty: 30 TABLET | Refills: 0 | Status: SHIPPED | OUTPATIENT
Start: 2024-03-06 | End: 2024-08-09 | Stop reason: SINTOL

## 2024-03-06 RX ORDER — ALLOPURINOL 300 MG/1
300 TABLET ORAL DAILY
Qty: 90 TABLET | Refills: 3 | Status: SHIPPED | OUTPATIENT
Start: 2024-03-06

## 2024-03-07 NOTE — TELEPHONE ENCOUNTER
Contacted patient and relayed result message from provider. Patient verbalizes understanding, but is losing her insurance due to income limits on MA and may not be able to  new cholesterol medication/schedule follow-up visits until able to get coverage.  Patient will check with pharmacy to see if they know of any discount programs to assist.  Discussed care coordination referral with patient--she agrees to this for assistance navigating health insurance marketplace. Referral placed.  Patient will use leftover allopurinol for 300mg dose for now.

## 2024-03-07 NOTE — TELEPHONE ENCOUNTER
----- Message from Anu Bryant MA sent at 3/6/2024  1:14 PM CST -----    ----- Message -----  From: Myranda Loya MD  Sent: 3/6/2024  12:57 PM CST  To: [facility]/Ob Support Pool    The amount of protein in her urine is significantly improved compared to last year.  This is good for her kidney health.  The blood test for the kidneys is stable.  She still has the chronic kidney disease, but no signs of significant worsening.    Her cholesterol is VERY high.  I know she had muscle pain on atorvastatin, and is currently on pravastatin.  But is not really getting her cholesterol down enough..  I would like her to try using a different cholesterol medicine, called rosuvastatin.  If she tolerates it, it is a better medication for her.  Please schedule a cholesterol recheck in 6 weeks and a visit 1 week after the lab visit.    Her uric acid is still high.  The uric acid is what causes gout.  Increase allopurinol.  OLD dose 100 mg, NEW dose is 300 mg daily.    She is still anemic, but blood counts are okay for now.

## 2024-03-08 ENCOUNTER — PATIENT OUTREACH (OUTPATIENT)
Dept: CARE COORDINATION | Facility: CLINIC | Age: 59
End: 2024-03-08
Payer: COMMERCIAL

## 2024-03-08 ENCOUNTER — TELEPHONE (OUTPATIENT)
Dept: FAMILY MEDICINE | Facility: CLINIC | Age: 59
End: 2024-03-08
Payer: COMMERCIAL

## 2024-03-08 DIAGNOSIS — Z71.89 OTHER SPECIFIED COUNSELING: Primary | Chronic | ICD-10-CM

## 2024-03-08 NOTE — PROGRESS NOTES
3/8/2024  Clinic Care Coordination Contact  Community Health Worker Initial Outreach    CHW Initial Information Gathering:  Referral Source: PCP  Preferred Hospital: [facility]  [phone]  Preferred Urgent Care: [facility], [phone]  Current living arrangement:: I live in a private home with family, I live in a private home with spouse (live wiht  and child)  Type of residence:: Private home - stairs  Community Resources: None  Supplies Currently Used at Home: None  Equipment Currently Used at Home: none  Informal Support system:: Spouse  No PCP office visit in Past Year: No  Transportation means:: Regular car (drive self to appt)  CHW Additional Questions  If ED/Hospital discharge, follow-up appointment scheduled as recommended?: N/A  Medication changes made following ED/Hospital discharge?: N/A  MyChart active?: Yes  Patient sent Social Determinants of Health questionnaire?: No    Patient accepts CC: Yes. Patient scheduled for assessment with CC RN  on 3-15-24  at 10am. Patient noted desire to discuss no medications because she does not have insurance.   PCP referral: Per patient: Losing insurance. Previously covered under MA, income exceeds limit for coverage in 2024.    doreen : Veronica ID# [id number]     The Clinic Community Health Worker called and spoke with the patient today at the request of the PCP to discuss possible Clinic Care Coordination enrollment.    The service was described to the patient and immediate needs were discussed.    The patient agreed to enroll in CCC.    Patient renew her MA insurance last year but then she received a letter from the [facility] back in October 2023 that she is not  eligible for MA due to income too high.  Now does not have any medications due to no insurance.    Patient background info  -lives with her  and child in a house.  -working still  -no SNAP benefits or any support from the [facility].  -has MA  last year but this year no health insurance due to income   -can't afford medications due to no insurance    Informed patient referral sent to FRW for support regarding health insurance.  Patient okay for FRW to call.  She works M-T from 5am to 4pm off on Friday. Best day to call is Friday any time.    CHW review assessment, goals and consult with CC RN 3-18-24    Fariba Laurent  Community Health Worker  [facility] Care Coordination  [email or web address]   Office: [phone]  Fax: [phone]

## 2024-03-08 NOTE — Clinical Note
Assessment 3-15-24 at 10am can't afford medications due to no health insurance CHW sent referral to FRW for support to address health insurance. -can't afford medications due to no insurance  Pt works M-T from 5am to 4pm off on Friday. Best day to call is Friday any time.

## 2024-03-08 NOTE — TELEPHONE ENCOUNTER
Patient Quality Outreach    Patient is due for the following:   Breast Cancer Screening - Mammogram    Next Steps:   Schedule a Adult Preventative    Type of outreach:    Sent Kudarom message.      Questions for provider review:    None           Andrea Behrend

## 2024-03-11 ENCOUNTER — PATIENT OUTREACH (OUTPATIENT)
Dept: CARE COORDINATION | Facility: CLINIC | Age: 59
End: 2024-03-11
Payer: COMMERCIAL

## 2024-03-15 ENCOUNTER — PATIENT OUTREACH (OUTPATIENT)
Dept: CARE COORDINATION | Facility: CLINIC | Age: 59
End: 2024-03-15

## 2024-03-15 ENCOUNTER — PATIENT OUTREACH (OUTPATIENT)
Dept: NURSING | Facility: CLINIC | Age: 59
End: 2024-03-15
Payer: COMMERCIAL

## 2024-03-15 ASSESSMENT — ACTIVITIES OF DAILY LIVING (ADL): DEPENDENT_IADLS:: INDEPENDENT

## 2024-03-15 NOTE — LETTER
Nyob zoo Kanjana ,    Kuv yog ib tugtxhim tsa janet saib xyuas ntawm qhov chaw jonathan mob uas ua hauj lwm nrog Dr. Shalini [facility].Kuv jenni ntawv tuaj qhia koj paub txog peb txoj robe num uas yog txhim tsa janet saib xyuas john hauv qhov chaw jonathan mob thiab peb tseem pab txhawb nqa koj tau kom ncav cuag koj rylee deb phiaj ntawm janet jonathan mob.    Peb pab pawg txhim tsa janet saib xyuas john qhov chaw jonathan mob muaj neeg xws li ib tug nais maum txawj, ib tug kws pab pej xeem, thiab ib tug neeg jonathan mob hauv zej zog. Lub deb phiaj john peb pab pawg no yog pab koj noj qab nyob zoo thiab pab kom thiaj yooj enrike john koj txais tau janet jonathan mob. Peb pab pawg yuav pab koj ncav cuag koj rylee deb phiaj ntawm janet jonathan mob dhau ntawm muab ntaub ntawv ntawm janet noj qab nyob zoo john koj, txhim tsa janet pab, ntxiv zog john janet tiv tauj ntawm koj thiab koj tus kws jonathan mob, thiab txhawb nqa koj yog koj yuav tsum muaj dab tsi.    Thov tiv tauj tuaj john Auntim [phone]  yog koj muaj aura nug los sis janet txhawj xeeb dab tsi. Peb ua tib zoo siv zog muab janet jonathan mob zoo tshaj plaws john koj. Peb xav pab koj ncav cuag thiab ua neej nyob raws li koj rylee deb phiaj ntawm janet jonathan mob.    Jenni npe,   Primary Care Coordination Team     Dear Missy ,    I am a clinic care coordinator who works with Dr. Loya at [facility]. I'm writing to tell you about clinic care coordination, and how we may be able to support you in achieving your health-related goals.    Our clinic care coordination team consists of a registered nurse, , and community health worker. The goal of clinic care coordination is to help you manage your health and to improve your access to the health care system in an efficient way. Our team can help you meet your health care goals by providing you with health information, coordinating services, strengthening the communication among your providers, and supporting you with any resource  needs.    Please feel free to contact Aun at [phone]  with any questions or concerns. We are focused on providing you with the highest-quality health care experience possible. We want to help you achieve and maintain your health goals.    Sincerely,   Primary Care Coordination Team

## 2024-03-15 NOTE — LETTER
[facility]  Patient Centered Plan of Care  About Me:        Patient Name:  Missy Michel    YOB: 1965  Age:         58 year old   Madhu MRN:    [id number] Telephone Information:  Home Phone [phone]   Mobile [phone]       Address:  [address] [place] [address] Email address:  [email or web address]      Emergency Contact(s)    Name Relationship Lgl Grd Work Phone Home Phone Mobile Phone   1. MC MICHEL Spouse    [phone]   2. MARISELA MICHEL Son    [phone]   3. ARIANNA MICHEL Son    [phone]           Primary language:  doreen     needed? Yes   [place] [facility]:  [phone] op. 1  Other communication barriers:Language barrier    Preferred Method of Communication:     Current living arrangement: I live in a private home with family; I live in a private home with spouse (live wiht  and child)    Mobility Status/ Medical Equipment: Independent        Health Maintenance  Health Maintenance Reviewed: Due/Overdue   Health Maintenance Due   Topic Date Due    ADVANCE CARE PLANNING  06/22/2023    MAMMO SCREENING  08/20/2023    INFLUENZA VACCINE (1) 09/01/2023    COLORECTAL CANCER SCREENING  03/26/2024    YEARLY PREVENTIVE VISIT  03/31/2024          My Access Plan  Medical Emergency 911   Primary Clinic Line [facility] - [phone]   24 Hour Appointment Line [phone] or  [address] [place] [address] [phone]) (toll-free)   24 Hour Nurse Line [phone] (toll-free)   Preferred Urgent Care [facility], [phone]     Preferred Hospital [facility]  [phone]     Preferred Pharmacy [facility] - [place], [place] - [address]     Behavioral Health Crisis Line The National Suicide Prevention Lifeline at 1-210.982.8108 or Text/Call 838           My Care Team Members  Patient Care Team         Relationship Specialty Notifications Start End    Myranda Loya MD PCP -  General   8/2/19     Phone: [phone] Fax: [phone]         [address] [place] [address]    Myranda Loya MD Assigned PCP   6/16/21     Phone: [phone] Fax: [phone]         [address] [place] [address]    Fariba Laurent CHW Community Health Worker Primary Care - CC Admissions 3/7/24     Phone: [phone] Fax: [phone]         [address] [place] [address]    Yecenia Gomez RN Lead Care Coordinator Primary Care - CC Admissions 3/8/24     Phone: [phone] Fax: [phone]                    My Care Plans  Self Management and Treatment Plan    Care Plan  Care Plan: 1. FRW Goal: Obtain medical insurance       Problem: Lack of medical insurance       Goal: I will apply for health insurance within the next 1-2 weeks if I am eligible.       Start Date: 3/15/2024 Expected End Date: 6/15/2024    Note:     Strengths:  patient is enrolled with our Financial Resource Worker  Barriers: patients MA with [facility] 2/29/24    Patient expressed understanding of goal: yes  Action steps to achieve this goal:  I understand a referral was placed to the Financial Resource Worker, I will receive a call within the next 3 business days.    I understand the financial worker will make two attempts to call me. If I still need help with this goal, I will connect with my Community Health Worker Fariba at [phone].                                     Care Plan: 2. CHW Goal: Obtain prescriptions       Problem: Paying for medication       Goal: I will present the GoodRx coupon to my local pharmacy for a reduced cost       Start Date: 3/15/2024 Expected End Date: 6/15/2024    Note:     Barriers: no medical insurance   Strengths: GoodRx coupons and CC involvement   Patient expressed understanding of goal: yes   Action steps to achieve this goal:  1. I will look for the paper coupons that were sent via postal mail.   2. I will present the 2 coupons to my local pharmacy for cost savings.   3. I will  my  prescriptions.                                 Action Plans on File:                       Advance Care Plans/Directives:   Advanced Care Plan/Directives on file:   No    Discussed with patient/caregiver(s):   Declined Further Information             My Medical and Care Information  Problem List   Patient Active Problem List   Diagnosis    Hypertension goal BP (blood pressure) < 130/80    Hyperlipidemia LDL goal <130    Overweight (BMI 25.0-29.9)    Cyst of kidney, acquired    Prediabetes    IgA nephropathy    Anemia of renal disease    Chronic kidney disease, stage 4 (severe) (H)    External hemorrhoids          Care Coordination Start Date: 3/7/2024   Frequency of Care Coordination: monthly, more frequently as needed     Form Last Updated: 04/03/2024

## 2024-03-15 NOTE — PROGRESS NOTES
Clinic Care Coordination Contact  Clinic Care Coordination Contact  OUTREACH    Referral Information:  Referral Source: PCP    Primary Diagnosis: Psychosocial    Chief Complaint   Patient presents with    Clinic Care Coordination - Initial        Universal Utilization: Clinic Utilization  Difficulty keeping appointments:: No  Compliance Concerns: No  No-Show Concerns: No  No PCP office visit in Past Year: No  Utilization      No Show Count (past year)  0             ED Visits  0             Hospital Admissions  0                    Current as of: 3/12/2024  2:27 PM              Clinical Concerns:  Current Medical Concerns:  Care team referral:   Reason for Referral: Financial Support   Financial Support: Insurance   Clinical Staff have discussed the Care Coordination Referral with the patient and/or caregiver: Yes   Additional Information: Per patient: Losing insurance. Previously covered under MA, income exceeds limit for coverage in 2024.   CC RN called [employer],  services at [phone]. Call was conducted with the assistance of .   Current Behavioral Concerns: none noted while on the phone with patient.     Education Provided to patient: CC RN contacted patient, introduced self, role, care coordination program and reason for call.  Patient reports that her [facility] ended 2/29/24. Patient is employed. She will ask her employer if she can get on their health insurance to see if this situation is a qualifying life event. Patient is working with the [facility] as well for continued support for securing medical insurance. We discussed using GoodCovocativex coupons for her allopurinol and Rosuvastatin. This is not insurance, and she may present these coupons to her local pharmacy. Per print out, the cost for allopurinol is 14.77 and Rosuvastatin is 10.41. CC RN will place coupons in postal mail. Patient agreeable to try this.    Health Maintenance Reviewed: Due/Overdue    Health Maintenance Due   Topic Date Due    ADVANCE CARE PLANNING  06/22/2023    MAMMO SCREENING  08/20/2023    INFLUENZA VACCINE (1) 09/01/2023    COLORECTAL CANCER SCREENING  03/26/2024    YEARLY PREVENTIVE VISIT  03/31/2024    Clinical Pathway: None    Medication Management:  Medication review status: Medications reviewed and no changes reported per patient.          Functional Status:  Dependent ADLs:: Independent  Dependent IADLs:: Independent  Bed or wheelchair confined:: No  Mobility Status: Independent  Fallen 2 or more times in the past year?: No  Any fall with injury in the past year?: No    Living Situation:  Current living arrangement:: I live in a private home with family, I live in a private home with spouse (live wiht  and child)  Type of residence:: Private home - \[facility] [place] [facility]\""    Lifestyle & Psychosocial Needs:    Social Determinants of Health     Food Insecurity: Low Risk  (2/26/2024)    Food Insecurity     Within the past 12 months, did you worry that your food would run out before you got money to buy more?: No     Within the past 12 months, did the food you bought just not last and you didn t have money to get more?: No   Depression: Not at risk (2/26/2024)    PHQ-2     PHQ-2 Score: 0   Housing Stability: Low Risk  (2/26/2024)    Housing Stability     Do you have housing? : Yes     Are you worried about losing your housing?: No   Tobacco Use: Low Risk  (2/26/2024)    Patient History     Smoking Tobacco Use: Never     Smokeless Tobacco Use: Never     Passive Exposure: Never   Financial Resource Strain: Low Risk  (2/26/2024)    Financial Resource Strain     Within the past 12 months, have you or your family members you live with been unable to get utilities (heat, electricity) when it was really needed?: No   Alcohol Use: Not on file   Transportation Needs: Low Risk  (2/26/2024)    Transportation Needs     Within the past 12 months, has lack of transportation kept you from medical appointments,  "getting your medicines, non-medical meetings or appointments, work, or from getting things that you need?: No   Physical Activity: Insufficiently Active (2/26/2024)    Exercise Vital Sign     Days of Exercise per Week: 3 days     Minutes of Exercise per Session: 10 min   Interpersonal Safety: Low Risk  (2/26/2024)    Interpersonal Safety     Do you feel physically and emotionally safe where you currently live?: Yes     Within the past 12 months, have you been hit, slapped, kicked or otherwise physically hurt by someone?: No     Within the past 12 months, have you been humiliated or emotionally abused in other ways by your partner or ex-partner?: No   Stress: No Stress Concern Present (2/26/2024)    [employer] [employer] - Occupational Stress Questionnaire     Feeling of Stress : Not at all   Social Connections: Unknown (2/26/2024)    Social Connection and Isolation Panel [NHANES]     Frequency of Communication with Friends and Family: Not on file     Frequency of Social Gatherings with Friends and Family: Never     Attends Latter-day Services: Not on file     Active Member of Clubs or Organizations: Not on file     Attends Club or Organization Meetings: Not on file     Marital Status: Not on file     Diet:: Low cholesterol  Inadequate nutrition (GOAL):: No  Tube Feeding: No  Inadequate activity/exercise (GOAL):: No  Significant changes in sleep pattern (GOAL): No  Transportation means:: Regular car     Latter-day or spiritual beliefs that impact treatment:: No  Mental health DX:: No (\"in the past, but not now\")  Mental health management concern (GOAL):: No  Chemical Dependency Status: No Current Concerns  Informal Support system:: Spouse     Resources and Interventions:  Current Resources:   Community Resources: None  Supplies Currently Used at Home: None  Equipment Currently Used at Home: none  Employment Status: employed full-time     Advance Care Plan/Directive  Advanced Care Plans/Directives on " file:: No  Discussed with patient/caregiver:: Declined Further Information     Care Plan:  Care Plan: 1. FRW Goal: Obtain medical insurance       Problem: Lack of medical insurance       Goal: I will apply for health insurance within the next 1-2 weeks if I am eligible.       Start Date: 3/15/2024 Expected End Date: 6/15/2024    Note:     Strengths:  patient is enrolled with our Financial Resource Worker  Barriers: patients MA with [facility] ended 2/29/24    Patient expressed understanding of goal: yes  Action steps to achieve this goal:  I understand a referral was placed to the Financial Resource Worker, I will receive a call within the next 3 business days.    I understand the financial worker will make two attempts to call me. If I still need help with this goal, I will connect with my Community Health Worker Aun at [phone].                                     Care Plan: 2. CHW Goal: Obtain prescriptions       Problem: Paying for medication       Goal: I will present the GoodRx coupon to my local pharmacy for a reduced cost       Start Date: 3/15/2024 Expected End Date: 6/15/2024    Note:     Barriers: no medical insurance   Strengths: GoodRx coupons and CC involvement   Patient expressed understanding of goal: yes   Action steps to achieve this goal:  1. I will look for the paper coupons that were sent via postal mail.   2. I will present the 2 coupons to my local pharmacy for cost savings.   3. I will  my prescriptions.                               Patient/Caregiver understanding: yes     Outreach Frequency: monthly, more frequently as needed  Future Appointments                In 2 weeks Lisette Gotti MA [facility] Care Coordination,  FELICITAS            Plan: 1. Patient verbalized understanding of CC Goal and resources provided. She will continue to work with FRW, CHW and CC RN.   2. Patient enrolled in Care Coordination, goal(s) identified at this time.   3. Patient centered care plan,  and introduction letter sent to patient. Print out of the GoodRx coupons sent via mail.   4. CHW will reach out to patient per standard workflow and support goal(s), CC RN will review chart in 6 weeks to review goal progression.      Yecenia Gomez RN, BSN, PHN Care Coordinator  [facility] and [facility]   Phone: [phone]

## 2024-03-18 NOTE — TELEPHONE ENCOUNTER
Patient Quality Outreach    Patient is due for the following:   Breast Cancer Screening - Mammogram    Next Steps:   Schedule a Adult Preventative    Type of outreach:    Unable to contact patient.  Waited over 3 minutes for telephone .    Next Steps:  Reach out within 90 days via CheckiO.    Max number of attempts reached: Yes. Will try again in 90 days if patient still on fail list.    Questions for provider review:    None           Andrea Behrend

## 2024-04-01 ENCOUNTER — PATIENT OUTREACH (OUTPATIENT)
Dept: CARE COORDINATION | Facility: CLINIC | Age: 59
End: 2024-04-01

## 2024-04-01 ENCOUNTER — APPOINTMENT (OUTPATIENT)
Dept: CARE COORDINATION | Facility: CLINIC | Age: 59
End: 2024-04-01
Payer: COMMERCIAL

## 2024-04-12 ENCOUNTER — APPOINTMENT (OUTPATIENT)
Dept: INTERPRETER SERVICES | Facility: CLINIC | Age: 59
End: 2024-04-12
Payer: COMMERCIAL

## 2024-04-12 ENCOUNTER — PATIENT OUTREACH (OUTPATIENT)
Dept: CARE COORDINATION | Facility: CLINIC | Age: 59
End: 2024-04-12
Payer: COMMERCIAL

## 2024-04-12 NOTE — PROGRESS NOTES
"4/12/2024  Clinic Care Coordination Contact  [facility]/Voicemail    Clinical Data: Care Coordinator Outreach    Outreach Documentation Number of Outreach Attempt   4/12/2024  11:06 AM 1     [facility] Samia : Ag Desouza    No answer. No VM set up unable to leave message on patient's voicemail with call back information and requested return call.  Home number is not inservice    Plan: Care Coordinator will try to reach patient again in 10 business days.    Per FRW note \"pt is over the [place]. She is able to get insurance thru her employer so need to look for a private plans with mnsure \"  Patient not eligible for MA due to over income but patient getting insurance through work place.  Goal completed.    CHW follow up: 2nd attempt 4-25-24    Fariba Laurent  Community Health Worker  [facility]  Clinic Care Coordination  [email or web address]   Office: [phone]  Fax: [phone]            "

## 2024-04-24 ENCOUNTER — PATIENT OUTREACH (OUTPATIENT)
Dept: CARE COORDINATION | Facility: CLINIC | Age: 59
End: 2024-04-24
Payer: COMMERCIAL

## 2024-04-24 NOTE — PROGRESS NOTES
Clinic Care Coordination Contact  Care Coordination Clinician Chart Review    Situation: Patient chart reviewed by Care Coordinator.       Background: Care Coordination Program started: 3/7/2024. Initial assessment completed and patient-centered care plan(s) were developed with participation from patient. Lead CC handed patient off to CHW for continued outreaches.       Assessment: Per chart review, patient outreach attempted by CC CHW on 4/12/24 but was [facility] patient. CHW is planning to outreach again to check in on goal progression.  FRW did have contact with patient see 4/1/24 note for outcome. Patient is not due for updated Plan of Care.  Assessments will be completed annually or as needed/with change of patient status.      Care Plan: 2. CHW Goal: Obtain prescriptions       Problem: Paying for medication       Goal: I will present the GoodRx coupon to my local pharmacy for a reduced cost       Start Date: 3/15/2024 Expected End Date: 6/15/2024    Note:     Barriers: no medical insurance   Strengths: GoodRx coupons and CC involvement   Patient expressed understanding of goal: yes   Action steps to achieve this goal:  1. I will look for the paper coupons that were sent via postal mail.   2. I will present the 2 coupons to my local pharmacy for cost savings.   3. I will  my prescriptions.                                    Plan/Recommendations: The patient will continue working with Care Coordination to achieve goal(s) as above. CHW will continue outreaches at minimum every 30 days and will involve Lead CC as needed or if patient is ready to move to Maintenance. Lead CC will continue to monitor CHW outreaches and patient's progress to goal(s) every 6 weeks.     Plan of Care updated and sent to patient: Christina Gomez RN, BSN, PHN Care Coordinator  [facility]   Phone: [phone]

## 2024-04-25 ENCOUNTER — APPOINTMENT (OUTPATIENT)
Dept: INTERPRETER SERVICES | Facility: CLINIC | Age: 59
End: 2024-04-25
Payer: COMMERCIAL

## 2024-04-25 ENCOUNTER — PATIENT OUTREACH (OUTPATIENT)
Dept: CARE COORDINATION | Facility: CLINIC | Age: 59
End: 2024-04-25
Payer: COMMERCIAL

## 2024-04-25 NOTE — PROGRESS NOTES
4/25/2024  Clinic Care Coordination Contact  [facility]/Voicemail    Clinical Data: Care Coordinator Outreach    Outreach Documentation Number of Outreach Attempt   4/12/2024  11:06 AM 1       4/25/2024  11:53 AM 2       [facility] Samia : Singh    Care Coordinator Outreach: follow up on goal(s)     left message on patient's voicemail with call back information and requested return call.    Plan: Care Coordinator routed to CC RN to review chart before sending disenrollment letter with care coordinator contact information via mail.     Care Coordinator will wait for CC RN to respond by 5-2-24    Fariba Laurent  Community Health Worker  [facility] Care Coordination  [email or web address]   Office: [phone]  Fax: [phone]

## 2024-04-25 NOTE — LETTER
[facility] CARE COORDINATION  [address] [address]   April 25, 2024    Missy Vance  [address] [place] [address]      Dear Missy,    I have been unsuccessful in reaching you since our last contact. At this time the Care Coordination team will make no further attempts to reach you, however this does not change your ability to continue receiving care from your providers at your primary care clinic. If you need additional support from a care coordinator in the future please contact Aun our Community Heatlh Worker (CHW) at [phone]     All of us at [facility] are invested in your health and are here to assist you in meeting your goals.     Sincerely,    Primary Care Coordination Team

## 2024-04-25 NOTE — PROGRESS NOTES
4/25/2024  Clinic Care Coordination Contact  Community Health Worker Follow Up    Care Gaps:     Health Maintenance Due   Topic Date Due    ADVANCE CARE PLANNING  06/22/2023    MAMMO SCREENING  08/20/2023    COLORECTAL CANCER SCREENING  03/26/2024    YEARLY PREVENTIVE VISIT  03/31/2024    BMP  05/26/2024    MICROALBUMIN  05/26/2024       Care Gaps Last addressed on 4-25-24, Care Gap Goal set: Yes, and Scheduled [phone]      Care Plan:     Care Plan: 1. FRW Goal: Obtain medical insurance completed 4-12-24   Completed 4/12/2024     Problem: Lack of medical insurance Resolved 4/12/2024        Goal: I have health insurance through my workplace. Completed 4/12/2024   Start Date: 3/15/2024   Expected End Date: 6/15/2024   This Visit's Progress: 100%   Note:    Strengths:  patient is enrolled with our Financial Resource Worker  Barriers: patients MA with [facility] ended 2/29/24  Patient expressed understanding of goal: yes  Action steps to achieve this goal:  Per FRW note patient not eligible for MA due to over income but has insurance through workplace.                Task: General Task - please update text Completed 4/12/2024   Responsible User: Yecenia Gomez RN        Care Plan: Yearly Preventative Visit        Problem: HP GENERAL PROBLEM         Goal: I will schedule Yearly Preventative visit with the doctor within 1-3 months.    Start Date: 4/25/2024   Expected End Date: 7/31/2024   This Visit's Progress: 50%   Note:    Barriers: language, access to when next annual wellness visit  Strengths: has MA, motivate to schedule  Patient expressed understanding of goal: yes  Action steps to achieve this goal:  1. I  will wait for clinic to call to support with scheduling yearly check up with the doctor at [facility].  [address] [place] [address]  [phone]  Fax: [phone]           Task: General Task - please update text Completed 5/6/2024   Responsible User: Mehran  Fariba, CHW        Care Plan: Mammogram        Problem: HP GENERAL PROBLEM         Goal: I will schedule for mammogram within 1-3 months.    Start Date: 4/25/2024   Expected End Date: 7/31/2024   This Visit's Progress: 50%   Note:    Barriers: language, access to when next annual wellness visit  Strengths: has MA insurance, motivate to schedule  Patient expressed understanding of goal: yes  Action steps to achieve this goal:  1. I  will wait for the clinic to call to schedule for mammogram.         Task: General Task - please update text Completed 5/6/2024   Responsible User: Fariba Laurent, CHW               Clinic Care Coordination Contact  Community Health Worker Follow Up    Intervention and Education during outreach:   Pt return cCHW call  Called and spoke with patient and follow up on goal(s).  Patient reported:Said she had insurance through her work place been approved   Just waitng for the card  Has medications until June 2024  Would like to chedule for Mammogram and AWV  Wants clinic to call to her  Not able to schedle right now     Send staff message ot  to schedule AWV and mammogram same day      CHW Follow up: Monthly  CHW Plan: Follow up on goal (s)  CHW Next Follow Up: 5-29-24    Fariba Laurent  Community Health Worker  [facility]  Clinic Care Coordination  [email or web address]   Office: [phone]  Fax: [phone]

## 2024-04-25 NOTE — PROGRESS NOTES
Clinic Care Coordination Contact    Situation: Patient chart reviewed by care coordinator.    Background: patient enrolled in CC    Assessment: CHW unable to reach x2. Appropriate to follow UTR primary care coordination standard workflow. Of note, FRW was successful in working with patient. Please see 4/1/24 note for outcome.      Plan/Recommendations: CC RN sent disenrollment letter and no further outreach will be done. Care team may place a new CC order at any time going forward.   FYI to care team.     Thank you, Yecenia Gomez RN, BSN, PHN Care Coordinator  [place], [place], and [facility]   Phone: [phone]

## 2024-04-25 NOTE — Clinical Note
Dear Care Team,  Unable to contact patient via phone x2. Disenrollment letter sent. Care team may place a new CC order at any time going forward. Thank you, Yecenia Gomez RN, BSN, PHN Care Coordinator [place], [place], and [facility] Bryant  Phone: [phone]

## 2024-05-06 ENCOUNTER — TELEPHONE (OUTPATIENT)
Dept: CARE COORDINATION | Facility: CLINIC | Age: 59
End: 2024-05-06
Payer: COMMERCIAL

## 2024-05-06 DIAGNOSIS — Z12.31 VISIT FOR SCREENING MAMMOGRAM: Primary | ICD-10-CM

## 2024-05-06 NOTE — TELEPHONE ENCOUNTER
Please schedule.   Missy was seen today for orders.    Diagnoses and all orders for this visit:    Visit for screening mammogram  -     *MA Screening Digital Bilateral; Future

## 2024-05-06 NOTE — TELEPHONE ENCOUNTER
5/6/2024  RE: need order for mammogram    Patient interested in completing mammogram on the same day when she sees Dr. Loya on 7-23-24 at 9am.

## 2024-05-08 ENCOUNTER — APPOINTMENT (OUTPATIENT)
Dept: INTERPRETER SERVICES | Facility: CLINIC | Age: 59
End: 2024-05-08
Payer: COMMERCIAL

## 2024-05-21 DIAGNOSIS — N18.4 CHRONIC KIDNEY DISEASE, STAGE 4 (SEVERE) (H): ICD-10-CM

## 2024-05-22 RX ORDER — AMLODIPINE AND BENAZEPRIL HYDROCHLORIDE 5; 20 MG/1; MG/1
CAPSULE ORAL
Qty: 90 CAPSULE | Refills: 3 | Status: SHIPPED | OUTPATIENT
Start: 2024-05-22

## 2024-05-22 NOTE — TELEPHONE ENCOUNTER
Future Appointments   Date Time Provider Department Center   7/23/2024  9:20 AM Myranda Loya MD [facility]   7/23/2024 10:00 AM [facility]      Health Maintenance Due   Topic Date Due    ADVANCE CARE PLANNING  06/22/2023    MAMMO SCREENING  08/20/2023    COLORECTAL CANCER SCREENING  03/26/2024    YEARLY PREVENTIVE VISIT  03/31/2024    BMP  05/26/2024    MICROALBUMIN  05/26/2024     BP Readings from Last 3 Encounters:   02/26/24 124/75   11/09/23 121/70   05/26/23 124/79     Missy was seen today for medication refill.    Diagnoses and all orders for this visit:    Chronic kidney disease, stage 4 (severe) (H)  -     amLODIPine-benazepril (LOTREL) 5-20 MG capsule; TAKE 1 PILL BY MOUTH EVERY DAY/ NOJ IB LUB IB HNUB PAB ZOO NTSHAV SIAB

## 2024-05-29 ENCOUNTER — PATIENT OUTREACH (OUTPATIENT)
Dept: CARE COORDINATION | Facility: CLINIC | Age: 59
End: 2024-05-29
Payer: COMMERCIAL

## 2024-05-29 NOTE — Clinical Note
Pt scheduled for mammogram and AWV on 7-23-24 Patient at work so could not talk long Routed to CC RN to review chart and if CC RN follow up in 2 months after attending AWV on 7-23-24

## 2024-05-29 NOTE — PROGRESS NOTES
5/29/2024  Clinic Care Coordination Contact  Community Health Worker Follow Up    Care Gaps:     Health Maintenance Due   Topic Date Due    ADVANCE CARE PLANNING  06/22/2023    MAMMO SCREENING  08/20/2023    COLORECTAL CANCER SCREENING  03/26/2024    YEARLY PREVENTIVE VISIT  03/31/2024    BMP  05/26/2024    MICROALBUMIN  05/26/2024    HEMOGLOBIN  08/26/2024       Care Gaps Last addressed on 5=29-24, Care Gap Goal set: Yes, and Scheduled 7-23-24 mammogram and AWV      Care Plan:   Care Plan: Yearly Preventative Visit       Problem: HP GENERAL PROBLEM       Goal: I will attend and complete Yearly Preventative visit with the doctor on 7-23-24.       Start Date: 5/29/2024 Expected End Date: 7/31/2024    This Visit's Progress: 90% Recent Progress: 50%    Note:     Barriers: language, access to when next annual wellness visit  Strengths: has MA, motivate to schedule  Patient expressed understanding of goal: yes  Action steps to achieve this goal:  1.I will attend and complete Yearly Preventative visit with the doctor on 7-23-24 at 9am.   Updated 5-29-24 AL                            Care Plan: Mammogram       Problem: HP GENERAL PROBLEM       Goal: I will attend and complete mammogram on 7-23-24 at 9:40am at [facility].       Start Date: 5/29/2024 Expected End Date: 7/31/2024    This Visit's Progress: 90% Recent Progress: 50%    Note:     Barriers: language, access to when next annual wellness visit  Strengths: has MA insurance, motivate to schedule  Patient expressed understanding of goal: yes  Action steps to achieve this goal:  1. I will attend and complete mammogram on 7-23-24 at 9:40am at [facility].  Updated: 5-29-24 AL                            Clinic Care Coordination Contact  Community Health Worker Follow Up    Intervention and Education during outreach:     Samia :  Andreia Barone   ID: [phone]    Called and spoke with patient and follow up on goal(s).  Patient reported:  Already scheduled  mammogram and appt with PCP for annual check up .  appt mammogram and check up on 7-23-24 same day.    Patient at work so could not talk long  Routed to CC RN to review chart and if CC RN follow up in 2 months.    Fariba Laurent  Community Health Worker  [facility] Care Coordination  [email or web address]   Office: [phone]  Fax: [phone]

## 2024-05-30 ENCOUNTER — PATIENT OUTREACH (OUTPATIENT)
Dept: CARE COORDINATION | Facility: CLINIC | Age: 59
End: 2024-05-30
Payer: COMMERCIAL

## 2024-05-30 NOTE — PROGRESS NOTES
Clinic Care Coordination Contact    Situation: Patient chart reviewed by care coordinator.    Background: CHW outreach and spoke with pt   Scheduled AWE     Assessment: Will push next outreach to After AWE     Plan/Recommendations:     Community Health Worker to outreach per standard work and updated on goal progression  RN CC will review in 6-8 weeks to support ongoing recommendations and plan of care will be available sooner if needed.

## 2024-05-30 NOTE — PROGRESS NOTES
5/30/2024  Clinic Care Coordination Contact  Care Team Conversations    Consulted with CC RN change outreach to July 26, 2024  Still active enroll with CCC    CHW Follow up: 2 months  CHW Plan: follow up after AWV, discuss transition to maintenance  CHW Next Follow Up: 7-26-24    Fariba Laurent  Community Health Worker  [facility] Care Coordination  [email or web address]   Office: [phone]  Fax: [phone]         .

## 2024-06-04 ENCOUNTER — TELEPHONE (OUTPATIENT)
Dept: FAMILY MEDICINE | Facility: CLINIC | Age: 59
End: 2024-06-04
Payer: COMMERCIAL

## 2024-06-04 NOTE — TELEPHONE ENCOUNTER
Medication Question or Refill    Contacts         Type Contact Phone/Fax    06/04/2024 04:37 PM CDT Phone (Incoming) Missy Vance (Self) [phone] (M)            What medication are you calling about (include dose and sig)?: unkown    Preferred Pharmacy:   [facility] - [place], [place] - [address]  [address]  [address]  [place] [address]  Phone: [phone] Fax: [phone]      Controlled Substance Agreement on file:   CSA -- Patient Level:    CSA: None found at the patient level.       Who prescribed the medication?: PCP    Do you need a refill? Yes    When did you use the medication last? Last month    Patient offered an appointment? No    Do you have any questions or concerns?  Yes: pt was in at the end of April for a urine infection. She took the medication and the infection got better but now she is having the same symptoms and would like a refill if possible.      Could we send this information to you in [employer] or would you prefer to receive a phone call?:   Patient would like to be contacted via [employer]

## 2024-06-07 ENCOUNTER — TELEPHONE (OUTPATIENT)
Dept: FAMILY MEDICINE | Facility: CLINIC | Age: 59
End: 2024-06-07
Payer: COMMERCIAL

## 2024-06-07 ENCOUNTER — NURSE TRIAGE (OUTPATIENT)
Dept: NURSING | Facility: CLINIC | Age: 59
End: 2024-06-07
Payer: COMMERCIAL

## 2024-06-07 ENCOUNTER — OFFICE VISIT (OUTPATIENT)
Dept: PEDIATRICS | Facility: CLINIC | Age: 59
End: 2024-06-07
Payer: COMMERCIAL

## 2024-06-07 VITALS
HEART RATE: 72 BPM | WEIGHT: 143.4 LBS | OXYGEN SATURATION: 99 % | DIASTOLIC BLOOD PRESSURE: 75 MMHG | TEMPERATURE: 98.1 F | HEIGHT: 59 IN | RESPIRATION RATE: 16 BRPM | BODY MASS INDEX: 28.91 KG/M2 | SYSTOLIC BLOOD PRESSURE: 123 MMHG

## 2024-06-07 DIAGNOSIS — R35.0 URINARY FREQUENCY: ICD-10-CM

## 2024-06-07 DIAGNOSIS — N30.00 ACUTE CYSTITIS WITHOUT HEMATURIA: ICD-10-CM

## 2024-06-07 DIAGNOSIS — R30.0 DYSURIA: Primary | ICD-10-CM

## 2024-06-07 DIAGNOSIS — R10.9 BILATERAL FLANK PAIN: ICD-10-CM

## 2024-06-07 LAB
ALBUMIN UR-MCNC: 100 MG/DL
APPEARANCE UR: ABNORMAL
BACTERIA #/AREA URNS HPF: ABNORMAL /HPF
BILIRUB UR QL STRIP: NEGATIVE
COLOR UR AUTO: YELLOW
GLUCOSE UR STRIP-MCNC: NEGATIVE MG/DL
HGB UR QL STRIP: ABNORMAL
KETONES UR STRIP-MCNC: NEGATIVE MG/DL
LEUKOCYTE ESTERASE UR QL STRIP: ABNORMAL
NITRATE UR QL: POSITIVE
PH UR STRIP: 6 [PH] (ref 5–8)
RBC #/AREA URNS AUTO: ABNORMAL /HPF
SP GR UR STRIP: 1.01 (ref 1–1.03)
SQUAMOUS #/AREA URNS AUTO: ABNORMAL /LPF
UROBILINOGEN UR STRIP-ACNC: 0.2 E.U./DL
WBC #/AREA URNS AUTO: ABNORMAL /HPF
WBC CLUMPS #/AREA URNS HPF: PRESENT /HPF

## 2024-06-07 PROCEDURE — 81001 URINALYSIS AUTO W/SCOPE: CPT

## 2024-06-07 PROCEDURE — 87086 URINE CULTURE/COLONY COUNT: CPT

## 2024-06-07 PROCEDURE — 51798 US URINE CAPACITY MEASURE: CPT

## 2024-06-07 PROCEDURE — 99214 OFFICE O/P EST MOD 30 MIN: CPT | Mod: 25

## 2024-06-07 PROCEDURE — 87186 SC STD MICRODIL/AGAR DIL: CPT

## 2024-06-07 RX ORDER — SULFAMETHOXAZOLE/TRIMETHOPRIM 800-160 MG
1 TABLET ORAL 2 TIMES DAILY
Qty: 14 TABLET | Refills: 0 | Status: SHIPPED | OUTPATIENT
Start: 2024-06-07 | End: 2024-07-23

## 2024-06-07 ASSESSMENT — PAIN SCALES - GENERAL: PAINLEVEL: SEVERE PAIN (7)

## 2024-06-07 NOTE — TELEPHONE ENCOUNTER
General Call    Contacts         Type Contact Phone/Fax    06/07/2024 09:13 AM CDT Phone (Incoming) Missy Vance (Self) [phone] (M)          Reason for Call:  Patient has had a UTI since 6/1/24 and declined an appointment. Would like PCP to prescribe something for her.     What are your questions or concerns:  Possible UTI    Date of last appointment with provider: NA    Could we send this information to you in [facility] or would you prefer to receive a phone call?:   Patient would prefer a phone call   Okay to leave a detailed message?: Yes at Cell number on file:    Telephone Information:   Mobile [phone]

## 2024-06-07 NOTE — TELEPHONE ENCOUNTER
Called patient with --discussed ADS. She is agreeable. Report given to ADS RN & MD.     RN Referral to Acute and Diagnostic Services    [phone] ([address], [address], [place]  [address]       Presenting symptoms/reason for ADS referral:  recurrent/persistent urinary sx--dysuria, frequency, urgency, unable to fully empty bladder    Relevant Medical History:  None    Transition to ADS clinic discussed with patient and patient is agreeable.    Patient has been advised that appointments at ADS typically takes significantly longer than in clinic/urgent care (~ 2.5-3 hours or more):  YES  Patient has transportation and is available for ASAP same day appointment: Yes  Patient aware that ADS will contact them directly YES     Does patient does have claustrophobia No  Patient has contrast allergy No  Patient has PICC line or port in place No       ADS clinic has accepted this patient.    Nohemy Cuenca RN BSN  [facility]

## 2024-06-07 NOTE — PROGRESS NOTES
Acute and Diagnostic Services Clinic Visit    Assessment & Plan     (R30.0) Dysuria  (primary encounter diagnosis)  Comment: Suprapubic pain beginning approximately 6/1/2024.  Bladder scan today after voiding measured minimal urine, approximately 5 mL, excluding distention.  This probably represents the effects of cystitis.  Plan:   UA Macroscopic with reflex to Microscopic and         Culture - Clinic Collect, Urine Microscopic         Exam, Urine Culture.    (R35.0) Urinary frequency  Comment: Frequency and urgency since approximately 6/1/2024.  Plan:   UA Macroscopic with reflex to Microscopic and         Culture - Clinic Collect, Urine Microscopic         Exam, Urine Culture.    (R10.9) Bilateral flank pain  Comment: Symptoms also began approximately 6/1/2024.  Palpation of the muscles and soft tissues of the lower back does not reproduce pain.  Plan:  UA with urine culture reflex to results sent.  If positive for infection, would suggest monitoring her symptoms, including flank pain, for improvement and/for resolution on antibiotics.  Should flank pain fail to improve despite antibiotics, imaging with ultrasound would be warranted.  Because Missy came into ADS today, I offered renal ultrasound now, even before UA results are available, to exclude the possibility of obstruction or hydronephrosis.  Clinically, I do not believe she probably has hydronephrosis, as her symptoms are mild and her clinical status is stable.  She does not wish to undergo renal ultrasound at the present time, though would if symptoms persist after antibiotics.    DISCUSSION/MEDICAL DECISION MAKING:  The UA is notable for significant pyuria and bacteriuria, with positive nitrites and positive leukocyte esterase.  This probably represents cystitis, though with the patient's complaint of bilateral low back pain, I think that we should extend the antibiotic course this time to 7 days in case there is a low-grade pyelonephritis.    (N30.00)  "Acute cystitis without hematuria  Comment: Will choose empiric antibiotics based on UA, then confirm sensitivity on culture when available.  Plan:  TMP/SMX DS 1 p.o. twice daily.  Will treat for 7 days in case low-grade pyelonephritis is present based upon bilateral low back pain.  If Missy's symptoms fail to resolve despite a week of antibiotics, bilateral renal ultrasound may be warranted to exclude obstruction/hydronephrosis.  I asked Missy to contact her PCP if symptoms persist next week.    32  minutes were spent doing chart review, history and exam, documentation and further activities per the note.      BMI  Estimated body mass index is 28.96 kg/m  as calculated from the following:    Height as of this encounter: 1.499 m (4' 11\").    Weight as of this encounter: 65 kg (143 lb 6.4 oz).     Subjective   Missy is a 59 year old, presenting for the following health issues:  UTI    HPI     Genitourinary - Female  Onset/Duration: June 1st  Description:   Painful urination (Dysuria): YES- and burning           Frequency: YES  Blood in urine (Hematuria): No  Delay in urine (Hesitency): no, but only a little comes out.   Intensity: 7/10 now, 10/10 at worst  Progression of Symptoms:  worsening  Accompanying Signs & Symptoms:  Fever/chills: No  Flank pain: YES  Nausea and vomiting: No  Vaginal symptoms: itching  Abdominal/Pelvic Pain: YES- low abdominal  History:   History of frequent UTI s: No, recently had one in April, but that is it.  History of kidney stones: No  Sexually Active: YES  Possibility of pregnancy: No  Precipitating or alleviating factors: None  Therapies tried and outcome: Increase fluid intake and  cranberry tablets     Missy Vance is a 59-year-old woman seen today in the Acute Diagnostic Services (ADS) clinic at [facility] for urinary symptoms.    Missy was seen in a walk-in clinic or urgent care center a few weeks ago for symptoms of UTI, including dysuria and urinary frequency.  " "Those notes are unavailable for review, nor are any labs done during that visit.  Missy says that she was diagnosed as having a UTI, and given a 5-day course of antibiotics which she took to completion.  Due to the absence of records, we cannot confirm that the UA was positive, nor can we confirm that the urine isolates were sensitive to the antibiotic selected.  Her symptoms improved, but only partially and temporarily.  This morning, she contacted her primary care clinic reporting urinary frequency, urgency, urinating in small amounts, and bilateral low back pain.  The primary care provider office contacted us here the ADS requesting a visit.  I spoke to the referring team, and suggested an alternative approach, including outpatient UA, treatment with antibiotics if UA positive, and then ADS evaluation of symptoms should they fail to improve after a course of antibiotics.  However, the provider wanted the patient seen here today.    Missy has suprapubic discomfort, exacerbated by urinating.  She is urinating frequently and very small amounts.  She also has urinary urgency.  She reports bilateral low back pain, describing a bandlike area of discomfort symmetrically over the low back, just above the posterior pelvic brim.  She has not had fever, chills, or sweats.  She denies any prior history of kidney stones.      Review of Systems  As per the history of present illness.  No additional positives or negatives are obtained.      Objective    /75 (BP Location: Right arm, Patient Position: Sitting, Cuff Size: Adult Regular)   Pulse 72   Temp 98.1  F (36.7  C) (Oral)   Resp 16   Ht 1.499 m (4' 11\")   Wt 65 kg (143 lb 6.4 oz)   LMP  (LMP Unknown)   SpO2 99%   BMI 28.96 kg/m    Body mass index is 28.96 kg/m .  Physical Exam   GENERAL: Pleasant woman, neatly dressed in street clothes, who appears well.  RESP: No accessory muscle use.  Lungs clear throughout on inspiration and expiration.  Expiration not " prolonged, no wheeze.  CV: Regular rate and rhythm, non-tachycardic.  Normal S1 S2, no murmur or extra sound.    ABDOMEN: Soft, non-tender, no guarding.  Bowel sounds positive.  MS: No bony deformities noted.  No red or inflamed joints.  SKIN: Warm and dry, no rashes.  NEURO: Alert, oriented, conversant.  Cranial nerves III - XII grossly intact.  No gross motor or sensory deficits.    BACK: Inspection of the low back is normal.  There is no tenderness to palpation of the the lumbar spinous processes.  Palpation of the soft tissues in the paraspinous areas on either side reproduces none of the patient's discomfort.  PSYCH: Calm, alert, conversant.  Able to articulate logical thoughts, no tangential thoughts, no hallucinations or delusions.  Affect normal.      Results for orders placed or performed in visit on 06/07/24 (from the past 24 hour(s))   UA Macroscopic with reflex to Microscopic and Culture - Clinic Collect    Specimen: Urine, Clean Catch   Result Value Ref Range    Color Urine Yellow Colorless, Straw, Light Yellow, Yellow    Appearance Urine Slightly Cloudy (A) Clear    Glucose Urine Negative Negative mg/dL    Bilirubin Urine Negative Negative    Ketones Urine Negative Negative mg/dL    Specific Gravity Urine 1.015 1.005 - 1.030    Blood Urine Small (A) Negative    pH Urine 6.0 5.0 - 8.0    Protein Albumin Urine 100 (A) Negative mg/dL    Urobilinogen Urine 0.2 0.2, 1.0 E.U./dL    Nitrite Urine Positive (A) Negative    Leukocyte Esterase Urine Moderate (A) Negative   Urine Microscopic Exam   Result Value Ref Range    Bacteria Urine Few (A) None Seen /HPF    RBC Urine 5-10 (A) 0-2 /HPF /HPF    WBC Urine  (A) 0-5 /HPF /HPF    Squamous Epithelials Urine Few (A) None Seen /LPF    WBC Clumps Urine Present (A) None Seen /HPF         In 1202  Out 1224  In 1230  Out 1240    Signed Electronically by: Ramiro Jesus MD

## 2024-06-07 NOTE — TELEPHONE ENCOUNTER
Nurse Triage SBAR    Is this a 2nd Level Triage? YES, LICENSED PRACTITIONER REVIEW IS REQUIRED    Situation:   Last few days has had lower abdominal/lower back pain constantly with painful urination (10/10)  Urgency  Frequency  Feels unable to empty bladder    Pt declined appt to be seen, stating her 'insurance deductibles are too much'    Wants PCP to just send an RX for UTI to her pharmacy  Writer explained why Pt really needs to be assessed for this today.    Background:  Pt reports, 'was seen for UTI in UC on or about sometimes in April and took antibiotic for 5 days (or 5 pills?) but symptoms never really went away.'    Assessment:   Denies;  Fever  Nausea/vomiting    Protocol Recommended Disposition:   Go To ED/UCC Now (Or To Office With PCP Approval), See More Appropriate Protocol    Recommendation:   Please advise Pt at [phone] within 1 hour.  A detailed msg may be left on VM    Routed to provider    Does the patient meet one of the following criteria for ADS visit consideration? 16+ years old, with an [facility] PCP     TIP  Providers, please consider if this condition is appropriate for management at one of our Acute and Diagnostic Services sites.     If patient is a good candidate, please use dotphrase <dot>triageresponse and select Refer to ADS to document.    Siomara Bae RN, Nurse Advisor 9:53 AM 6/7/2024  Reason for Disposition   Urinary tract infection suspected, but not taking antibiotics   Unable to urinate (or only a few drops) and bladder feels very full    Additional Information   Negative: Shock suspected (e.g., cold/pale/clammy skin, too weak to stand, low BP, rapid pulse)   Negative: Sounds like a life-threatening emergency to the triager   Negative: Shock suspected (e.g., cold/pale/clammy skin, too weak to stand, low BP, rapid pulse)   Negative: Sounds like a life-threatening emergency to the triager   Negative: Taking antibiotic for urinary tract infection (UTI)    Protocols used: Urinary  Tract Infection on Antibiotic Follow-up Call - Female-A-OH, Urination Pain - Female-A-OH

## 2024-06-07 NOTE — Clinical Note
I had the pleasure of seeing Missy today in the [place] ADS regarding her urinary symptoms.  UA was positive for bacteriuria, pyuria, nitrites, and leukocyte esterase, but was negative for hematuria.  I chose empiric Bactrim DS 1 p.o. twice daily, and decided to extend the treatment course to 7 days in case her bilateral flank pain represents a low-grade pyelonephritis.  If her symptoms fail to improve after antibiotic therapy, she is going to give you a call.  I would think that renal ultrasound might be warranted at that point.  Thank you.

## 2024-06-09 LAB — BACTERIA UR CULT: ABNORMAL

## 2024-06-10 ENCOUNTER — NURSE TRIAGE (OUTPATIENT)
Dept: FAMILY MEDICINE | Facility: CLINIC | Age: 59
End: 2024-06-10
Payer: COMMERCIAL

## 2024-06-10 DIAGNOSIS — N39.0 URINARY TRACT INFECTION WITHOUT HEMATURIA, SITE UNSPECIFIED: Primary | ICD-10-CM

## 2024-06-10 NOTE — TELEPHONE ENCOUNTER
Dr. Loya,    Pt was seen on 6/7/2024 at [facility] for dysuria, was prescribed Sulfamethoxazole-Trimethoprim 800-160 MG . On Saturday, pt starting having vomiting, nausea and dizziness. On Sunday, she started having diarrhea 4 x a day. Today, pt is experiencing abd pain due to the diarrhea. Pt is requesting switching antibiotics.     Debby HORNER RN  [facility]

## 2024-06-11 ENCOUNTER — APPOINTMENT (OUTPATIENT)
Dept: INTERPRETER SERVICES | Facility: CLINIC | Age: 59
End: 2024-06-11
Payer: COMMERCIAL

## 2024-06-11 RX ORDER — CEPHALEXIN 500 MG/1
500 CAPSULE ORAL 2 TIMES DAILY
Qty: 10 CAPSULE | Refills: 0 | Status: SHIPPED | OUTPATIENT
Start: 2024-06-11 | End: 2024-06-16

## 2024-06-11 NOTE — TELEPHONE ENCOUNTER
Called patient with --informed her that Keflex has been sent to pharmacy. She will call back with any further questions/concerns.

## 2024-06-11 NOTE — TELEPHONE ENCOUNTER
"Nurse Triage SBAR    Is this a 2nd Level Triage? YES, LICENSED PRACTITIONER REVIEW IS REQUIRED    Situation: Prescribed Bactrim for UTI 6/7/24, requesting new antibiotic due to nausea, vomiting, diarrhea, and abdominal.    Background: Seen in [place] ADS 6/7/24--urine positive for bacteria.  Started Bactrim Saturday (6/8).  Nausea, vomiting, diarrhea started Sunday (6/9), continued Monday.  Stopped Bactrim after second dose Sunday evening after 4 tablets.    Assessment:   Feeling better today--denies nausea, vomiting, diarrhea, abdominal pain, dizziness.   \"Just feel weak\" today.  Tolerating fluids--has had 16oz water so far.  Tolerating bland foods.  Urinating regularly--last voided 5-8oz about 20 minutes before call.  Denies fever, chills, sweating, rapid breathing, rapid heartbeat.  Reports dysuria, frequency, urgency, and abdominal/back pain associated with ADS visit have resolved.    Protocol Recommended Disposition:   Callback by PCP Today    Recommendation:   Continue pushing fluids--small, frequent sips of water or sports drink.  Continue eating bland foods as tolerated. Resume normal diet if feeling OK after 24-48 hours.  Rest.  Call back with new/worsening symptoms.  Care Team/RN will return call with provider recommendation.     Routed to provider    Does the patient meet one of the following criteria for ADS visit consideration? 16+ years old, with an [facility] PCP     TIP  Providers, please consider if this condition is appropriate for management at one of our Acute and Diagnostic Services sites.     If patient is a good candidate, please use dotphrase <dot>triageresponse and select Refer to ADS to document.     Reason for Disposition   Vomiting a prescription medication    Additional Information   Negative: Shock suspected (e.g., cold/pale/clammy skin, too weak to stand, low BP, rapid pulse)   Negative: Difficult to awaken or acting confused (e.g., disoriented, slurred speech)   Negative: Sounds like a " life-threatening emergency to the triager   Negative: Vomiting occurs only while coughing   Negative: Pregnant < 20 Weeks and nausea/vomiting began in early pregnancy (i.e., 4-8 weeks pregnant)   Negative: Chest pain   Negative: Headache is main symptom   Negative: Vomiting red blood or black (coffee ground) material   Negative: Vomiting and hernia is more painful or swollen than usual   Negative: Recent head injury (within 3 days)   Negative: Recent abdominal injury (within 7 days)   Negative: Insulin-dependent diabetes and glucose > 240 mg/dL (13 mmol/L)   Negative: Severe pain in one eye   Negative: SEVERE vomiting (e.g., 6 or more times/day)  (Exception: Patient sounds well, is drinking liquids, does not sound dehydrated, and vomiting has lasted less than 24 hours.)   Negative: MODERATE vomiting (e.g., 3 - 5 times/day) and age > 60 years   Negative: Constant abdominal pain lasting > 2 hours   Negative: High-risk adult (e.g., brain tumor, V-P shunt, hernia)   Negative: Drinking very little and has signs of dehydration (e.g., no urine > 12 hours, very dry mouth, very lightheaded)   Negative: Patient sounds very sick or weak to the triager   Negative: Vomiting and abdomen looks much more swollen than usual   Negative: Vomiting contains bile (green color)   Negative: Fever > 103 F (39.4 C)   Negative: Fever > 101 F (38.3 C) and over 60 years of age   Negative: Fever > 100.0 F (37.8 C) and has a weak immune system (e.g., HIV positive, cancer chemo, organ transplant, splenectomy, chronic steroids)   Negative: Fever > 100.0 F (37.8 C) and bedridden (e.g., CVA, chronic illness, recovering from surgery)   Negative: Taking any of the following medications: digoxin (Lanoxin), lithium, theophylline, phenytoin (Dilantin)   Negative: SEVERE headache and vomiting   Negative: MILD to MODERATE vomiting (e.g., 1-5 times/day) and lasts > 48 hours (2 days)   Negative: Fever present > 3 days (72 hours)   Negative: Patient wants to  be seen    Protocols used: Vomiting-A-OH

## 2024-06-11 NOTE — TELEPHONE ENCOUNTER
Visit reviewed.   Macrobid preferred but contraindicated due to renal status.   Keflex sent.   She should follow up with PCP re Kidney and gout status. If she can get an earlier appointment then the scheduled one with PCP that would be good.

## 2024-06-30 ENCOUNTER — HEALTH MAINTENANCE LETTER (OUTPATIENT)
Age: 59
End: 2024-06-30

## 2024-07-02 ENCOUNTER — TELEPHONE (OUTPATIENT)
Dept: FAMILY MEDICINE | Facility: CLINIC | Age: 59
End: 2024-07-02
Payer: COMMERCIAL

## 2024-07-02 NOTE — TELEPHONE ENCOUNTER
Patient Quality Outreach    Patient is due for the following:   Breast Cancer Screening - Mammogram    Next Steps:   Schedule a Adult Preventative    Type of outreach:    Sent [employer] message. Closing encounter as I will not be available for follow up until 7/25/24.        Questions for provider review:    None           Andrea Behrend

## 2024-07-23 ENCOUNTER — OFFICE VISIT (OUTPATIENT)
Dept: INTERPRETER SERVICES | Facility: CLINIC | Age: 59
End: 2024-07-23

## 2024-07-23 ENCOUNTER — TELEPHONE (OUTPATIENT)
Dept: FAMILY MEDICINE | Facility: CLINIC | Age: 59
End: 2024-07-23

## 2024-07-23 ENCOUNTER — OFFICE VISIT (OUTPATIENT)
Dept: FAMILY MEDICINE | Facility: CLINIC | Age: 59
End: 2024-07-23
Payer: COMMERCIAL

## 2024-07-23 VITALS
BODY MASS INDEX: 28.63 KG/M2 | OXYGEN SATURATION: 99 % | SYSTOLIC BLOOD PRESSURE: 127 MMHG | DIASTOLIC BLOOD PRESSURE: 78 MMHG | WEIGHT: 142 LBS | TEMPERATURE: 97.2 F | HEIGHT: 59 IN | RESPIRATION RATE: 18 BRPM | HEART RATE: 68 BPM

## 2024-07-23 DIAGNOSIS — N02.B9 IGA NEPHROPATHY: ICD-10-CM

## 2024-07-23 DIAGNOSIS — E79.0 HYPERURICEMIA: ICD-10-CM

## 2024-07-23 DIAGNOSIS — R73.03 PREDIABETES: ICD-10-CM

## 2024-07-23 DIAGNOSIS — R30.0 DYSURIA: ICD-10-CM

## 2024-07-23 DIAGNOSIS — Z00.00 ROUTINE GENERAL MEDICAL EXAMINATION AT A HEALTH CARE FACILITY: Primary | ICD-10-CM

## 2024-07-23 DIAGNOSIS — E78.5 HYPERLIPIDEMIA LDL GOAL <130: ICD-10-CM

## 2024-07-23 DIAGNOSIS — N18.4 CHRONIC KIDNEY DISEASE, STAGE 4 (SEVERE) (H): ICD-10-CM

## 2024-07-23 DIAGNOSIS — N25.81 SECONDARY RENAL HYPERPARATHYROIDISM (H): ICD-10-CM

## 2024-07-23 DIAGNOSIS — I10 HYPERTENSION GOAL BP (BLOOD PRESSURE) < 130/80: ICD-10-CM

## 2024-07-23 LAB
ALBUMIN SERPL BCG-MCNC: 4.4 G/DL (ref 3.5–5.2)
ALBUMIN UR-MCNC: 100 MG/DL
ALP SERPL-CCNC: 103 U/L (ref 40–150)
ALT SERPL W P-5'-P-CCNC: 15 U/L (ref 0–50)
ANION GAP SERPL CALCULATED.3IONS-SCNC: 11 MMOL/L (ref 7–15)
APPEARANCE UR: CLEAR
AST SERPL W P-5'-P-CCNC: 21 U/L (ref 0–45)
BACTERIA #/AREA URNS HPF: ABNORMAL /HPF
BILIRUB DIRECT SERPL-MCNC: <0.2 MG/DL (ref 0–0.3)
BILIRUB SERPL-MCNC: 0.2 MG/DL
BILIRUB UR QL STRIP: NEGATIVE
BUN SERPL-MCNC: 63 MG/DL (ref 6–20)
CALCIUM SERPL-MCNC: 9 MG/DL (ref 8.8–10.4)
CHLORIDE SERPL-SCNC: 108 MMOL/L (ref 98–107)
CHOLEST SERPL-MCNC: 259 MG/DL
CK SERPL-CCNC: 94 U/L (ref 26–192)
COLOR UR AUTO: YELLOW
CREAT SERPL-MCNC: 2.85 MG/DL (ref 0.51–0.95)
CREAT UR-MCNC: 49.6 MG/DL
CYSTATIN C (ROCHE): 2.8 MG/L (ref 0.6–1)
EGFRCR SERPLBLD CKD-EPI 2021: 19 ML/MIN/1.73M2
FASTING STATUS PATIENT QL REPORTED: NO
FASTING STATUS PATIENT QL REPORTED: NO
GFR/BSA.PRED SERPLBLD CYS-BASED-ARV: 19 ML/MIN/1.73M2
GLUCOSE SERPL-MCNC: 92 MG/DL (ref 70–99)
GLUCOSE UR STRIP-MCNC: NEGATIVE MG/DL
HBA1C MFR BLD: 5.7 % (ref 0–5.6)
HCO3 SERPL-SCNC: 19 MMOL/L (ref 22–29)
HDLC SERPL-MCNC: 66 MG/DL
HGB BLD-MCNC: 10.9 G/DL (ref 11.7–15.7)
HGB UR QL STRIP: ABNORMAL
KETONES UR STRIP-MCNC: NEGATIVE MG/DL
LDLC SERPL CALC-MCNC: 177 MG/DL
LEUKOCYTE ESTERASE UR QL STRIP: ABNORMAL
MAGNESIUM SERPL-MCNC: 1.9 MG/DL (ref 1.7–2.3)
MICROALBUMIN UR-MCNC: 393 MG/L
MICROALBUMIN/CREAT UR: 792.34 MG/G CR (ref 0–25)
NITRATE UR QL: NEGATIVE
NONHDLC SERPL-MCNC: 193 MG/DL
PH UR STRIP: 5.5 [PH] (ref 5–8)
PHOSPHATE SERPL-MCNC: 4.6 MG/DL (ref 2.5–4.5)
POTASSIUM SERPL-SCNC: 5.5 MMOL/L (ref 3.4–5.3)
PROT SERPL-MCNC: 8 G/DL (ref 6.4–8.3)
PTH-INTACT SERPL-MCNC: 72 PG/ML (ref 15–65)
RBC #/AREA URNS AUTO: ABNORMAL /HPF
SODIUM SERPL-SCNC: 138 MMOL/L (ref 135–145)
SP GR UR STRIP: 1.01 (ref 1–1.03)
SQUAMOUS #/AREA URNS AUTO: ABNORMAL /LPF
TRIGL SERPL-MCNC: 82 MG/DL
URATE SERPL-MCNC: 9.2 MG/DL (ref 2.4–5.7)
UROBILINOGEN UR STRIP-ACNC: 0.2 E.U./DL
VIT D+METAB SERPL-MCNC: 41 NG/ML (ref 20–50)
WBC #/AREA URNS AUTO: ABNORMAL /HPF

## 2024-07-23 PROCEDURE — 81001 URINALYSIS AUTO W/SCOPE: CPT | Performed by: FAMILY MEDICINE

## 2024-07-23 PROCEDURE — 80061 LIPID PANEL: CPT | Performed by: FAMILY MEDICINE

## 2024-07-23 PROCEDURE — 82306 VITAMIN D 25 HYDROXY: CPT | Performed by: FAMILY MEDICINE

## 2024-07-23 PROCEDURE — 82248 BILIRUBIN DIRECT: CPT | Performed by: FAMILY MEDICINE

## 2024-07-23 PROCEDURE — 84100 ASSAY OF PHOSPHORUS: CPT | Performed by: FAMILY MEDICINE

## 2024-07-23 PROCEDURE — 82570 ASSAY OF URINE CREATININE: CPT | Performed by: FAMILY MEDICINE

## 2024-07-23 PROCEDURE — 99214 OFFICE O/P EST MOD 30 MIN: CPT | Mod: 25 | Performed by: FAMILY MEDICINE

## 2024-07-23 PROCEDURE — 84550 ASSAY OF BLOOD/URIC ACID: CPT | Performed by: FAMILY MEDICINE

## 2024-07-23 PROCEDURE — 83970 ASSAY OF PARATHORMONE: CPT | Performed by: FAMILY MEDICINE

## 2024-07-23 PROCEDURE — 85018 HEMOGLOBIN: CPT | Performed by: FAMILY MEDICINE

## 2024-07-23 PROCEDURE — 36415 COLL VENOUS BLD VENIPUNCTURE: CPT | Performed by: FAMILY MEDICINE

## 2024-07-23 PROCEDURE — T1013 SIGN LANG/ORAL INTERPRETER: HCPCS | Mod: U3

## 2024-07-23 PROCEDURE — 83735 ASSAY OF MAGNESIUM: CPT | Performed by: FAMILY MEDICINE

## 2024-07-23 PROCEDURE — 82043 UR ALBUMIN QUANTITATIVE: CPT | Performed by: FAMILY MEDICINE

## 2024-07-23 PROCEDURE — 83036 HEMOGLOBIN GLYCOSYLATED A1C: CPT | Performed by: FAMILY MEDICINE

## 2024-07-23 PROCEDURE — 99396 PREV VISIT EST AGE 40-64: CPT | Performed by: FAMILY MEDICINE

## 2024-07-23 PROCEDURE — 82610 CYSTATIN C: CPT | Performed by: FAMILY MEDICINE

## 2024-07-23 PROCEDURE — 87086 URINE CULTURE/COLONY COUNT: CPT | Performed by: FAMILY MEDICINE

## 2024-07-23 PROCEDURE — 80053 COMPREHEN METABOLIC PANEL: CPT | Performed by: FAMILY MEDICINE

## 2024-07-23 PROCEDURE — 82550 ASSAY OF CK (CPK): CPT | Performed by: FAMILY MEDICINE

## 2024-07-23 RX ORDER — SULFAMETHOXAZOLE AND TRIMETHOPRIM 400; 80 MG/1; MG/1
1 TABLET ORAL 2 TIMES DAILY
Qty: 14 TABLET | Refills: 0 | Status: SHIPPED | OUTPATIENT
Start: 2024-07-23 | End: 2024-07-30

## 2024-07-23 SDOH — HEALTH STABILITY: PHYSICAL HEALTH: ON AVERAGE, HOW MANY MINUTES DO YOU ENGAGE IN EXERCISE AT THIS LEVEL?: 60 MIN

## 2024-07-23 SDOH — HEALTH STABILITY: PHYSICAL HEALTH: ON AVERAGE, HOW MANY DAYS PER WEEK DO YOU ENGAGE IN MODERATE TO STRENUOUS EXERCISE (LIKE A BRISK WALK)?: 3 DAYS

## 2024-07-23 ASSESSMENT — SOCIAL DETERMINANTS OF HEALTH (SDOH): HOW OFTEN DO YOU GET TOGETHER WITH FRIENDS OR RELATIVES?: NEVER

## 2024-07-23 NOTE — PATIENT INSTRUCTIONS
"Patient Education   Denisha Ayan Xeeb Saib Xyuas Kom Tiv Thaiv Tus Kheej  Nov yog ib co denisha ayan xeeb uas peb yeej meem muab john tib neeg pab lawv noj qab nyob zoo. Rylee zaum koj pab pawg saib xyuas yuav muaj ib co denisha ayan xeeb uas tshwj xeeb john koj nkaus xwb. Thov nrog koj pab pawg saib xyuas sib susan txog rylee yam uas koj toob kierra kom tiv thaiv koj tus kheej.  Janet Coj Lub Neej  Es xaws xais ntau shiraz 150 feeb txhua lub wan tiam (30 feeb txhua hnub, 5 hnub ib lub wan tiam).  Ua yam pab cov leeg muaj zog tuaj 2 zaug txhua lub wan tiam. Rylee yam no pab koj tswj hwm koj lub cev qhov hnyav thiab tiv thaiv ntawm kab mob.  Txhob haus luam yeeb.  Pleev tshuaj tiv thaiv tshav kub kom thiaj tsis raug mob khees xaws ntawm nqaij tawv.  Txhua 2 mus john 5 xyoos yuav tau kuaj koj lub tsev john qhov radon. Radon yog ib deb delilah uas tsis muaj xim tsis muaj ntxhiab uas mob tau koj cov ntsws. Kom thiaj kawm ntxiv, mus saib [email or web address] thiab ntaus ntawv \"Radon in Homes.\"  Ceev cov phom kom tsis muaj mos txwv john hauv thiab muab xauv dangelo hauv ib qho chaw nyab xeeb xws li lub txhoj, los yog muab xauv dangelo thiab muab cov yawm sij zais dangelo. Muab cov mos txwv xauv john hauv lwm qhov chaw nrug cov phom. Kom thiaj kawm ntxiv, mus saib [email or web address] thiab ntaus ntawv \"safe gun storage.\"  Janet Noj Qab Huv  Noj 5 qho txiv hmab txiv ntoo thiab zaub txhua hnub.  Noj nplem nplej, txhuv xim av thiab cov fawm muaj nplej (los theej nplem dawb, txhuv dawb, thiab fawm dawb).  Ua tib zoo noj calcium thiab vitamin D ntau. Saib cov ntawv lo john pob khoom noj thiab siv zog noj kom txog 100% RDA (qhov ntau hauv ib hnub).  Yeej meem kuaj ntsuas  Txhua 6 lub hli mus kuaj hniav thiab muab tu.  Txhua xyoo mus saib koj pab pawg saib xyuas janet noj qab nyob zoo kom sib susan txog:  Ib yam dab tsi uas hloov ntawm koj txoj janet noj qab nyob zoo.  Cov tshuaj uas koj pab pawg tau hais kom siv.  Janet saib xyuas kom tiv thaiv, janet npaj john tsev neeg, thiab yuav ua li brian tiv " thaiv ntawm kab mob uas ntev.  Janet txhaj tshuaj (koob tshuaj tiv thaiv)   Cov koob tshuaj HPV (txog thaum muaj 26 xyoo), yog koj yeej tsis tau txais shiraz li.  Cov koob tshuaj Hepatitis B Kab Mob Siab (txog thaum muaj 59 xyoos), yog koj yeej tsis tau txais shiraz li.  Koob tshuaj COVID-19: Txais koob tshuaj no thaum txog caij txais.  Koob tshuaj tiv thaiv ntawm mob khaub thuas: Txais txhua xyoo.  Koob tshuaj tiv thaiv mob daig tsaig: Txais txhua 10 xyoo.  Pneumococcal, hepatitis A, thiab RSV cov koob tshuaj: Nug koj pab pawg saib xyuas dandre puas tsim nyog john koj txais cov no raws li koj qhov pheej hmoo.  Koob tshuaj tiv thaiv ntawm kab mob sawv hlwv (john cov muaj 50 xyoo rov nikkie).  Janet kuaj ntsuas john janet noj qab nyob zoo  Kuaj mob ntshav qab zib:  Pib thaum muaj 35 xyoos, Mus kuaj mob ntshav qab zib txhua 3 xyoos los yog ntau zog.  Yog koj tseem tsis tau txog 35 xyoos, nug koj pab pawg saib xyuas dandre puas tsim nyog john koj kuaj mob ntshav qab zib.  Kuaj cholesterol: Thaum muaj 39 xyoo, pib kuaj cholesterol txhua 5 xyoos, los yog ntau shiraz ntawd yog kws jonathan mob qhia.  Kuaj txha qhov tuab (DEXA): Thaum txog 50 xyoo lawd, nug koj pab pawg saib xyuas dandre puas tsim nyog john koj kuaj txha dandre puas ruaj.  Kab Mob Siab Hepatitis C: Kuaj ib zaug hauv koj lub neej.  Kuaj Txoj Hlab Ntshav Hauv Plab: Nrog koj tus kws jonathan mob susan txog janet kuaj ntsuas no yog koj:  Tau haus luam yeeb ib zaug li; thiab  Yog txiv neej; thiab  Nruab hnub nyoog 65 thiab 75.  Mob Kierra Cees (kis mob dhau ntawm ajnet sib deev)  Ua ntej muaj 24 xyoos: Nug koj pab pawg saib xyuas dandre puas tsim nyog kuaj john mob kierra cees.  Dre qab muaj 24 xyoos: Mus kuaj john mob kierra cees yog koj muaj janet pheej hmoo. Koj muaj janet pheej hmoo john mob kierra cees (suav nrog HIV) yog:  Koj sib deev nrog ntau shiraz ib tug neeg.  Koj tsis siv cov hnab looj qau thaum sib deev.  Koj los yog koj tus khub deev kuaj pom tias muaj mob kierra cees lawm.  Yog koj muaj janet pheej hmoo john mob kierra cees  HIV, nug txog cov tshuaj PrEP kom tiv thaiv ntawm HIV.  Mus kuaj john mob kierra cees HIV yam ntau shiraz ib zaug hauv koj lub neej, txawm yog koj muaj janet pheej hmoo john mob kierra cees HIV los tsis muaj los xij.  Kuaj john mob khees xaws  Kuaj lub ncauj tsev me nyuam john mob khees xaws: Yog koj muaj ib lub ncauj tsev me nyuam, tk yuav tau ib sij kuaj lub ncauj tsev me nyuam seb puas muaj mob khees xaws pib thaum muaj 21 xyoos. Neeg feem coob uas ib sij kuaj lub ncauj tsev me nyuam thiab tsis pom dab tsi txawv lawv tsum tau octavia qab muaj 65 xyoos. Nrog koj tus kws jonathan mob sib susan txog qhov no.  Kuaj lub mis john mob khees xaws (mammogram): Yog koj tau muaj mis shiraz li, yuav tau ib sij kuaj lub mis pib thaum muaj 40 xyoo. Janet kuaj no yog kom saib seb puas muaj mob khees xaws hauv lub mis.  Kuaj Txoj Hnyuv John Mob Khees Xaws: Yeej tseem ceeb pib kuaj txoj hnyuv john mob khees xaws pib thaum muaj 45 xyoos.  Txhua 10 xyoo yuav tau kuaj txoj hnyuv (los yog ntau zog yog koj muaj janet pheej hmoo) Los sis, nug koj tus kws jonathan mob txog janet kuaj thooj quav FIT txhua xyoo los yog Cologuard txhua 3 xyoos.  Kom thiaj kawm ntxiv txog rylee deb kuaj no, mus saib: [email or web address].  Yog xav tau janet pab txog qhov no, mus saib: bit.ly/[email or web address].  Kuaj lub shweta kua phev (prostate) john mob khees xaws: Yog koj muaj ib lub shweta kua phev (prostate) thiab nruab hnub nyoog 55 mus john 69, nug koj tus kws jonathan mob dandre puas tsim nyog john koj kuaj lub shweta kua phev.  Kuaj ntsws john mob khees xaws: Yog koj haus luam yeeb fernandez sim no los yog tau haus yav tas los uas muaj hnub nyoog nruab 50 xyoos mus john 80 xyoo, nug koj pab pawg saib xyuas dandre puas tsim nyog john koj yeej meem kuaj lub ntsws john mob khees xaws.    John cov deb phiaj janet siv ua ntaub ntawv qhia nkaus xwb. Yuav tsis pauv janet qhia los ntawm koj qhov chaw jonathan mob. Copyright (shila julio)   2023 [facility].   Txhua txoj sumanth raug tswj tseg lawm. Tshaj xyuas los ntawm [facility]. [email or web address] [id number] - REV 04/24.

## 2024-07-23 NOTE — TELEPHONE ENCOUNTER
Attempted to call alternate patient at same address, Missy answered call. Verified identity using street address + . Relayed message from Dr. Loya. Advised patient we may contact her to change antibiotic once culture results are back, but OK to  and start TMP/sulfa RX. , patient verbalizes understanding and agreement.    Tabitha Jarrett RN  [facility]

## 2024-07-23 NOTE — PROGRESS NOTES
"Preventive Care Visit  [facility]  Myranda Loya MD, Family Medicine  Jul 23, 2024      Assessment & Plan     Routine general medical examination at a health care facility    Hypertension goal BP (blood pressure) < 130/80  Blood pressure at goal.    Hyperlipidemia LDL goal <130  Recheck LDL - last was 204 on 2/26/24. Having difficulty remembering meds. [place] referral made for possible blister packs.   - CK total  - Lipid panel reflex to direct LDL Fasting  - Bilirubin direct    Chronic kidney disease, stage 4 (severe) (H)  Check labs.  Nephrology referral made ( has CKD5 and sees nephrology consultants).  - Uric acid  - Comprehensive metabolic panel  - Cystatin C with GFR  - Vitamin D deficiency screening  - Parathyroid Hormone Intact  - Magnesium  - Phosphorus  - Albumin Random Urine Quantitative with Creat Ratio  - Hemoglobin    Prediabetes  Stable  - Hemoglobin A1c; Future  - Hemoglobin A1c    IgA nephropathy    Hyperuricemia  - Uric acid; Future    Dysuria  Recent UTI. Symptoms improved with treatment, but then returned.   - UA with Microscopic - lab collect; Future  - Urine Culture; Future  - UA with Microscopic - lab collect  - Urine Culture  - Urine Microscopic Exam  - sulfamethoxazole-trimethoprim (BACTRIM) 400-80 MG tablet; Take 1 tablet by mouth 2 times daily for 7 days    The longitudinal plan of care for the diagnosis(es)/condition(s) as documented were addressed during this visit. Due to the added complexity in care, I will continue to support Missy in the subsequent management and with ongoing continuity of care.         BMI  Estimated body mass index is 28.66 kg/m  as calculated from the following:    Height as of this encounter: 1.499 m (4' 11.02\").    Weight as of this encounter: 64.4 kg (142 lb).   Weight management plan: Discussed healthy diet and exercise guidelines    Counseling  Appropriate preventive services were addressed with this patient via screening, " questionnaire, or discussion as appropriate for fall prevention, nutrition, physical activity, Tobacco-use cessation, weight loss and cognition.  Checklist reviewing preventive services available has been given to the patient.  Reviewed patient's diet, addressing concerns and/or questions.   She is at risk for lack of exercise and has been provided with information to increase physical activity for the benefit of her well-being.   Patient is at risk for social isolation and has been provided with information about the benefit of social connection.   The patient was instructed to see the dentist every 6 months.   She is at risk for psychosocial distress and has been provided with information to reduce risk.         Carolynn Louis is a 59 year old, presenting for the following:  Physical and Follow Up (UTI)  Burning and itching with urination - small amounts of urine. Since may.  No vaginal symptoms.    Has cologuard at home. Waiting until she has insurance first. Has $2000 deductible before it will cover.      7/23/2024     8:55 AM   Additional Questions   Roomed by Gabe SOLIS   Accompanied by         Health Care Directive  Patient does not have a Health Care Directive or Living Will: Discussed advance care planning with patient; however, patient declined at this time.    HPI        7/23/2024   General Health   How would you rate your overall physical health? Good   Feel stress (tense, anxious, or unable to sleep) Only a little      (!) STRESS CONCERN      7/23/2024   Nutrition   Three or more servings of calcium each day? Yes   Diet: Regular (no restrictions)   How many servings of fruit and vegetables per day? (!) 2-3   How many sweetened beverages each day? 0-1            7/23/2024   Exercise   Days per week of moderate/strenous exercise 3 days   Average minutes spent exercising at this level 60 min            7/23/2024   Social Factors   Frequency of gathering with friends or relatives Never   Worry food  won't last until get money to buy more No   Food not last or not have enough money for food? No   Do you have housing? (Housing is defined as stable permanent housing and does not include staying ouside in a car, in a tent, in an abandoned building, in an overnight shelter, or couch-surfing.) Yes   Are you worried about losing your housing? No   Lack of transportation? Yes   Unable to get utilities (heat,electricity)? No       (!) TRANSPORTATION CONCERN PRESENT(!) SOCIAL CONNECTIONS CONCERN      7/23/2024   Fall Risk   Fallen 2 or more times in the past year? No   Trouble with walking or balance? No             7/23/2024   Dental   Dentist two times every year? (!) NO                 Today's PHQ-2 Score:       2/26/2024     8:07 AM   PHQ-2 ( 1999 [employer])   Q1: Little interest or pleasure in doing things 0   Q2: Feeling down, depressed or hopeless 0   PHQ-2 Score 0   Q1: Little interest or pleasure in doing things Not at all   Q2: Feeling down, depressed or hopeless Not at all   PHQ-2 Score 0         7/23/2024   Substance Use   Alcohol more than 3/day or more than 7/wk Not Applicable   Do you use any other substances recreationally? No        Social History     Tobacco Use    Smoking status: Never     Passive exposure: Never    Smokeless tobacco: Never    Tobacco comments:     no passive exposure   Vaping Use    Vaping status: Never Used   Substance Use Topics    Alcohol use: No    Drug use: No           8/20/2021   LAST FHS-7 RESULTS   1st degree relative breast or ovarian cancer No   Any relative bilateral breast cancer No   Any male have breast cancer No   Any ONE woman have BOTH breast AND ovarian cancer No   Any woman with breast cancer before 50yrs No   2 or more relatives with breast AND/OR ovarian cancer No   2 or more relatives with breast AND/OR bowel cancer No                   7/23/2024   STI Screening   New sexual partner(s) since last STI/HIV test? No         Latest Ref Rng & Units 1/17/2020     3:53 PM  "  PAP / HPV   PAP Negative for squamous intraepithelial lesion or malignancy. Negative for squamous intraepithelial lesion or malignancy  Electronically signed by Zita Robles CT (ASCP) on 1/24/2020 at  2:27 PM      HPV 16 DNA NEG Negative    HPV 18 DNA NEG Negative    Other HR HPV NEG Negative      ASCVD Risk   The 10-year ASCVD risk score (Timo ESCAMILLA, et al., 2019) is: 3.9%    Values used to calculate the score:      Age: 59 years      Sex: Female      Is Non- : No      Diabetic: No      Tobacco smoker: No      Systolic Blood Pressure: 127 mmHg      Is BP treated: Yes      HDL Cholesterol: 66 mg/dL      Total Cholesterol: 289 mg/dL         Reviewed and updated as needed this visit by Provider                       Objective    Exam  /78 (BP Location: Left arm, Patient Position: Sitting, Cuff Size: Adult Large)   Pulse 68   Temp 97.2  F (36.2  C) (Temporal)   Resp 18   Ht 1.499 m (4' 11.02\")   Wt 64.4 kg (142 lb)   LMP  (LMP Unknown)   SpO2 99%   BMI 28.66 kg/m     Estimated body mass index is 28.66 kg/m  as calculated from the following:    Height as of this encounter: 1.499 m (4' 11.02\").    Weight as of this encounter: 64.4 kg (142 lb).    Physical Exam  GENERAL: alert and no distress  EYES: Eyes grossly normal to inspection, PERRL and conjunctivae and sclerae normal  HENT: ear canals and TM's normal, nose and mouth without ulcers or lesions  NECK: no adenopathy, no asymmetry, masses, or scars  RESP: lungs clear to auscultation - no rales, rhonchi or wheezes  CV: regular rate and rhythm, normal S1 S2, no S3 or S4, no murmur, click or rub, no peripheral edema  ABDOMEN: soft, nontender, no hepatosplenomegaly, no masses and bowel sounds normal  MS: no gross musculoskeletal defects noted, no edema  SKIN: no suspicious lesions or rashes  NEURO: Normal strength and tone, mentation intact and speech normal  PSYCH: mentation appears normal, affect " normal/bright    Signed Electronically by: Myranda Loya MD      Prior to immunization administration, verified patients identity using patient s name and date of birth. Please see Immunization Activity for additional information.     Screening Questionnaire for Adult Immunization    Are you sick today?   No   Do you have allergies to medications, food, a vaccine component or latex?   Yes   Have you ever had a serious reaction after receiving a vaccination?   No   Do you have a long-term health problem with heart, lung, kidney, or metabolic disease (e.g., diabetes), asthma, a blood disorder, no spleen, complement component deficiency, a cochlear implant, or a spinal fluid leak?  Are you on long-term aspirin therapy?   No   Do you have cancer, leukemia, HIV/AIDS, or any other immune system problem?   No   Do you have a parent, brother, or sister with an immune system problem?   Yes   In the past 3 months, have you taken medications that affect  your immune system, such as prednisone, other steroids, or anticancer drugs; drugs for the treatment of rheumatoid arthritis, Crohn s disease, or psoriasis; or have you had radiation treatments?   No   Have you had a seizure, or a brain or other nervous system problem?   No   During the past year, have you received a transfusion of blood or blood    products, or been given immune (gamma) globulin or antiviral drug?   No   For women: Are you pregnant or is there a chance you could become       pregnant during the next month?   No   Have you received any vaccinations in the past 4 weeks?   No     Immunization questionnaire was positive for at least one answer.  Notified provider.      Patient instructed to remain in clinic for 15 minutes afterwards, and to report any adverse reactions.     Screening performed by Gabe Andre MA on 7/23/2024 at 9:06 AM.

## 2024-07-23 NOTE — TELEPHONE ENCOUNTER
First attempt: LVM for patient to return call to clinic, ask to speak with available triage nurse. Please relay message from Dr. Loya upon return call.    doreen : Edna Jarertt RN  [facility]

## 2024-07-23 NOTE — TELEPHONE ENCOUNTER
----- Message from Myranda Loya sent at 7/23/2024  1:58 PM CDT -----  Please let Missy know:  Her urine shows possible urine infection. I ordered a urine culture. But because she is having symptoms, I would like her to start TMP/sulfa twice daily for one week. Prescription was sent in.

## 2024-07-24 ENCOUNTER — TELEPHONE (OUTPATIENT)
Dept: FAMILY MEDICINE | Facility: CLINIC | Age: 59
End: 2024-07-24
Payer: COMMERCIAL

## 2024-07-24 LAB — BACTERIA UR CULT: NORMAL

## 2024-07-24 NOTE — TELEPHONE ENCOUNTER
[facility] referral from: [facility] visit (referral by provider)    [facility] referral outreach attempt #1 on July 24, 2024 at 11:41 AM      Outcome: Spoke with patient and she said the referral was just suppose to be for her  only.     Use bcbs oos for the carrier/Plan on the flowsheet          See Abisai  [facility]   [phone]

## 2024-07-26 ENCOUNTER — PATIENT OUTREACH (OUTPATIENT)
Dept: CARE COORDINATION | Facility: CLINIC | Age: 59
End: 2024-07-26
Payer: COMMERCIAL

## 2024-07-26 NOTE — PROGRESS NOTES
7/26/2024  Clinic Care Coordination Contact  Community Health Worker Follow Up    Care Gaps:     Health Maintenance Due   Topic Date Due    MAMMO SCREENING  08/20/2023    COLORECTAL CANCER SCREENING  03/26/2024    YEARLY PREVENTIVE VISIT  03/31/2024       Care Gaps Last addressed on 7-26-24, Care Gap Goal set: Yes, and Requested a  from Care Team to call patient.    Care Plan:   Care Plan: 1. FRW Goal: Obtain medical insurance completed 4-12-24 Completed 4/12/2024      Problem: Lack of medical insurance  Resolved 4/12/2024      Goal: I have health insurance through my workplace.  Completed 4/12/2024      Start Date: 3/15/2024 Expected End Date: 6/15/2024    This Visit's Progress: 100%    Note:     Strengths:  patient is enrolled with our Financial Resource Worker  Barriers: patients MA with [facility] ended 2/29/24  Patient expressed understanding of goal: yes  Action steps to achieve this goal:  Per FRW note patient not eligible for MA due to over income but has insurance through workplace.                                     Care Plan: Yearly Preventative Visit completed 7-23-24 Completed 7/26/2024      Problem: HP GENERAL PROBLEM  Resolved 7/26/2024      Goal: I attended and completed Yearly Preventative visit with the doctor on 7-23-24.  Completed 7/26/2024      Start Date: 5/29/2024 Expected End Date: 7/31/2024    This Visit's Progress: 100% Recent Progress: 90%    Note:     Barriers: language, access to when next annual wellness visit  Strengths: has MA, motivate to schedule  Patient expressed understanding of goal: yes  Action steps to achieve this goal:  I will continue to  schedule and complete Yearly Preventative visit with the doctor as scheduled.  [facility] [address] [place] [address]  [phone]  24/7 Care Connection to connect with Triage nurse [phone]    [address]  [address]  [phone]  Walk-in Care Hours   Monday - Friday, 7:00 a.m. to 7:00 p.m.   Saturday & Sunday, 8:00 a.m. to 4:00 p.m                                Care Plan: Mammogram       Problem: HP GENERAL PROBLEM       Goal: I will reschedule mammogram at [facility]. within 1-3 months       Start Date: 7/26/2024 Expected End Date: 10/31/2024    This Visit's Progress: 40% Recent Progress: 90%    Note:     Barriers: language, access to when next annual wellness visit  Strengths: has MA insurance, motivate to schedule  Patient expressed understanding of goal: yes  Action steps to achieve this goal:  1. cancel mammogram on 7-23-24 at 9:40am at [facility].  Updated: 7-26-24 Critical [facility] hospital Care Coordination Contact  Community Health Worker Follow Up    Intervention and Education during outreach:     Called and spoke with patient  and follow up on goal(s).  Patient reported:  -attended check up on 7-23-24 Goal completed.  -mammogram canceled 7-23-24 will reschedule mammogram.    Patient works until 3:30pn best to call after 4pm   Scheduled follow up with CC RN on 7-29-24 at 4pm to support with plan of care after AWV on 7-23-24 and assess needs/goals or appropriate to transition to maintenance.      CC RN on 7-29-24 at 4pm   CHW Follow up: Monthly  CHW Plan: Follow up on goal (s)  CHW Next Follow Up: 9-9-24    Fariba Laurent  Community Health Worker  [facility] Care Coordination  [email or web address]  [email or web address]   Office: [phone]  Fax: [phone]

## 2024-07-26 NOTE — Clinical Note
Review note Scheduled follow up with CC RN on 7-29-24 at 4pm to support with plan of care after AWV on 7-23-24 and assess needs/goals or appropriate to transition to maintenance.

## 2024-07-29 ENCOUNTER — PATIENT OUTREACH (OUTPATIENT)
Dept: NURSING | Facility: CLINIC | Age: 59
End: 2024-07-29
Payer: COMMERCIAL

## 2024-07-29 NOTE — PROGRESS NOTES
Clinic Care Coordination Contact  [facility]/Voicemail    Clinical Data: Care Coordinator Outreach        Left message on patient's voicemail with call back information and requested return call.    Plan: .Community Health Worker to outreach per standard work and updated on goal progression  RN CC will review in 4-6 weeks to support ongoing recommendations and plan of care will be available sooner if needed.

## 2024-08-01 ENCOUNTER — TELEPHONE (OUTPATIENT)
Dept: FAMILY MEDICINE | Facility: CLINIC | Age: 59
End: 2024-08-01
Payer: COMMERCIAL

## 2024-08-01 ENCOUNTER — APPOINTMENT (OUTPATIENT)
Dept: INTERPRETER SERVICES | Facility: CLINIC | Age: 59
End: 2024-08-01
Payer: COMMERCIAL

## 2024-08-01 PROBLEM — E79.0 HYPERURICEMIA: Status: ACTIVE | Noted: 2024-08-01

## 2024-08-01 PROBLEM — N25.81 SECONDARY RENAL HYPERPARATHYROIDISM (H): Status: ACTIVE | Noted: 2024-08-01

## 2024-08-01 NOTE — LETTER
August 6, 2024      Missy CASTILLO Vance  [address] [address]        Dear WILL Louis [facility] has made multiple attempts to reach you regarding a message from Dr. Loya.     You have hyperparathyroidism, or overactive PARAthyroid glands. This can cause things like frequent urination and body pains. The treatment is to modify her diet:  EAT MORE: fruit, vegetables, whole grains.  EAT LESS: meat, dairy, fast food, pop, and junk food     You also have high uric acid. This can cause gout. Changing her diet (as above) will help.   TAKE allopurinol 300 mg daily.     Cholesterol is better, but still too high.  Eat less animal fat.   TAKE rosuvastatin every day.     Kidney function is low, but stable. Recheck in one month.  You do not have a urine infection.  Blood sugars are OK - still in prediabetes range.     If you are having any problems taking medicines, please let Dr. Loya know. Otherwise, we'll plan to recheck labs in one month and see her in November. Schedule lab visit for 1 month.    Sincerely,    Jacinto RICHARDS RN / Myranda Loya MD

## 2024-08-01 NOTE — RESULT ENCOUNTER NOTE
Please let Missy know:    She has hyperparathyroidism, or overactive PARAthyroid glands. This can cause things like frequent urination and body pains. The treatment is to modify her diet:  EAT MORE: fruit, vegetables, whole grains.  EAT LESS: meat, dairy, fast food, pop, and junk food    She has high uric acid. This can cause gout. Changing her diet (as above) will help.   TAKE allopurinol 300 mg daily.    Cholesterol is better, but still too high.  Eat less animal fat.   TAKE rosuvastatin every day.    Kidney function is low, but stable. Recheck in one month.  She does not have a urine infection.  Blood sugars are OK - still in prediabetes range.    If she's having any problems taking medicines, let me know. Otherwise, we'll plan to recheck labs in one month and see her in November. Schedule lab visit for 1 month.    Future Appointments  11/1/2024  9:20 AM    Myranda Loya MD         [facility]              [facility]

## 2024-08-01 NOTE — TELEPHONE ENCOUNTER
Called pt with help from a [employer] . Left message for pt to call clinic back.      Juan Carlos Wilson Cem Say, BSN RN  [facility]        ----- Message from Myranda Loya sent at 8/1/2024  3:41 PM CDT -----  Please let Missy know:    She has hyperparathyroidism, or overactive PARAthyroid glands. This can cause things like frequent urination and body pains. The treatment is to modify her diet:  EAT MORE: fruit, vegetables, whole grains.  EAT LESS: meat, dairy, fast food, pop, and junk food    She has high uric acid. This can cause gout. Changing her diet (as above) will help.   TAKE allopurinol 300 mg daily.    Cholesterol is better, but still too high.  Eat less animal fat.   TAKE rosuvastatin every day.    Kidney function is low, but stable. Recheck in one month.  She does not have a urine infection.  Blood sugars are OK - still in prediabetes range.    If she's having any problems taking medicines, let me know. Otherwise, we'll plan to recheck labs in one month and see her in November. Schedule lab visit for 1 month.    Future Appointments  11/1/2024  9:20 AM    Myranda Loya MD         [facility]

## 2024-08-05 ENCOUNTER — APPOINTMENT (OUTPATIENT)
Dept: INTERPRETER SERVICES | Facility: CLINIC | Age: 59
End: 2024-08-05
Payer: COMMERCIAL

## 2024-08-05 NOTE — TELEPHONE ENCOUNTER
Second attempt: No answer. LVM on mobile phone. Son picked up home phone but has no CTC. Son will tell his mother to return call to clinic. Please relay clinician's message below upon return call.    Jacinto RICHARDS RN  [facility]

## 2024-08-05 NOTE — LETTER
August 5, 2024  Re: Missy Vance  YOB: 1965    Dear Colleague,    Thank you for your referral to the [facility]. We have been unable to schedule the referral after several contact attempts.      If you have any questions or concerns, please contact our office at Dept: [phone].        Sincerely,  [facility]

## 2024-08-06 ENCOUNTER — TELEPHONE (OUTPATIENT)
Dept: FAMILY MEDICINE | Facility: CLINIC | Age: 59
End: 2024-08-06
Payer: COMMERCIAL

## 2024-08-06 DIAGNOSIS — E78.5 HYPERLIPIDEMIA LDL GOAL <130: Primary | ICD-10-CM

## 2024-08-06 DIAGNOSIS — Z78.9 STATIN INTOLERANCE: ICD-10-CM

## 2024-08-06 NOTE — TELEPHONE ENCOUNTER
"   Addendum          ----- Message from Myranda WILL Loya sent at 8/1/2024  3:41 PM CDT -----  Please let Missy know:     She has hyperparathyroidism, or overactive PARAthyroid glands. This can cause things like frequent urination and body pains. The treatment is to modify her diet:  EAT MORE: fruit, vegetables, whole grains.  EAT LESS: meat, dairy, fast food, pop, and junk food     She has high uric acid. This can cause gout. Changing her diet (as above) will help.   TAKE allopurinol 300 mg daily.     Cholesterol is better, but still too high.  Eat less animal fat.   TAKE rosuvastatin every day.     Kidney function is low, but stable. Recheck in one month.  She does not have a urine infection.  Blood sugars are OK - still in prediabetes range.     If she's having any problems taking medicines, let me know. Otherwise, we'll plan to recheck labs in one month and see her in November. Schedule lab visit for 1 month.          Patient returned call with assistance of an .  Lab only appt assisted.   Patient requested an alternative replacement of the Rosuvastatin as this medication has given her \"foot pain\".  Patient stopped this medication and the foot pain went away.    Will route encounter to provider for an update.  Please see a new encounter message.    Meet Andre RN  [facility]                    "

## 2024-08-06 NOTE — TELEPHONE ENCOUNTER
Third attempt: Patient is not available. Airam son picked up home phone but has no CTC. Son will tell his mother to return call to clinic. Please relay clinician's message below upon return call. Letter sent to patient.     Jacinto RICHARDS RN  [facility]

## 2024-08-06 NOTE — TELEPHONE ENCOUNTER
"Patient is requesting an alternative replacement of the Rosuvastatin treatment for cholesterol.  Patient reported have not been consistent to this medication as it has given her \"foot pain\".  Foot pain went away when patient stopped taking medication.    Will route encounter to provider for advice.    Meet Andre RN  [facility]    "

## 2024-08-07 ENCOUNTER — APPOINTMENT (OUTPATIENT)
Dept: INTERPRETER SERVICES | Facility: CLINIC | Age: 59
End: 2024-08-07
Payer: COMMERCIAL

## 2024-08-09 RX ORDER — PRAVASTATIN SODIUM 10 MG
10 TABLET ORAL DAILY
Qty: 90 TABLET | Refills: 4 | Status: SHIPPED | OUTPATIENT
Start: 2024-08-09 | End: 2024-08-15 | Stop reason: SINTOL

## 2024-08-09 NOTE — TELEPHONE ENCOUNTER
Stop rosuvastatin 10 milligrams due to foot pain.  Start pravastatin 10 mg daily.  Plan to recheck lipids at next visit.

## 2024-08-14 NOTE — TELEPHONE ENCOUNTER
"RN called with a [employer] . Relayed clinician's message below.    \"Stop rosuvastatin 10 milligrams due to foot pain.  Start pravastatin 10 mg daily.  Plan to recheck lipids at next visit.\"  Dr. Loya    Patient verbalized understanding to stop rosuvastatin 10 mg due to foot pain. Patient also reported that she used to take pravastatin 10 mg in 2023 and stopped due to foot pain.    Dr Loya,    Please advise.    Jacinto RICHARDS RN  [facility]     "

## 2024-08-15 RX ORDER — EZETIMIBE 10 MG/1
10 TABLET ORAL DAILY
Qty: 90 TABLET | Refills: 4 | Status: SHIPPED | OUTPATIENT
Start: 2024-08-15 | End: 2024-09-05 | Stop reason: SINTOL

## 2024-08-15 NOTE — TELEPHONE ENCOUNTER
Called pt with help from a [facility] . Left message for pt to call clinic back. Please relay Dr. Loya's message when pt calls back.      Juan Carlos Wilson Cem Say, BSN RN  [facility]

## 2024-08-15 NOTE — TELEPHONE ENCOUNTER
I sent in a different medication, called ezetimibe.    It is NOT related to the statins and does NOT cause leg pain.     STOP all statins (added to allergy list as side effect)  Start ezetimibe daily.    Future Appointments   Date Time Provider Department Center   8/30/2024  4:15 PM [facility]   11/1/2024  9:20 AM [facility]

## 2024-08-20 ENCOUNTER — APPOINTMENT (OUTPATIENT)
Dept: INTERPRETER SERVICES | Facility: CLINIC | Age: 59
End: 2024-08-20
Payer: COMMERCIAL

## 2024-08-20 NOTE — TELEPHONE ENCOUNTER
Second attempt: LVM requesting patient return call to clinic. Please relay message from Dr. Loya upon return call.    [facility] : Andreia Jarrett RN  [facility]

## 2024-08-21 NOTE — TELEPHONE ENCOUNTER
3rd attempt. LM on   relaying clinician's message and recommendations. Also spoke to guerita Alegria and relayed message.     Debby HORNER RN  [facility]        Myranda Loya MD Physician Signed8/15/2024       I sent in a different medication, called ezetimibe.     It is NOT related to the statins and does NOT cause leg pain.      STOP all statins (added to allergy list as side effect)  Start ezetimibe daily.            Future Appointments   Date Time Provider Department Center   8/30/2024  4:15 PM [facility]   11/1/2024  9:20 AM Myranda Loya MD [facility]

## 2024-08-30 ENCOUNTER — PATIENT OUTREACH (OUTPATIENT)
Dept: CARE COORDINATION | Facility: CLINIC | Age: 59
End: 2024-08-30

## 2024-08-30 ENCOUNTER — LAB (OUTPATIENT)
Dept: LAB | Facility: CLINIC | Age: 59
End: 2024-08-30
Payer: COMMERCIAL

## 2024-08-30 DIAGNOSIS — N18.4 CHRONIC KIDNEY DISEASE, STAGE 4 (SEVERE) (H): ICD-10-CM

## 2024-08-30 DIAGNOSIS — N25.81 SECONDARY RENAL HYPERPARATHYROIDISM (H): ICD-10-CM

## 2024-08-30 LAB
ALBUMIN SERPL BCG-MCNC: 4.3 G/DL (ref 3.5–5.2)
ANION GAP SERPL CALCULATED.3IONS-SCNC: 13 MMOL/L (ref 7–15)
BUN SERPL-MCNC: 57.5 MG/DL (ref 6–20)
CALCIUM SERPL-MCNC: 8.7 MG/DL (ref 8.8–10.4)
CHLORIDE SERPL-SCNC: 110 MMOL/L (ref 98–107)
CREAT SERPL-MCNC: 2.79 MG/DL (ref 0.51–0.95)
EGFRCR SERPLBLD CKD-EPI 2021: 19 ML/MIN/1.73M2
GLUCOSE SERPL-MCNC: 86 MG/DL (ref 70–99)
HCO3 SERPL-SCNC: 17 MMOL/L (ref 22–29)
PHOSPHATE SERPL-MCNC: 4.7 MG/DL (ref 2.5–4.5)
POTASSIUM SERPL-SCNC: 5.3 MMOL/L (ref 3.4–5.3)
SODIUM SERPL-SCNC: 140 MMOL/L (ref 135–145)
URATE SERPL-MCNC: 8.3 MG/DL (ref 2.4–5.7)

## 2024-08-30 PROCEDURE — 84550 ASSAY OF BLOOD/URIC ACID: CPT

## 2024-08-30 PROCEDURE — 80069 RENAL FUNCTION PANEL: CPT

## 2024-08-30 PROCEDURE — 36415 COLL VENOUS BLD VENIPUNCTURE: CPT

## 2024-08-30 NOTE — PROGRESS NOTES
Clinic Care Coordination Contact  [facility]/Voicemail    Chart review notes care team attempt to reach pt       Clinical Data: Care Coordinator Outreach         Left message on patient's voicemail with call back information and requested return call.    Plan: Community Health Worker to outreach per standard work and updated on goal progression  RN CC will review in 4-6 weeks to support ongoing recommendations and plan of care will be available sooner if needed.

## 2024-09-04 ENCOUNTER — NURSE TRIAGE (OUTPATIENT)
Dept: FAMILY MEDICINE | Facility: CLINIC | Age: 59
End: 2024-09-04
Payer: COMMERCIAL

## 2024-09-04 NOTE — TELEPHONE ENCOUNTER
"Nurse Triage SBAR    Is this a 2nd Level Triage? YES, LICENSED PRACTITIONER REVIEW IS REQUIRED    Situation: Back pain/burning    Background: History of chronic kdiney disease    Assessment: Patient calls with Samia .  ID [id number]. Patient reports back pain/burning starting around 8/24/24. Patient describes her back as \"hot\", like she is near a heater. States at times, her back pain is a 8 to 10 on a scale of 0 to 10 (0 being no pain, 10 being the worst pain). Pain and burning starts in her lower back and goes up to her shoulders. Patient states if she focuses on her activities, the burning and pain will go away for awhile. Patient reports if she drinks a lot of water, the pain improves. Pain while talking on the phone is rated as a 5 or 6, states the burning or \"hot\" sensation is present more than pain. Patient states she is urinating less frequently with the pain. Denies blood in urine or burning with urination. Denies fever, reports chills at night. Takes Tylenol for the chills. Reports deep pain in the back at night.     Additionally, patient states she stopped taking the Zetia around 8/24/24. States this caused constipation, lack of appetite, and bloating. Patient reports have two bowel movements today which is her normal. Denies abdominal pain.     Protocol Recommended Disposition:   See in Office Today or Tomorrow    Recommendation: Offered appointment tomorrow. Patient declines, states she has to work. Recommended patient be evaluated in Urgent Care for her symptoms. Patient declines and states she will wait until her scheduled appointment 9/6/24. Emphasized importance of going to Urgent Care or the Emergency Department if she develops a fever, numbness, weakness, urinary pain, or pain shooting into her leg. Patient verbalized understanding. Notified patient that PCP would be notified and asked for any additional recommendations.     Routed to provider    Does the patient meet one of the " following criteria for ADS visit consideration? 16+ years old, with an [facility] PCP     TIP  Providers, please consider if this condition is appropriate for management at one of our Acute and Diagnostic Services sites.     If patient is a good candidate, please use dotphrase <dot>triageresponse and select Refer to ADS to document.       Reason for Disposition   Age > 50 and no history of prior similar back pain    Additional Information   Negative: SEVERE back pain of sudden onset and age > 60 years   Negative: SEVERE abdominal pain (e.g., excruciating)   Negative: Abdominal pain and age > 60 years   Negative: Passed out (i.e., fainted, collapsed and was not responding)   Negative: Shock suspected (e.g., cold/pale/clammy skin, too weak to stand, low BP, rapid pulse)   Negative: Sounds like a life-threatening emergency to the triager   Negative: Major injury to the back (e.g., MVA, fall > 10 feet or 3 meters, penetrating injury, etc.)   Negative: Pain in the upper back over the ribs (rib cage) that radiates (travels) into the chest   Negative: Pain in the upper back over the ribs (rib cage) and worsened by coughing (or clearly increases with breathing)   Negative: Back pain during pregnancy   Negative: Unable to urinate (or only a few drops) and bladder feels very full   Negative: Loss of bladder or bowel control (urine or bowel incontinence; wetting self, leaking stool) of new-onset   Negative: Numbness (loss of sensation) in groin or rectal area   Negative: Pain radiates into groin, scrotum   Negative: Blood in urine (red, pink, or tea-colored)   Negative: Vomiting and pain over lower ribs of back (i.e., flank - kidney area)   Negative: Weakness of a leg or foot (e.g., unable to bear weight, dragging foot)   Negative: Patient sounds very sick or weak to the triager   Negative: Fever > 100.4 F (38.0 C) and flank pain   Negative: Pain or burning with passing urine (urination)   Negative: Numbness in an arm or hand  (i.e., loss of sensation) and upper back pain   Negative: Numbness in a leg or foot (i.e., loss of sensation)   Negative: High-risk adult (e.g., history of cancer, history of HIV, or history of IV Drug Use)   Negative: Soft tissue infection (e.g., abscess, cellulitis) or other serious infection (e.g., bacteremia) in last 2 weeks   Negative: Painful rash with multiple small blisters grouped together (i.e., dermatomal distribution or 'band' or 'stripe')   Negative: Pain radiates into the thigh or further down the leg, and in both legs   Negative: SEVERE back pain (e.g., excruciating, unable to do any normal activities) and not improved after pain medicine and CARE ADVICE    Protocols used: Back Pain-A-[place]

## 2024-09-05 ENCOUNTER — TELEPHONE (OUTPATIENT)
Dept: FAMILY MEDICINE | Facility: CLINIC | Age: 59
End: 2024-09-05
Payer: COMMERCIAL

## 2024-09-05 NOTE — LETTER
September 12, 2024      Missy Vance  [address] [address]        Dear WILL Louis [facility] has tried to reach you by phone several times. Your Kidney function is stable. It looks like you still need to take your sodium bicarbonate three times daily.  Your Uric acid (gout test) is still high. You still needs to take your allopurinol. Please call [facility] at [phone] if questions or concerns. Thank you.      Sincerely,      [facility] RN and Dr Loya

## 2024-09-05 NOTE — TELEPHONE ENCOUNTER
----- Message from Myranda Loya sent at 9/5/2024  9:37 AM CDT -----  Kidney function is stable.  It looks like she still needs to take her sodium bicarbonate three times daily.   Uric acid (gout test) is still high. She still needs to take her allopurinol.

## 2024-09-05 NOTE — TELEPHONE ENCOUNTER
RN attempted to contact patient using an , but no answer.  left message on patient's voicemail to call clinic back.    If patient calls back, please relay message below. Thanks    Roslyn Montana RN  [facility]    Dr Loya  OK to wait until tomorrow to be seen, but if getting worse, please go to ADS/urgent care today.     Please tell her that her labs are STABLE. Kidney function is low, but similar to a month ago.

## 2024-09-05 NOTE — LETTER
September 12, 2024      Missy Vance  [address] [address]        Dear WILL Louis [facility] has tried to reach you by phone several times. Your kidney function is stable. It looks like you still need to take your sodium bicarbonate three times daily.  Uric acid (gout test) is still high. You still need to take her allopurinol. Please call [facility] at [phone] with questions or concerns. Thank you.      Sincerely,      Myranda Loya MD and [facility] RN

## 2024-09-05 NOTE — CONFIDENTIAL NOTE
OK to wait until tomorrow to be seen, but if getting worse, please go to ADS/urgent care today.    Please tell her that her labs are STABLE. Kidney function is low, but similar to a month ago.     Lab on 08/30/2024   Component Date Value Ref Range Status    Sodium 08/30/2024 140  135 - 145 mmol/L Final    Potassium 08/30/2024 5.3  3.4 - 5.3 mmol/L Final    Chloride 08/30/2024 110 (H)  98 - 107 mmol/L Final    Carbon Dioxide (CO2) 08/30/2024 17 (L)  22 - 29 mmol/L Final    Anion Gap 08/30/2024 13  7 - 15 mmol/L Final    Glucose 08/30/2024 86  70 - 99 mg/dL Final    Urea Nitrogen 08/30/2024 57.5 (H)  6.0 - 20.0 mg/dL Final    Creatinine 08/30/2024 2.79 (H)  0.51 - 0.95 mg/dL Final    GFR Estimate 08/30/2024 19 (L)  >60 mL/min/1.73m2 Final    eGFR calculated using 2021 CKD-EPI equation.    Calcium 08/30/2024 8.7 (L)  8.8 - 10.4 mg/dL Final    Reference intervals for this test were updated on 7/16/2024 to reflect our healthy population more accurately. There may be differences in the flagging of prior results with similar values performed with this method. Those prior results can be interpreted in the context of the updated reference intervals.    Albumin 08/30/2024 4.3  3.5 - 5.2 g/dL Final    Phosphorus 08/30/2024 4.7 (H)  2.5 - 4.5 mg/dL Final    Uric Acid 08/30/2024 8.3 (H)  2.4 - 5.7 mg/dL Final

## 2024-09-05 NOTE — TELEPHONE ENCOUNTER
RN attempted to contact patient using an , but no answer.  left message on patient's voicemail to call clinic back.    If patient calls back, please relay message below. Thanks    Roslyn Montana RN  [facility]      Myranda Loya MD  9/5/2024  9:37 AM CDT Back to Top      Kidney function is stable.  It looks like she still needs to take her sodium bicarbonate three times daily.  Uric acid (gout test) is still high. She still needs to take her allopurinol.

## 2024-09-06 ENCOUNTER — OFFICE VISIT (OUTPATIENT)
Dept: FAMILY MEDICINE | Facility: CLINIC | Age: 59
End: 2024-09-06
Payer: COMMERCIAL

## 2024-09-06 VITALS
BODY MASS INDEX: 28.83 KG/M2 | TEMPERATURE: 97.9 F | HEIGHT: 59 IN | DIASTOLIC BLOOD PRESSURE: 82 MMHG | SYSTOLIC BLOOD PRESSURE: 136 MMHG | OXYGEN SATURATION: 99 % | WEIGHT: 143 LBS | RESPIRATION RATE: 12 BRPM | HEART RATE: 65 BPM

## 2024-09-06 DIAGNOSIS — M54.9 ACUTE BILATERAL BACK PAIN, UNSPECIFIED BACK LOCATION: Primary | ICD-10-CM

## 2024-09-06 LAB
ALBUMIN UR-MCNC: 100 MG/DL
APPEARANCE UR: CLEAR
BILIRUB UR QL STRIP: NEGATIVE
COLOR UR AUTO: YELLOW
GLUCOSE UR STRIP-MCNC: NEGATIVE MG/DL
HGB UR QL STRIP: NEGATIVE
KETONES UR STRIP-MCNC: NEGATIVE MG/DL
LEUKOCYTE ESTERASE UR QL STRIP: NEGATIVE
NITRATE UR QL: NEGATIVE
PH UR STRIP: 5.5 [PH] (ref 5–8)
RBC #/AREA URNS AUTO: ABNORMAL /HPF
SP GR UR STRIP: 1.01 (ref 1–1.03)
SQUAMOUS #/AREA URNS AUTO: ABNORMAL /LPF
UROBILINOGEN UR STRIP-ACNC: 0.2 E.U./DL
WBC #/AREA URNS AUTO: ABNORMAL /HPF

## 2024-09-06 PROCEDURE — 81001 URINALYSIS AUTO W/SCOPE: CPT

## 2024-09-06 PROCEDURE — 99214 OFFICE O/P EST MOD 30 MIN: CPT

## 2024-09-06 RX ORDER — EZETIMIBE 10 MG/1
10 TABLET ORAL DAILY
COMMUNITY

## 2024-09-06 RX ORDER — METHOCARBAMOL 500 MG/1
500 TABLET, FILM COATED ORAL 2 TIMES DAILY PRN
Qty: 20 TABLET | Refills: 0 | Status: SHIPPED | OUTPATIENT
Start: 2024-09-06

## 2024-09-06 ASSESSMENT — ENCOUNTER SYMPTOMS: BACK PAIN: 1

## 2024-09-06 NOTE — TELEPHONE ENCOUNTER
RN made 2nd attempt to contact patient using an , but no answer.  left message on patient's voicemail to call clinic back.    If patient calls back, please relay message below. Thanks    Roslyn Montana RN  [facility]    Dr Loya  OK to wait until tomorrow to be seen, but if getting worse, please go to ADS/urgent care today.     Please tell her that her labs are STABLE. Kidney function is low, but similar to a month ago.

## 2024-09-06 NOTE — ASSESSMENT & PLAN NOTE
Patient presents today to clinic with a somewhat atypical presentation of midline lumbar back pain. She has had an insidious onset of this pain without known injury. The way she is describing the pain is almost neuropathic (burning), however it radiates upwards into the thoracic back and has no downward radiation. It does not follow specific dermatome lines. She is tender to palpation of the paraspinal muscles and has CVA tenderness bilaterally, however a completely benign urine. She also notes this seems to correlate with starting ezetimibe and is improving however not resolved with the discontinuation of this medication. She has CKD, however had normal labs just one week ago and again, a negative urine today. It's an atypical presentation of medication side effect but this can not be completely ruled out. It seems less likely to be of spinal pathology given the radiation pattern and her benign physical exam in clinic with provocative testing. I propose she continue holding her Zetia until she can follow up with her PCP. We will trial a low dose of Robaxin for spasms and paraspinal muscular pain. We thoroughly reviewed reasons to return to care. Patient expressed understanding of and agreement with this plan. All questions were answered.

## 2024-09-06 NOTE — PROGRESS NOTES
Assessment & Plan   Problem List Items Addressed This Visit       Acute bilateral back pain, unspecified back location - Primary     Patient presents today to clinic with a somewhat atypical presentation of midline lumbar back pain. She has had an insidious onset of this pain without known injury. The way she is describing the pain is almost neuropathic (burning), however it radiates upwards into the thoracic back and has no downward radiation. It does not follow specific dermatome lines. She is tender to palpation of the paraspinal muscles and has CVA tenderness bilaterally, however a completely benign urine. She also notes this seems to correlate with starting ezetimibe and is improving however not resolved with the discontinuation of this medication. She has CKD, however had normal labs just one week ago and again, a negative urine today. It's an atypical presentation of medication side effect but this can not be completely ruled out. It seems less likely to be of spinal pathology given the radiation pattern and her benign physical exam in clinic with provocative testing. I propose she continue holding her Zetia until she can follow up with her PCP. We will trial a low dose of Robaxin for spasms and paraspinal muscular pain. We thoroughly reviewed reasons to return to care. Patient expressed understanding of and agreement with this plan. All questions were answered.         Relevant Medications    methocarbamol (ROBAXIN) 500 MG tablet    Other Relevant Orders    UA Macroscopic with reflex to Microscopic and Culture - Clinic Collect (Completed)    UA Microscopic with Reflex to Culture (Completed)      Subjective   Missy is a 59 year old, presenting for the following health issues:  Back Pain (Burning pain up and down the whole back. Mostly around the waist area. Sx for 2 weeks. No urinary frequency or pain.)        9/6/2024     3:40 PM   Additional Questions   Roomed by sac   Accompanied by self          9/6/2024     3:40 PM   Patient Reported Additional Medications   Patient reports taking the following new medications no     OV to discuss back pain x2 weeks  Back pain is located to bilateral lumbar region but with shooting characteristics up towards her shoulders. It does not radiate down into her legs.   She describes the pain as a burning sensation  No known injury. She reports that she was started on Zetia by her PCP and about 5-6 days afterwards she developed this pain. She has stopped taking this medication and she reports that the intensity of the pain has improved however it is still present  Aggravating factors include night time. She feels like she is sitting next to a hot fireplace and the fire is burning her. It does not seem to be aggravated by movement.  She has not identified any alleviating factors. She has not tried any over the counter medications.  She denies any weakness of the extremities or paresthesias.      Hx of CKD. Had monitoring labs completed 8/30 (while experiencing the pain) and function was normal.    History of Present Illness       Back Pain:  She presents for follow up of back pain. Patient's back pain is a new problem.    Original cause of back pain: not sure  First noticed back pain: 1-4 weeks ago  Patient feels back pain: constantlyLocation of back pain:  Right shoulder, left shoulder, right side of waist and left side of waist  Description of back pain: shooting  Back pain spreads: right shoulder, left shoulder, right side of neck and left side of neck    Since patient first noticed back pain, pain is: gradually worsening  Does back pain interfere with her job:  No  On a scale of 1-10 (10 being the worst), patient describes pain as:  7  What makes back pain worse: sitting   Acupuncture: not tried  Acetaminophen: not tried  Activity or exercise: not tried  Chiropractor:  Not tried  Cold: not tried  Heat: not tried  Massage: not tried  Muscle relaxants: not tried  NSAIDS: not  "tried  Opioids: not tried  Physical Therapy: not tried  Rest: not tried  Steroid Injection: not tried  Stretching: not tried  Surgery: not tried  TENS unit: not tried  Topical pain relievers: not tried  Other healthcare providers patient is seeing for back pain: None   She is taking medications regularly.         Objective    /82 (BP Location: Left arm, Patient Position: Sitting, Cuff Size: Adult Regular)   Pulse 65   Temp 97.9  F (36.6  C) (Oral)   Resp 12   Ht 1.499 m (4' 11\")   Wt 64.9 kg (143 lb)   LMP  (LMP Unknown)   SpO2 99%   BMI 28.88 kg/m    Body mass index is 28.88 kg/m .    Physical Exam  Vitals and nursing note reviewed.   Constitutional:       General: She is not in acute distress.     Appearance: Normal appearance. She is not ill-appearing.   Cardiovascular:      Rate and Rhythm: Normal rate and regular rhythm.   Pulmonary:      Effort: Pulmonary effort is normal. No respiratory distress.      Breath sounds: No wheezing.   Musculoskeletal:      Lumbar back: Spasms and tenderness present. No bony tenderness. Normal range of motion. Negative right straight leg raise test and negative left straight leg raise test.      Comments: 5/5 strength BUE and BLE. Equal . Negative hip thrust, ANDREW, straight leg raise.   Neurological:      General: No focal deficit present.      Mental Status: She is alert.      Motor: No weakness.      Coordination: Coordination normal.      Gait: Gait normal.      Deep Tendon Reflexes: Reflexes normal.   Psychiatric:         Mood and Affect: Mood normal.         Behavior: Behavior normal.         Thought Content: Thought content normal.        Results for orders placed or performed in visit on 09/06/24 (from the past 24 hour(s))   UA Macroscopic with reflex to Microscopic and Culture - Clinic Collect    Specimen: Urine, Clean Catch   Result Value Ref Range    Color Urine Yellow Colorless, Straw, Light Yellow, Yellow    Appearance Urine Clear Clear    Glucose " Urine Negative Negative mg/dL    Bilirubin Urine Negative Negative    Ketones Urine Negative Negative mg/dL    Specific Gravity Urine 1.010 1.005 - 1.030    Blood Urine Negative Negative    pH Urine 5.5 5.0 - 8.0    Protein Albumin Urine 100 (A) Negative mg/dL    Urobilinogen Urine 0.2 0.2, 1.0 E.U./dL    Nitrite Urine Negative Negative    Leukocyte Esterase Urine Negative Negative   UA Microscopic with Reflex to Culture   Result Value Ref Range    RBC Urine 0-2 0-2 /HPF /HPF    WBC Urine 0-5 0-5 /HPF /HPF    Squamous Epithelials Urine Few (A) None Seen /LPF    Narrative    Urine Culture not indicated           Signed Electronically by: ALEJANDRINA Mccloud CNP

## 2024-09-09 NOTE — TELEPHONE ENCOUNTER
Patient seen on 9/6/24 By Ambar Carson CNP  No further action needed    Roslyn Montana RN  [facility]

## 2024-09-11 NOTE — TELEPHONE ENCOUNTER
RN made 2nd attempt to contact patient using an , but no answer.  left message on patient's voicemail to call clinic back.    If patient calls back, please relay message below. Thanks    Roslyn Montana RN  [facility]      Myranda Loya MD  9/5/2024  9:37 AM CDT Back to Top      Kidney function is stable.  It looks like she still needs to take her sodium bicarbonate three times daily.  Uric acid (gout test) is still high. She still needs to take her allopurinol.

## 2024-09-12 ENCOUNTER — TELEPHONE (OUTPATIENT)
Dept: FAMILY MEDICINE | Facility: CLINIC | Age: 59
End: 2024-09-12
Payer: COMMERCIAL

## 2024-09-12 DIAGNOSIS — N18.4 CHRONIC KIDNEY DISEASE, STAGE 4 (SEVERE) (H): ICD-10-CM

## 2024-09-12 RX ORDER — SODIUM BICARBONATE 650 MG/1
650 TABLET ORAL 3 TIMES DAILY
Qty: 360 TABLET | Refills: 4 | Status: SHIPPED | OUTPATIENT
Start: 2024-09-12

## 2024-09-12 NOTE — TELEPHONE ENCOUNTER
Myranda Loya MD   Kidney function is stable.  It looks like she still needs to take her sodium bicarbonate three times daily.  Uric acid (gout test) is still high. She still needs to take her allopurinol.    Patient returned call.  Provider test result above provided.  Per chart reviewed, patient need refill of the Sodium Bicarbonate.  Patient will call pharmacy for refill of the Allopurinol.     Meet Andre RN  [facility]

## 2024-09-12 NOTE — TELEPHONE ENCOUNTER
RN made 3rd attempt to contact patient using an , but no answer.  left message on patient's voicemail to call clinic back.  Per protocol, letter mailed to patient    If patient calls back, please relay message below. Thanks    Roslyn Montana RN  [facility]      Myranda Loya MD  9/5/2024  9:37 AM CDT Back to Top      Kidney function is stable.  It looks like she still needs to take her sodium bicarbonate three times daily.  Uric acid (gout test) is still high. She still needs to take her allopurinol.

## 2024-09-12 NOTE — TELEPHONE ENCOUNTER
Missy was seen today for results.    Diagnoses and all orders for this visit:    Chronic kidney disease, stage 4 (severe) (H)  -     sodium bicarbonate 650 MG tablet; Take 1 tablet (650 mg) by mouth 3 times daily.

## 2024-09-27 ENCOUNTER — PATIENT OUTREACH (OUTPATIENT)
Dept: CARE COORDINATION | Facility: CLINIC | Age: 59
End: 2024-09-27
Payer: COMMERCIAL

## 2024-09-27 NOTE — PROGRESS NOTES
9/27/2024  Clinic Care Coordination Contact  [facility]/Voicemail    Clinical Data: Care Coordinator Outreach    Outreach Documentation Number of Outreach Attempt   9/27/2024  11:46 AM 1       No answer. No Vm unable to leave message on patient's voicemail with call back information and requested return call.    Plan: Care Coordinator will try to reach patient again in 10 business days.    CHW follow up: 2nd attempt 10-8-24    Fariba Laurent  Community Health Worker  [facility] Care Coordination  [email or web address]   Office: [phone]  Fax: [phone]

## 2024-10-04 ENCOUNTER — TELEPHONE (OUTPATIENT)
Dept: FAMILY MEDICINE | Facility: CLINIC | Age: 59
End: 2024-10-04
Payer: COMMERCIAL

## 2024-10-04 ENCOUNTER — PATIENT OUTREACH (OUTPATIENT)
Dept: CARE COORDINATION | Facility: CLINIC | Age: 59
End: 2024-10-04
Payer: COMMERCIAL

## 2024-10-04 DIAGNOSIS — Z12.31 VISIT FOR SCREENING MAMMOGRAM: Primary | ICD-10-CM

## 2024-10-04 NOTE — PROGRESS NOTES
10/4/2024  Clinic Care Coordination Contact  Community Health Worker Follow Up    Care Gaps:     Health Maintenance Due   Topic Date Due    MAMMO SCREENING  08/20/2023    COLORECTAL CANCER SCREENING  03/26/2024    INFLUENZA VACCINE (1) 09/01/2024    COVID-19 Vaccine (6 - 2024-25 season) 09/01/2024    MICROALBUMIN  10/23/2024       Care Gaps Last addressed on 10-4-24, Care Gap Goal set: Yes, and Scheduled 11-1-24      Care Plan:   Care Plan: Mammogram       Problem: HP GENERAL PROBLEM       Goal: I will complete mammogram at [facility] [address] on 11-1-24       Start Date: 7/26/2024 Expected End Date: 11/29/2024    This Visit's Progress: 70% Recent Progress: 40%    Note:     Barriers: language, access to when next annual wellness visit  Strengths: [place], motivate to schedule  Patient expressed understanding of goal: yes  Action steps to achieve this goal:  1. I will do mammogram after  I see the doctor on 11-1-24 at [facility] [address].  Updated: 10-4-24  AL                Goal: I have scheduled appt with PCP on 11-1-24       This Visit's Progress: 100%                          Clinic Care Coordination Contact  Community Health Worker Follow Up    Intervention and Education during outreach:     Samia :  Radha ID # [id number]   Called and spoke with patient  and follow up on goal(s).  Patient reported:  -she already scheduled appt with the doctor on 11-1-24  -will do mammogram same day.    CHW Follow up: Monthly  CHW Plan: Follow up on goal (s)  CHW Next Follow Up: 11-18-24    Fariba Laurent  Community Health Worker  [facility] Care Coordination  [email or web address]   Office: [phone]  Fax: [phone]

## 2024-10-04 NOTE — Clinical Note
Dr Loya Pt is scheduled to see you on 11-1-24 she would like to complete mammogram after she sees you.

## 2024-10-04 NOTE — TELEPHONE ENCOUNTER
Patient Quality Outreach    Patient is due for the following:   Breast Cancer Screening - Mammogram    Next Steps:   Schedule a Adult Preventative    Type of outreach:    No contact made.  Patient has not responded or made appointments from the last 3 contacts.  Will follow up in 90 days.      Questions for provider review:    None           Andrea Behrend

## 2024-10-15 ENCOUNTER — PATIENT OUTREACH (OUTPATIENT)
Dept: CARE COORDINATION | Facility: CLINIC | Age: 59
End: 2024-10-15
Payer: COMMERCIAL

## 2024-10-15 NOTE — PROGRESS NOTES
Clinic Care Coordination Contact  Care Coordination Clinician Chart Review    Situation: Patient chart reviewed by Care Coordinator.       Background: Care Coordination Program started: 3/7/2024. Initial assessment completed and patient-centered care plan(s) were developed with participation from patient. Lead CC handed patient off to CHW for continued outreaches.       Assessment: Per chart review, patient outreach completed by CC CHW on 10.04.  Patient is actively working to accomplish goal(s). Patient's goal(s) appropriate and relevant at this time. Patient is not due for updated Plan of Care.  Assessments will be completed annually or as needed/with change of patient status.      Care Plan: Mammogram       Problem: HP GENERAL PROBLEM       Goal: I will complete mammogram at [facility] on 11-1-24       Start Date: 7/26/2024 Expected End Date: 11/29/2024    This Visit's Progress: 70% Recent Progress: 40%    Note:     Barriers: language, access to when next annual wellness visit  Strengths: has [place] insurance, motivate to schedule  Patient expressed understanding of goal: yes  Action steps to achieve this goal:  1. I will do mammogram after  I see the doctor on 11-1-24 at [facility].  Updated: 10-4-24  AL                Goal: I have scheduled appt with PCP on 11-1-24       This Visit's Progress: 100%                               Plan/Recommendations: The patient will continue working with Care Coordination to achieve goal(s) as above. CHW will continue outreaches at minimum every 30 days and will involve Lead CC as needed or if patient is ready to move to Maintenance. Lead CC will continue to monitor CHW outreaches and patient's progress to goal(s) every 6 weeks.     Plan of Care updated and sent to patient: yes

## 2024-10-15 NOTE — LETTER
[facility]  Patient Centered Plan of Care  About Me:        Patient Name:  Missy Michel    YOB: 1965  Age:         59 year old   Madhu MRN:    [id number] Telephone Information:  Home Phone [phone]   Mobile [phone]       Address:  [address] [place] [address] Email address:  [email or web address]      Emergency Contact(s)    Name Relationship Lgl Grd Work Phone Home Phone Mobile Phone   1. MC MICHEL Spouse    [phone]   2. MARISELA MICHEL Son    [phone]   3. ARIANNA MICHEL Son    [phone]           Primary language:  ong     needed? Yes   [employer]:  [phone] op. 1  Other communication barriers:Language barrier    Preferred Method of Communication:     Current living arrangement: I live in a private home with family; I live in a private home with spouse (live wiht  and child)    Mobility Status/ Medical Equipment: Independent        Health Maintenance  Health Maintenance Reviewed: Due/Overdue   Health Maintenance Due   Topic    MAMMO SCREENING     COLORECTAL CANCER SCREENING     INFLUENZA VACCINE (1)    COVID-19 Vaccine (6 - 2024-25 season)    MICROALBUMIN            My Access Plan  Medical Emergency 911   Primary Clinic Line [facility] [phone]   24 Hour Appointment Line [phone] or  [address] [place] [address] [phone]) (toll-free)   24 Hour Nurse Line [phone] (toll-free)   Preferred Urgent Care [facility], [phone]     Preferred Hospital [facility]  [phone]     Preferred Pharmacy [facility] - [place], [place] - [address]     Behavioral Health Crisis Line The National Suicide Prevention Lifeline at 1-434.246.5201 or Text/Call 988           My Care Team Members  Patient Care Team         Relationship Specialty Notifications Start End    Myranda Loya MD PCP - General Family Medicine  5/22/24     Referring to  NEPHRO    Phone: [phone] Fax: [phone]         [address] [place] [address]    Mryanda Loya MD Assigned PCP   6/16/21     Phone: [phone] Fax: [phone]         [address] [place] [address]    Fariba Laurent CHW Community Health Worker Primary Care - CC  3/15/24     Phone: [phone] Fax: [phone]         [address] [place] [address]    Carmen Enciso RN Lead Care Coordinator Primary Care - CC Admissions 5/7/24                 My Care Plans  Self Management and Treatment Plan    Care Plan  Care Plan: Mammogram       Problem: HP GENERAL PROBLEM       Goal: I will complete mammogram at [facility] on 11-1-24       Start Date: 7/26/2024 Expected End Date: 11/29/2024    This Visit's Progress: 70% Recent Progress: 40%    Note:     Barriers: language, access to when next annual wellness visit  Strengths: has [place] insurance, motivate to schedule  Patient expressed understanding of goal: yes  Action steps to achieve this goal:  1. I will do mammogram after  I see the doctor on 11-1-24 at [facility].  Updated: 10-4-24  AL                Goal: I have scheduled appt with PCP on 11-1-24       This Visit's Progress: 100%                            Action Plans on File:                       Advance Care Plans/Directives:   Advanced Care Plan/Directives on file:   No    Discussed with patient/caregiver(s):   Declined Further Information             My Medical and Care Information  Problem List   Patient Active Problem List   Diagnosis    Hypertension goal BP (blood pressure) < 130/80    Hyperlipidemia LDL goal <130    Overweight (BMI 25.0-29.9)    Cyst of kidney, acquired    Prediabetes    IgA nephropathy    Anemia of renal disease    Chronic kidney disease, stage 4 (severe) (H)    External hemorrhoids    Secondary renal hyperparathyroidism (H)    Hyperuricemia    Acute bilateral back pain, unspecified back location      Current Medications and Allergies:  See printed Medication Report.    Care  Coordination Start Date: 3/7/2024   Frequency of Care Coordination: monthly, more frequently as needed     Form Last Updated: 10/15/2024

## 2024-10-16 ENCOUNTER — ALLIED HEALTH/NURSE VISIT (OUTPATIENT)
Dept: FAMILY MEDICINE | Facility: CLINIC | Age: 59
End: 2024-10-16
Payer: COMMERCIAL

## 2024-10-16 DIAGNOSIS — N18.4 CHRONIC KIDNEY DISEASE, STAGE 4 (SEVERE) (H): Primary | ICD-10-CM

## 2024-10-16 PROCEDURE — 99207 PR NO CHARGE NURSE ONLY: CPT

## 2024-10-16 NOTE — PROGRESS NOTES
Patient is here at the clinic with a question about her sodium bicarbonate. Pt used to have [employer]. Pt was told by the pharmacy that her new insurance [employer] will not cover sodium bicarbonate.     Pt will have to pay $15 out of pocket for a one month supply. Pt endorsed she will stop taking sodium bicarb if she has to pay oop. Pt is already paying a lot for her deductible. RN emphasized importance of taking medication due to CKD.    Is there an alternative or a program to help with cost?    Routed to PCP for review and advise.    Jacinto RICHARDS, RN  [facility]

## 2024-10-17 RX ORDER — SODIUM BICARBONATE 650 MG/1
650 TABLET ORAL 3 TIMES DAILY
Qty: 360 TABLET | Refills: 4 | Status: SHIPPED | OUTPATIENT
Start: 2024-10-17 | End: 2024-11-01

## 2024-11-01 ENCOUNTER — OFFICE VISIT (OUTPATIENT)
Dept: FAMILY MEDICINE | Facility: CLINIC | Age: 59
End: 2024-11-01
Payer: COMMERCIAL

## 2024-11-01 VITALS
SYSTOLIC BLOOD PRESSURE: 132 MMHG | OXYGEN SATURATION: 100 % | BODY MASS INDEX: 28.83 KG/M2 | HEART RATE: 71 BPM | TEMPERATURE: 97.5 F | RESPIRATION RATE: 20 BRPM | DIASTOLIC BLOOD PRESSURE: 78 MMHG | WEIGHT: 143 LBS | HEIGHT: 59 IN

## 2024-11-01 DIAGNOSIS — Z12.31 VISIT FOR SCREENING MAMMOGRAM: ICD-10-CM

## 2024-11-01 DIAGNOSIS — N25.81 SECONDARY RENAL HYPERPARATHYROIDISM (H): ICD-10-CM

## 2024-11-01 DIAGNOSIS — N18.4 CHRONIC KIDNEY DISEASE, STAGE 4 (SEVERE) (H): Primary | ICD-10-CM

## 2024-11-01 DIAGNOSIS — Z12.4 CERVICAL CANCER SCREENING: ICD-10-CM

## 2024-11-01 DIAGNOSIS — I10 HYPERTENSION GOAL BP (BLOOD PRESSURE) < 130/80: Primary | ICD-10-CM

## 2024-11-01 DIAGNOSIS — E79.0 HYPERURICEMIA: ICD-10-CM

## 2024-11-01 DIAGNOSIS — E78.5 HYPERLIPIDEMIA LDL GOAL <130: ICD-10-CM

## 2024-11-01 LAB — HGB BLD-MCNC: 10.9 G/DL (ref 11.7–15.7)

## 2024-11-01 PROCEDURE — T1013 SIGN LANG/ORAL INTERPRETER: HCPCS | Mod: U4 | Performed by: INTERPRETER

## 2024-11-01 PROCEDURE — 99207 E-CONSULT TO CARDIOLOGY (ADULT OUTPT PROVIDER TO SPECIALIST WRITTEN QUESTION & RESPONSE): CPT | Performed by: FAMILY MEDICINE

## 2024-11-01 PROCEDURE — 90471 IMMUNIZATION ADMIN: CPT | Performed by: FAMILY MEDICINE

## 2024-11-01 PROCEDURE — 90673 RIV3 VACCINE NO PRESERV IM: CPT | Performed by: FAMILY MEDICINE

## 2024-11-01 PROCEDURE — 82043 UR ALBUMIN QUANTITATIVE: CPT | Performed by: FAMILY MEDICINE

## 2024-11-01 PROCEDURE — 85018 HEMOGLOBIN: CPT | Performed by: FAMILY MEDICINE

## 2024-11-01 PROCEDURE — 90480 ADMN SARSCOV2 VAC 1/ONLY CMP: CPT | Performed by: FAMILY MEDICINE

## 2024-11-01 PROCEDURE — 83721 ASSAY OF BLOOD LIPOPROTEIN: CPT | Performed by: FAMILY MEDICINE

## 2024-11-01 PROCEDURE — 83735 ASSAY OF MAGNESIUM: CPT | Performed by: FAMILY MEDICINE

## 2024-11-01 PROCEDURE — 84550 ASSAY OF BLOOD/URIC ACID: CPT | Performed by: FAMILY MEDICINE

## 2024-11-01 PROCEDURE — 82570 ASSAY OF URINE CREATININE: CPT | Performed by: FAMILY MEDICINE

## 2024-11-01 PROCEDURE — 80069 RENAL FUNCTION PANEL: CPT | Performed by: FAMILY MEDICINE

## 2024-11-01 PROCEDURE — 91320 SARSCV2 VAC 30MCG TRS-SUC IM: CPT | Performed by: FAMILY MEDICINE

## 2024-11-01 PROCEDURE — 99214 OFFICE O/P EST MOD 30 MIN: CPT | Mod: 25 | Performed by: FAMILY MEDICINE

## 2024-11-01 PROCEDURE — 36415 COLL VENOUS BLD VENIPUNCTURE: CPT | Performed by: FAMILY MEDICINE

## 2024-11-01 RX ORDER — SODIUM BICARBONATE 650 MG/1
650 TABLET ORAL 3 TIMES DAILY
Qty: 360 TABLET | Refills: 4 | Status: SHIPPED | OUTPATIENT
Start: 2024-11-01

## 2024-11-01 RX ORDER — AMLODIPINE BESYLATE 10 MG/1
10 TABLET ORAL DAILY
Qty: 90 TABLET | Refills: 4 | Status: SHIPPED | OUTPATIENT
Start: 2024-11-01

## 2024-11-01 ASSESSMENT — PAIN SCALES - GENERAL: PAINLEVEL_OUTOF10: NO PAIN (0)

## 2024-11-01 NOTE — TELEPHONE ENCOUNTER
Medication Question or Refill    What medication are you calling about (include dose and sig)?: amlodipine 10 mg    Preferred Pharmacy:  [facility] - [place], [place] - [address]  [address]  [address]  [place] [address]  Phone: [phone] Fax: [phone]    Who prescribed the medication?: Dr. Loya    Do you need a refill? Yes    Do you have any questions or concerns?  Yes: I saw Dr. Loya today, she told me to start amlodipine 10 mg. The pharmacy does not have the prescription.    Could we send this information to you in [employer] or would you prefer to receive a phone call?:   Patient would prefer a phone call   Okay to leave a detailed message?: No at Home number on file [phone] (home)     Routed to PCP for review. Pended, sign if agree.    Jacinto RICHARDS RN  [facility]

## 2024-11-01 NOTE — PATIENT INSTRUCTIONS
Blood Pressure  STOP amlodipine/benazepril  START amlodipine 10 mg   Nurse visit for blood pressure in 2 weeks

## 2024-11-01 NOTE — PROGRESS NOTES
"Office Visit  [facility]  Date of Service: Nov 1, 2024      Subjective   Missy Vance is a 59 year old female who presents for   Chief Complaint   Patient presents with    Kidney Problem     F/up    Recheck Medication     Not sure of the names of all meds      Hyperlipidemia  Statins (atorva/simva/rosuvastatin) - caused back pain. Pain went away when she stopped them. Not tolerable at low doses.  Ezetimibe - caused joint pain hands and feet, felt swollen. Pain went away when she stopped it. Arthralgia Incidence: 2.6% to 3%.  Not possible to eat better or less cholesterol. Eats veggies, beans, chicken and fish.         The 10-year ASCVD risk score (Timo ESCAMILLA, et al., 2019) is: 3.8%    Values used to calculate the score:      Age: 59 years      Sex: Female      Is Non- : No      Diabetic: No      Tobacco smoker: No      Systolic Blood Pressure: 132 mmHg      Is BP treated: Yes      HDL Cholesterol: 66 mg/dL      Total Cholesterol: 259 mg/dL    Declines covid shot today.    Objective   /78   Pulse 71   Temp 97.5  F (36.4  C) (Oral)   Resp 20   Ht 1.499 m (4' 11\")   Wt 64.9 kg (143 lb)   LMP  (LMP Unknown)   SpO2 100%   BMI 28.88 kg/m   She reports that she has never smoked. She has never been exposed to tobacco smoke. She has never used smokeless tobacco.    Gen: Alert, no apparent distress.  Feet: intact sensation, no swelling    Results for orders placed or performed in visit on 11/01/24   Hemoglobin     Status: Abnormal   Result Value Ref Range    Hemoglobin 10.9 (L) 11.7 - 15.7 g/dL     Assessment & Plan     CKD4 due to polycystic kidney disease. eGFR 19.  Recheck labs.  Continue sodium bicarbonate 650 three times daily.  Anemia of chronic disease.  stable  Hyperlipidemia. LDL >190.  Intolerant of statins and ezetimibe. eConsult sent to cardiology for recommendations.  Hyperuricemia.  Continue allopurinol 300 daily.  Hypertension. BP " goal <130/80 due to CKD.  Stop amlodipine/benazepril 5/20mg due to GFR<30.   Start amlodipine 10 mg.  Nurse visit in 2 weeks for BP check.       Order Summary                                                      Chronic kidney disease, stage 4 (severe) (H)  -     Albumin Random Urine Quantitative with Creat Ratio; Future  -     BASIC METABOLIC PANEL; Future  -     Hemoglobin; Future  -     Renal panel; Future  -     Phosphorus; Future  -     Magnesium; Future  -     sodium bicarbonate 650 MG tablet; Take 1 tablet (650 mg) by mouth 3 times daily.  -     Hemoglobin  -     Renal panel  -     Magnesium  -     Albumin Random Urine Quantitative with Creat Ratio    Cervical cancer screening  -     HPV and Gynecologic Cytology Panel - Recommended Age 30 - 65 Years    Visit for screening mammogram  -     MA Screen Bilateral w/Kraig; Future    Hyperlipidemia LDL goal <130  -     LDL cholesterol direct; Future  -     LDL cholesterol direct  -     Adult E-Consult to Cardiology (Outpt Provider to Specialist Written Question & Response)    Hyperuricemia  -     Uric acid; Future  -     Uric acid    Secondary renal hyperparathyroidism (H)    Other orders  -     INFLUENZA VACCINE TRIVALENT(FLUBLOK)  -     COVID-19 12+ (PFIZER)  -     PRIMARY CARE FOLLOW-UP SCHEDULING; Future        Immunizations Administered       Name Date Dose VIS Date Route    COVID-19 12+ (Pfizer) 11/1/24 10:10 AM 0.3 mL EUI,10/17/2024,Given today Intramuscular    Influenza Vaccine IM 18-49 Yrs, RIV3 11/1/24 10:09 AM 0.5 mL 08/06/2021, Given Today Intramuscular            Future Appointments   Date Time Provider Department Center   11/18/2024 11:00 AM [facility]       Completed by: Myranda Loya M.D., [facility]. 11/1/2024 9:27 AM.  The longitudinal plan of care for the diagnosis(es)/condition(s) as documented were addressed during this visit.   Due to the added complexity in care, I will continue to support Missy in the  subsequent management and with ongoing continuity of care.   This transcription uses voice recognition software, which may contain typographical errors.  MDM: [facility] neighborhood: [place] [address], language: Hmong, .  Prior to immunization administration, verified patients identity using patient s name and date of birth. Please see Immunization Activity for additional information.     Screening Questionnaire for Adult Immunization    Are you sick today?   No   Do you have allergies to medications, food, a vaccine component or latex?   Yes   Have you ever had a serious reaction after receiving a vaccination?   No   Do you have a long-term health problem with heart, lung, kidney, or metabolic disease (e.g., diabetes), asthma, a blood disorder, no spleen, complement component deficiency, a cochlear implant, or a spinal fluid leak?  Are you on long-term aspirin therapy?   Yes   Do you have cancer, leukemia, HIV/AIDS, or any other immune system problem?   No   Do you have a parent, brother, or sister with an immune system problem?   No   In the past 3 months, have you taken medications that affect  your immune system, such as prednisone, other steroids, or anticancer drugs; drugs for the treatment of rheumatoid arthritis, Crohn s disease, or psoriasis; or have you had radiation treatments?   No   Have you had a seizure, or a brain or other nervous system problem?   No   During the past year, have you received a transfusion of blood or blood    products, or been given immune (gamma) globulin or antiviral drug?   No   For women: Are you pregnant or is there a chance you could become       pregnant during the next month?   No   Have you received any vaccinations in the past 4 weeks?   No     Immunization questionnaire was positive for at least one answer.  Notified .      Patient instructed to remain in clinic for 15 minutes afterwards, and to report any adverse reactions.     Screening performed by Hannah Cabral MA on  11/1/2024 at 9:16 AM.       Answers submitted by the patient for this visit:  CKD Visit (Submitted on 11/1/2024)  Chief Complaint: Chronic problems general questions HPI Form  Do you take any over the counter pain medicine?  : No  General Questionnaire (Submitted on 11/1/2024)  Chief Complaint: Chronic problems general questions HPI Form  How many servings of fruits and vegetables do you eat daily?: 4 or more  On average, how many sweetened beverages do you drink each day (Examples: soda, juice, sweet tea, etc.  Do NOT count diet or artificially sweetened beverages)?: 1  How many minutes a day do you exercise enough to make your heart beat faster?: 10 to 19  How many days a week do you exercise enough to make your heart beat faster?: 5  How many days per week do you miss taking your medication?: 0  Questionnaire about: Chronic problems general questions HPI Form (Submitted on 11/1/2024)  Chief Complaint: Chronic problems general questions HPI Form

## 2024-11-02 ENCOUNTER — E-CONSULT (OUTPATIENT)
Dept: CARDIOLOGY | Facility: CLINIC | Age: 59
End: 2024-11-02
Payer: COMMERCIAL

## 2024-11-02 LAB
ALBUMIN SERPL BCG-MCNC: 4.2 G/DL (ref 3.5–5.2)
ANION GAP SERPL CALCULATED.3IONS-SCNC: 11 MMOL/L (ref 7–15)
BUN SERPL-MCNC: 61.3 MG/DL (ref 6–20)
CALCIUM SERPL-MCNC: 8.7 MG/DL (ref 8.8–10.4)
CHLORIDE SERPL-SCNC: 110 MMOL/L (ref 98–107)
CREAT SERPL-MCNC: 2.71 MG/DL (ref 0.51–0.95)
CREAT UR-MCNC: 39.7 MG/DL
EGFRCR SERPLBLD CKD-EPI 2021: 20 ML/MIN/1.73M2
GLUCOSE SERPL-MCNC: 99 MG/DL (ref 70–99)
HCO3 SERPL-SCNC: 18 MMOL/L (ref 22–29)
LDLC SERPL DIRECT ASSAY-MCNC: 191 MG/DL
MAGNESIUM SERPL-MCNC: 1.7 MG/DL (ref 1.7–2.3)
MICROALBUMIN UR-MCNC: 542 MG/L
MICROALBUMIN/CREAT UR: 1365.24 MG/G CR (ref 0–25)
PHOSPHATE SERPL-MCNC: 4.6 MG/DL (ref 2.5–4.5)
POTASSIUM SERPL-SCNC: 4.7 MMOL/L (ref 3.4–5.3)
SODIUM SERPL-SCNC: 139 MMOL/L (ref 135–145)
URATE SERPL-MCNC: 4.6 MG/DL (ref 2.4–5.7)

## 2024-11-02 PROCEDURE — 99451 NTRPROF PH1/NTRNET/EHR 5/>: CPT | Performed by: INTERNAL MEDICINE

## 2024-11-02 NOTE — PROGRESS NOTES
11/2/2024     E-Consult has been accepted.    Interprofessional consultation requested by:  Myranda Loya MD      Clinical Question/Purpose: MY CLINICAL QUESTION IS: Cholesterol medication management for primary prevention.    Patient assessment and information reviewed:     Recommendations:     Given the degree of LDL elevation, a PCSK9 inhibitor such as repatha or praluent would be appropriate. I would also recommend obtaining an Lp (a) and coronary artery calcium scoring for a more precise risk assessment.     The recommendations provided in this E-Consult are based on a review of clinical data pertinent to the clinical question presented, without a review of the patient's complete medical record or, the benefit of a comprehensive in-person or virtual patient evaluation. This consultation should not replace the clinical judgement and evaluation of the provider ordering this E-Consult. Any new clinical issues, or changes in patient status since the filing of this E-Consult will need to be taken into account when assessing these recommendations. Please contact me if you have further questions.    My total time spent reviewing clinical information and formulating assessment was 10 minutes.        Ayo Crum MD

## 2024-11-11 ENCOUNTER — TELEPHONE (OUTPATIENT)
Dept: FAMILY MEDICINE | Facility: CLINIC | Age: 59
End: 2024-11-11
Payer: COMMERCIAL

## 2024-11-11 NOTE — TELEPHONE ENCOUNTER
FYI - Status Update    Who is Calling: patient    Update: Pt has appt on 11/21 for BP check with nurse. Please reach out to pt if this is not okay. Provider adv pt to do BP check in 2 weeks from 11/01/2024    Does caller want a call/response back: Yes     Could we send this information to you in [employer] or would you prefer to receive a phone call?:   Patient would like to be contacted via [employer]

## 2024-11-12 ENCOUNTER — APPOINTMENT (OUTPATIENT)
Dept: INTERPRETER SERVICES | Facility: CLINIC | Age: 59
End: 2024-11-12
Payer: COMMERCIAL

## 2024-11-12 ENCOUNTER — TELEPHONE (OUTPATIENT)
Dept: FAMILY MEDICINE | Facility: CLINIC | Age: 59
End: 2024-11-12
Payer: COMMERCIAL

## 2024-11-12 RX ORDER — EVOLOCUMAB 140 MG/ML
140 INJECTION, SOLUTION SUBCUTANEOUS
Qty: 6 ML | Refills: 4 | Status: SHIPPED | OUTPATIENT
Start: 2024-11-12

## 2024-11-12 NOTE — TELEPHONE ENCOUNTER
Prior Authorization Approval    Authorization Effective Date: 10/13/2024  Authorization Expiration Date: 11/12/2025  Medication: Repatha 140mg/ml  Approved Dose/Quantity:   Reference #:     Insurance Company: [employer] - Phone [phone] Fax [phone]  Expected CoPay:       CoPay Card Available:      Foundation Assistance Needed:    Which Pharmacy is filling the prescription (Not needed for infusion/clinic administered): [facility] - [place], [place] - [address]  Pharmacy Notified:  yes  Patient Notified:  yes- Pharmacy will contact patient when ready to /ship

## 2024-11-12 NOTE — TELEPHONE ENCOUNTER
Myranda Loya MD  P [facility] - Primary Care  Please let Missy know:  She has more protein in her urine again. Blood kidney tests are table. She does need the sodium bicarbonate pills. Discuss this with [facility] pharmacist when she comes in.  Her gout lab (uric acid) is good.

## 2024-11-12 NOTE — TELEPHONE ENCOUNTER
1st: Left voicemail requesting patient return call to clinic. Please relay Dr. Loya' message upon return call.    Tabitha Jarrett RN  [facility]

## 2024-11-12 NOTE — RESULT ENCOUNTER NOTE
"Please let Missy know:  I reviewed her cholesterol levels with cardiology and their recommendation was \"given the degree of LDL elevation, a PCSK9 inhibitor such as repatha or praluent would be appropriate. I would also recommend obtaining an Lp (a) and coronary artery calcium scoring for a more precise risk assessment.\"    I put in referrals for:  -MT[facility] pharmacist and prescription for Repatha. The pharmacist will help make sure she has insurance coverage and teach her how to give it. Please help her schedule.  -Labs: recheck cholesterol and lipoprotein (a).  -Coronary artery calcium scoring - this is a CT scan.  "

## 2024-11-12 NOTE — TELEPHONE ENCOUNTER
Central Prior Authorization Team   Phone: [phone]    PA Initiation    Medication: Repatha 140mg/ml  Insurance Company: [employer] - Phone [phone] Fax [phone]  Pharmacy Filling the Rx: [facility] - [place], [place] - [address]  Filling Pharmacy Phone: [phone]  Filling Pharmacy Fax:    Start Date: 11/12/2024    Finished through EPA, covermymeds is down

## 2024-11-12 NOTE — TELEPHONE ENCOUNTER
Patient returning call. I conferenced in interprepeter #[id number]. Advised of results and recommendations per Dr. Loya.  Scheduled lab only appointment and MTM appointment [first available that patient could come in]. Also transferred patient and  to imaging scheduling to schedule coronary calcium score.    Blanca Marti, RN, BSN, PHN  [facility]

## 2024-11-12 NOTE — RESULT ENCOUNTER NOTE
Please let Missy know:  She has more protein in her urine again. Blood kidney tests are table. She does need the sodium bicarbonate pills. Discuss this with [facility] pharmacist when she comes in.  Her gout lab (uric acid) is good.

## 2024-11-12 NOTE — TELEPHONE ENCOUNTER
"1st attempt. LV on VM in [facility].     If pt calls back, please go over clinicians message and recommendations below.     Debby HORNER RN  [facility]        ----- Message from Myranda Loya sent at 11/12/2024 12:40 PM CST -----  Please let Missy know:  I reviewed her cholesterol levels with cardiology and their recommendation was \"given the degree of LDL elevation, a PCSK9 inhibitor such as repatha or praluent would be appropriate. I would also recommend obtaining an Lp (a) and coronary artery calcium scoring for a more precise risk assessment.\"    I put in referrals for:  -[facility] pharmacist and prescription for Repatha. The pharmacist will help make sure she has insurance coverage and teach her how to give it. Please help her schedule.  -Labs: recheck cholesterol and lipoprotein (a).  -Coronary artery calcium scoring - this is a CT scan.    "

## 2024-11-18 ENCOUNTER — PATIENT OUTREACH (OUTPATIENT)
Dept: CARE COORDINATION | Facility: CLINIC | Age: 59
End: 2024-11-18

## 2024-11-18 NOTE — PROGRESS NOTES
11/18/2024  Clinic Care Coordination Contact  [facility]/Voicemail    Clinical Data: Care Coordinator Outreach    Outreach Documentation Number of Outreach Attempt   11/18/2024   3:29 PM 1     [facility] : Ag Desouza    Left message on patient's voicemail with call back information and requested return call.    Plan: Care Coordinator will try to reach patient again in 10 business days.    CHW follow up: 2nd attempt 12-2-24    Fariba Laurent  Community Health Worker  [facility]  Clinic Care Coordination  [email or web address]   Office: [phone]  Fax: [phone]

## 2024-11-19 ENCOUNTER — MYC MEDICAL ADVICE (OUTPATIENT)
Dept: FAMILY MEDICINE | Facility: CLINIC | Age: 59
End: 2024-11-19
Payer: COMMERCIAL

## 2024-11-19 DIAGNOSIS — E87.20 METABOLIC ACIDOSIS: ICD-10-CM

## 2024-11-19 DIAGNOSIS — N25.81 SECONDARY RENAL HYPERPARATHYROIDISM (H): Primary | ICD-10-CM

## 2024-11-19 RX ORDER — SODIUM BICARBONATE
3 POWDER (GRAM) MISCELLANEOUS DAILY
Status: SHIPPED
Start: 2024-11-19

## 2024-11-19 NOTE — TELEPHONE ENCOUNTER
"Patient advised of lab results per Dr. Loya.    She was not able to  the sodium bicarbonate because \"insurance won't cover it\". Please advise.    Also routing to PA team to determine coverage.    Blanca Marti RN, BSN, PHN  [facility]    "

## 2024-11-19 NOTE — TELEPHONE ENCOUNTER
I would like her to use baking soda from the kitchen, instead of the pill then. She can buy it at the grocery store.    Diagnoses and all orders for this visit:    Secondary renal hyperparathyroidism (H)    Metabolic acidosis  -     Sodium Bicarbonate, antacid, POWD; Take 3 g by mouth daily. Use 1/4th of a teaspoon of sodium bicarbonate powder dissolved in 8 ounces of water daily.

## 2024-11-19 NOTE — TELEPHONE ENCOUNTER
Attempt #2. No answer. LVM to call clinic. Please relay clinician's message below if pt calls back. Viigohart message sent.    Jaicnto RICHARDS RN  [facility]

## 2024-11-20 NOTE — TELEPHONE ENCOUNTER
RN called and relayed clinician's message below. Pt verbalized understanding and in agreement with plan. Shakahart message sent per patient request.      Jacinto RICHARDS RN  [facility]

## 2024-11-20 NOTE — TELEPHONE ENCOUNTER
Called and relayed clinician's message below. Pt verbalized understanding.    Jacinto RICHARDS RN  [facility]

## 2024-11-21 ENCOUNTER — ALLIED HEALTH/NURSE VISIT (OUTPATIENT)
Dept: FAMILY MEDICINE | Facility: CLINIC | Age: 59
End: 2024-11-21
Payer: COMMERCIAL

## 2024-11-21 ENCOUNTER — LAB (OUTPATIENT)
Dept: LAB | Facility: CLINIC | Age: 59
End: 2024-11-21
Payer: COMMERCIAL

## 2024-11-21 VITALS — DIASTOLIC BLOOD PRESSURE: 74 MMHG | SYSTOLIC BLOOD PRESSURE: 122 MMHG

## 2024-11-21 DIAGNOSIS — E78.5 HYPERLIPIDEMIA LDL GOAL <130: ICD-10-CM

## 2024-11-21 DIAGNOSIS — I10 HYPERTENSION GOAL BP (BLOOD PRESSURE) < 130/80: Primary | ICD-10-CM

## 2024-11-21 LAB
APO A-I SERPL-MCNC: 91 MG/DL
CHOLEST SERPL-MCNC: 298 MG/DL
FASTING STATUS PATIENT QL REPORTED: NO
HDLC SERPL-MCNC: 69 MG/DL
LDLC SERPL CALC-MCNC: 210 MG/DL
NONHDLC SERPL-MCNC: 229 MG/DL
TRIGL SERPL-MCNC: 97 MG/DL

## 2024-11-21 NOTE — PROGRESS NOTES
I met with Missy Vance at the request of  to recheck her blood pressure.  Blood pressure medications on the med list were reviewed with patient.    Patient has taken all medications as per usual regimen: Yes  Patient reports tolerating them without any issues or concerns: No    Vitals:    11/21/24 1022   BP: 122/74   BP Location: Left arm   Patient Position: Sitting   Cuff Size: Adult Regular       Blood pressure was taken, previous encounter was reviewed, recorded blood pressure below 140/90.  Patient was discharged and the note will be sent to the provider for final review.

## 2024-11-22 ENCOUNTER — TELEPHONE (OUTPATIENT)
Dept: FAMILY MEDICINE | Facility: CLINIC | Age: 59
End: 2024-11-22
Payer: COMMERCIAL

## 2024-11-22 DIAGNOSIS — I10 HYPERTENSION GOAL BP (BLOOD PRESSURE) < 130/80: ICD-10-CM

## 2024-11-22 DIAGNOSIS — N18.4 CHRONIC KIDNEY DISEASE, STAGE 4 (SEVERE) (H): ICD-10-CM

## 2024-11-22 DIAGNOSIS — N25.81 SECONDARY RENAL HYPERPARATHYROIDISM (H): Primary | ICD-10-CM

## 2024-11-22 PROBLEM — E78.41 ELEVATED LIPOPROTEIN(A): Status: ACTIVE | Noted: 2024-11-22

## 2024-11-22 NOTE — TELEPHONE ENCOUNTER
----- Message from Myranda Loya sent at 11/22/2024  3:11 PM CST -----  Please let Missy know:  Cholesterol is VERY high. She definitely needs the Repatha shots. Any problems getting that started?    Future Appointments  12/13/2024 10:30 AM   Sha Ray, [facility]  [facility]

## 2024-11-27 ENCOUNTER — TELEPHONE (OUTPATIENT)
Dept: NURSING | Facility: CLINIC | Age: 59
End: 2024-11-27
Payer: COMMERCIAL

## 2024-11-27 ENCOUNTER — PATIENT OUTREACH (OUTPATIENT)
Dept: CARE COORDINATION | Facility: CLINIC | Age: 59
End: 2024-11-27
Payer: COMMERCIAL

## 2024-11-27 NOTE — PROGRESS NOTES
Clinic Care Coordination Contact    Situation: Patient chart reviewed by care coordinator.    Background: RN CC review for status update    Assessment: CHW attempt outreach on 11.18, will do second attempt next week  Future Appointments   Date Time Provider Department Center   12/13/2024 10:30 AM Sha Ray, PharmD [facility]         Plan/Recommendations: Community Health Worker to outreach per standard work and updated on goal progression  RN CC will review in 4-6 weeks to support ongoing recommendations and plan of care will be available sooner if needed.

## 2024-11-28 NOTE — TELEPHONE ENCOUNTER
Patient stating she is returning call from clinic from around 9 AM today. Writer unable to determine what call was about/from. Advised patient to return call to clinic during office hours on Friday, Nov 29.      Amy Otoole RN on 11/27/2024 at 6:59 PM

## 2024-12-02 ENCOUNTER — PATIENT OUTREACH (OUTPATIENT)
Dept: CARE COORDINATION | Facility: CLINIC | Age: 59
End: 2024-12-02
Payer: COMMERCIAL

## 2024-12-02 ENCOUNTER — APPOINTMENT (OUTPATIENT)
Dept: INTERPRETER SERVICES | Facility: CLINIC | Age: 59
End: 2024-12-02
Payer: COMMERCIAL

## 2024-12-02 NOTE — PROGRESS NOTES
12/2/2024  Clinic Care Coordination Contact  [facility]/Voicemail    Clinical Data: Care Coordinator Outreach    Outreach Documentation Number of Outreach Attempt   11/18/2024   3:29 PM 1   12/2/2024   2:21 PM 2     [facility] Samia : Marcelino    Left message on patient's voicemail with call back information and requested return call.    Called and spoke with patient's son emergency contact and verify if patient has new contact information to relay message to call CHW back if she needs support from CCC team.  Son stated she has a new phone number. [phone]  Updated in demographics of new phone.    Plan: Care Coordinator will try to reach patient again in 10 business days.    CHW follow up: 2nd attempt 12-16-24    Fariba Laurent  Community Health Worker  [facility]  Clinic Care Coordination  [email or web address]  [email or web address]   Office: [phone]  Fax: [phone]

## 2024-12-03 NOTE — TELEPHONE ENCOUNTER
"Called patient with . She has not yet picked up her Repatha injections. She will bring them with her to her upcoming MT visit for instruction on how to administer.     Patient additionally reports concern for throat fullness. She believes this is related to an enlarged thyroid. She was told in July that she has hyperparathyroidism and has been concerned about her thyroid ever since.     States: \"since I've started taking my blood pressure medicine in the mornings, I feel like my throat is full, like something is choking me.\"   Reports this is intermittent. Lasts about 30 minutes to one hour per episode and occurs almost every day. Typically notices this after she takes her medication in the morning, but will sometimes occur later in the day.   Denies difficulty breathing or swallowing, wheezing, swelling of face/lips/tongue, hoarse voice, etc. Advised that if these symptoms do occur, to call 911 or present to ER. Denies symptoms presently.    Started amlodipine about 3 weeks ago and this symptom started after.   Inquiring if this is due to side effect of new medication (amlodipine) or if this could possibly be related to thyroid enlargement (this is her primary suspicion).   "

## 2024-12-03 NOTE — TELEPHONE ENCOUNTER
Throat tightness isn't a common side effect with the amlodipine. So we should do some investigation.     Recommendations:  Stop amlodipine 10 mg. Replace with nifedipine ER 60 mg daily.  Thyroid ultrasound.  Follow up labs to recheck hyperparathyroidism and kidneys. She can do them when she's in the clinic next week.   Future Appointments   Date Time Provider Department Center   12/13/2024 10:30 AM Sha Ray PharmD [facility]   1/17/2025  8:20 AM Myranda Loya MD [facility]      Missy was seen today for results.    Diagnoses and all orders for this visit:    Secondary renal hyperparathyroidism (H)  -     US Thyroid; Future  -     Parathyroid Hormone Intact; Future  -     Ionized Calcium; Future  -     Vitamin D deficiency screening; Future  -     Renal panel (Alb, BUN, Ca, Cl, CO2, Creat, Gluc, Phos, K, Na); Future  -     Magnesium; Future  -     Phosphorus; Future    Chronic kidney disease, stage 4 (severe) (H)  -     Parathyroid Hormone Intact; Future  -     Ionized Calcium; Future  -     Vitamin D deficiency screening; Future  -     Renal panel (Alb, BUN, Ca, Cl, CO2, Creat, Gluc, Phos, K, Na); Future  -     Magnesium; Future  -     Phosphorus; Future    Hypertension goal BP (blood pressure) < 130/80  -     NIFEdipine ER (ADALAT CC) 60 MG 24 hr tablet; Take 1 tablet (60 mg) by mouth daily.

## 2024-12-04 ENCOUNTER — APPOINTMENT (OUTPATIENT)
Dept: INTERPRETER SERVICES | Facility: CLINIC | Age: 59
End: 2024-12-04
Payer: COMMERCIAL

## 2024-12-04 NOTE — TELEPHONE ENCOUNTER
Called patient with [facility]  May, relayed plan. Patient verbalizes agreement.    MT + Dr. Loya: Repatha--too expensive, out of pocket is $350 and patient cannot afford this. Looking for financial assistance or alternate recommendations.    Dr. Loya: Patient stopped amlodipine independently due to adverse effects.  Notes she experienced headaches, bilateral lower extremity swelling, and increased BP on amlodipine. Symptoms resolved after patient discontinued medication. Denies current symptoms.    Advised:  Stop amlodipine, start nifedipine per PCP.  Monitor BP at home, keep record and bring to MTM appointment.  Call triage line if experiencing chest pain, SOB, headache, new/worsening symptoms.  Will route update to PCP for update/additional recommendations per patient request.    Tabitha Jarrett RN  [facility]

## 2024-12-05 ENCOUNTER — APPOINTMENT (OUTPATIENT)
Dept: INTERPRETER SERVICES | Facility: CLINIC | Age: 59
End: 2024-12-05
Payer: COMMERCIAL

## 2024-12-05 NOTE — TELEPHONE ENCOUNTER
Patient has commercial insurance downloaded copay card. Provided to [facility] by phone.     Copay down to $5. Pharmacy will order medication for tomorrow.

## 2024-12-05 NOTE — TELEPHONE ENCOUNTER
First attempt: called with  & left voicemail requesting return call to clinic. Please relay message below from pharmacist regarding patient's Repatha injections.       ---------------------------------------------------------------------------  Sha Ray, Rupa to [facility]      12/5/24 10:35 AM   Please call patient. Inform that I downloaded the copay card for her and brought the Rx down to $5. She can pick it up from pharmacy tomorrow. Bring to [facility] visit as scheduled on 12/13 and we'll show her how to do first injection.

## 2024-12-13 ENCOUNTER — APPOINTMENT (OUTPATIENT)
Dept: LAB | Facility: CLINIC | Age: 59
End: 2024-12-13
Payer: COMMERCIAL

## 2024-12-17 ENCOUNTER — PATIENT OUTREACH (OUTPATIENT)
Dept: CARE COORDINATION | Facility: CLINIC | Age: 59
End: 2024-12-17
Payer: COMMERCIAL

## 2024-12-17 NOTE — PROGRESS NOTES
12/17/2024  Clinic Care Coordination Contact  Care Team Conversations  Received several messages from patient and patient's son Gaurav/Marisol to call him back about scheduling appt.    Called and spoke with patient's son and scheduled re assessment   Son stated the best day to call her is Friday. Pt is off that day.  Scheduled telephone appt with CC RN 12-20-24 at 10am for re assessment.    CHW Follow up: Monthly  CHW Plan: Follow up on goal (s)  CHW Next Follow Up: 1-24-25    Fariba Laurent  Community Health Worker  [facility] Care Coordination  [email or web address]   Office: [phone]  Fax: [phone]

## 2024-12-23 ENCOUNTER — APPOINTMENT (OUTPATIENT)
Dept: INTERPRETER SERVICES | Facility: CLINIC | Age: 59
End: 2024-12-23
Payer: COMMERCIAL

## 2024-12-23 DIAGNOSIS — N25.81 SECONDARY RENAL HYPERPARATHYROIDISM (H): Primary | ICD-10-CM

## 2025-01-02 ENCOUNTER — APPOINTMENT (OUTPATIENT)
Dept: INTERPRETER SERVICES | Facility: CLINIC | Age: 60
End: 2025-01-02
Payer: COMMERCIAL

## 2025-01-13 ENCOUNTER — PATIENT OUTREACH (OUTPATIENT)
Dept: CARE COORDINATION | Facility: CLINIC | Age: 60
End: 2025-01-13
Payer: COMMERCIAL

## 2025-01-13 NOTE — LETTER
[facility]  Patient Centered Plan of Care  About Me:        Patient Name:  Missy Michel    YOB: 1965  Age:         59 year old   Madhu MRN:    [id number] Telephone Information:  Home Phone [phone]   Mobile [phone]       Address:  [address] [place] [address] Email address:  [email or web address]      Emergency Contact(s)    Name Relationship Lgl Grd Work Phone Home Phone Mobile Phone   1. MC MICHEL Spouse    [phone]   2. MARISELA MICHEL Son    [phone]   3. ARIANNA MICHEL Son    [phone]           Primary language:  ong     needed? Yes   [place] [facility]:  [phone] op. 1  Other communication barriers:Language barrier    Preferred Method of Communication:     Current living arrangement: I live in a private home with family; I live in a private home with spouse (live wiht  and child)    Mobility Status/ Medical Equipment: Independent        Health Maintenance  Health Maintenance Reviewed: Due/Overdue   Health Maintenance Due   Topic    MAMMO SCREENING     COLORECTAL CANCER SCREENING     PHQ-2 (once per calendar year)     PAP FOLLOW-UP     MICROALBUMIN            My Access Plan  Medical Emergency 911   Primary Clinic Line [facility] [phone]   24 Hour Appointment Line [phone] or  [phone] ([phone]) (toll-free)   24 Hour Nurse Line [phone] (toll-free)   Preferred Urgent Care [facility], [phone]     [facility]  [phone]     Preferred Pharmacy [facility] - [place], [place] - [address]     Behavioral Health Crisis Line The National Suicide Prevention Lifeline at 1-634.490.5225 or Text/Call 028           My Care Team Members  Patient Care Team         Relationship Specialty Notifications Start End    Myranda Loya MD PCP - General Family Medicine  5/22/24     Referring to NEPHRO     Phone: [phone] Fax: [phone]         [address] [place] [address]    Myranda Loya MD Assigned PCP   6/16/21     Phone: [phone] Fax: [phone]         [address] [place] [address]    Fariba Laurent CHW Community Health Worker Primary Care - CC  3/15/24     Phone: [phone] Fax: [phone]         [address] [place] [address]    Carmen Enciso RN Lead Care Coordinator Primary Care - CC Admissions 5/7/24     Sha Ray, PharmD Pharmacist Pharmacist  12/13/24     Phone: [phone]          [facility] [address] [place] [address]    Sha Ray, PharmD Assigned MTM Pharmacist   12/23/24     Phone: [phone]          [facility] [address] [place] [address]                My Care Plans  Self Management and Treatment Plan    Care Plan  Care Plan: Mammogram       Problem: HP GENERAL PROBLEM       Goal: I will complete mammogram at [facility] within 1-3 months       Start Date: 7/26/2024 Expected End Date: 11/29/2024    Recent Progress: 70%    Note:     Barriers: language, access to when next annual wellness visit  Strengths: has [place], motivate to schedule  Patient expressed understanding of goal: yes  Action steps to achieve this goal:  PCP placed on order for mammogram on 11-1-24  I will wait for clinic to schedule mammogram after I see the doctor o                            Care Plan: Medical       Problem: HP GENERAL PROBLEM       Goal: I would like to review planof care with PCP within 1 month       Start Date: 12/20/2024    Note:     Barriers: Language  Strengths: family  Patient expressed understanding of goal: yes  Action steps to achieve this goal:  1. I will schedule recommended visit with PCP in January  2. I will update Care coordination team during next outreach  3. I will report to my Community Health Worker if any additional resources or support needed.                                  Action Plans on File:                        Advance Care Plans/Directives:   Advanced Care Plan/Directives on file: No    Discussed with patient/caregiver(s): Declined Further Information               My Medical and Care Information  Problem List   Patient Active Problem List   Diagnosis    Hypertension goal BP (blood pressure) < 130/80    Hyperlipidemia LDL goal <130    Overweight (BMI 25.0-29.9)    Cyst of kidney, acquired    Prediabetes    IgA nephropathy    Anemia of renal disease    Chronic kidney disease, stage 4 (severe) (H)    External hemorrhoids    Secondary renal hyperparathyroidism    Hyperuricemia    Acute bilateral back pain, unspecified back location    Elevated lipoprotein(a)      Current Medications:  Please refer to the most recent medication list provided to you by your medical team and reach out to your provider with any questions or to make any corrections.    Future Appointments   Date Time Provider Department Center   1/17/2025  8:20 AM Myranda Loya MD [facility]         Care Coordination Start Date: 3/7/2024   Frequency of Care Coordination: monthly, more frequently as needed     Form Last Updated: 01/13/2025

## 2025-01-13 NOTE — PROGRESS NOTES
Clinic Care Coordination Contact    Situation: Patient chart reviewed by care coordinator.    Background: RN CC review for care plan update     Assessment: Pt has visit scheduled with PCP   Future Appointments   Date Time Provider Department Center   1/17/2025  8:20 AM Myranda Loya MD [facility]         Plan/Recommendations: RN CC will send care plan via [employer].  Community Health Worker to outreach per standard work and updated on goal progression  RN CC will review in 4-6 weeks to support ongoing recommendations and plan of care will be available sooner if needed.

## 2025-01-17 ENCOUNTER — ANCILLARY PROCEDURE (OUTPATIENT)
Dept: MAMMOGRAPHY | Facility: CLINIC | Age: 60
End: 2025-01-17
Attending: FAMILY MEDICINE
Payer: COMMERCIAL

## 2025-01-17 DIAGNOSIS — Z12.31 VISIT FOR SCREENING MAMMOGRAM: ICD-10-CM

## 2025-01-17 PROCEDURE — 77063 BREAST TOMOSYNTHESIS BI: CPT | Mod: TC | Performed by: RADIOLOGY

## 2025-01-17 PROCEDURE — 77067 SCR MAMMO BI INCL CAD: CPT | Mod: TC | Performed by: RADIOLOGY

## 2025-01-27 ENCOUNTER — NURSE TRIAGE (OUTPATIENT)
Dept: FAMILY MEDICINE | Facility: CLINIC | Age: 60
End: 2025-01-27
Payer: COMMERCIAL

## 2025-01-27 DIAGNOSIS — E78.5 HYPERLIPIDEMIA LDL GOAL <130: ICD-10-CM

## 2025-01-27 DIAGNOSIS — I10 HYPERTENSION GOAL BP (BLOOD PRESSURE) < 130/80: Primary | ICD-10-CM

## 2025-01-27 NOTE — TELEPHONE ENCOUNTER
"Nurse Triage SBAR    Is this a 2nd Level Triage? YES, LICENSED PRACTITIONER REVIEW IS REQUIRED    Situation: Patient calling with . Patient reporting elevated BP of 175/122 yesterday and chest tightness, throat tightness, and nausea since starting Telmisartan on 1/17.    Background: Patient started Temisartan 20 MG on 1/17. She reports since taking the medication she has gotten very nauseous and a tight feeling in her throat and chest. She reports she stopped taking the medication 1/26 and reports feeling better. She believes these symptoms are from the medication Telmisartan. However, today she has elevated BP and slight tightness in her chest today 1/27.     Assessment: Patient BP on phone is 175/102. She reports mild chest tightness and nausea. The tightness in her throat is gone since she has stopped the medication yesterday. No additional symptoms. Patient has not taken BP medication since 1/25.    Protocol Recommended Disposition:   Call ADS/Go to ED/UCC Now (Or To Office with PCP Approval)    Recommendation: RN informed patient she may be advised to go to ED. Prefers [place] , however, she can take simple direction without  if needed.      Routed to provider    Does the patient meet one of the following criteria for ADS visit consideration? 16+ years old, with an [facility] PCP     TIP  Providers, please consider if this condition is appropriate for management at one of our Acute and Diagnostic Services sites.     If patient is a good candidate, please use dotphrase <dot>triageresponse and select Refer to ADS to document.    Reason for Disposition   Systolic BP >= 160 OR Diastolic >= 100, and any cardiac or neurologic symptoms (e.g., chest pain, difficulty breathing, unsteady gait, blurred vision)    Answer Assessment - Initial Assessment Questions  1. BLOOD PRESSURE: \"What is the blood pressure?\" \"Did you take at least two measurements 5 minutes apart?\"      172/102  2. ONSET: \"When " "did you take your blood pressure?\"      Started Saturday 1/25  3. HOW: \"How did you obtain the blood pressure?\" (e.g., visiting nurse, automatic home BP monitor)      Home BP cuff  4. HISTORY: \"Do you have a history of high blood pressure?\"      Yes HTN  5. MEDICATIONS: \"Are you taking any medications for blood pressure?\" \"Have you missed any doses recently?\"      Telmisartan 20 mg  6. OTHER SYMPTOMS: \"Do you have any symptoms?\" (e.g., headache, chest pain, blurred vision, difficulty breathing, weakness)      Chest tightness and nausea   7. PREGNANCY: \"Is there any chance you are pregnant?\" \"When was your last menstrual period?\"      no    Protocols used: High Blood Pressure-A-OH    "

## 2025-01-27 NOTE — TELEPHONE ENCOUNTER
I think is good to get an evaluation in the ED because of the onset of chest tightness and the very elevated blood pressure.

## 2025-01-28 RX ORDER — METOPROLOL SUCCINATE 50 MG/1
50 TABLET, EXTENDED RELEASE ORAL DAILY
Qty: 30 TABLET | Refills: 11 | Status: SHIPPED | OUTPATIENT
Start: 2025-01-28

## 2025-01-28 NOTE — CONFIDENTIAL NOTE
Stop telmisartan  Start metoprolol XL 50 mg.    Referral made to [facility] pharmacist to review options for replacement for cholesterol due to high copay for Repatha (evolocumab) and statin/ezetimibe intolerance.    Missy was seen today for hypertension.    Diagnoses and all orders for this visit:    Hypertension goal BP (blood pressure) < 130/80  -     metoprolol succinate ER (TOPROL XL) 50 MG 24 hr tablet; Take 1 tablet (50 mg) by mouth daily.    Hyperlipidemia LDL goal <130  -     Med Therapy Management Referral        Office Visit on 01/17/2025   Component Date Value Ref Range Status    Phosphorus 01/17/2025 4.2  2.5 - 4.5 mg/dL Final    Creatinine Urine mg/dL 01/17/2025 51.4  mg/dL Final    The reference ranges have not been established in urine creatinine. The results should be integrated into the clinical context for interpretation.    Albumin Urine mg/L 01/17/2025 1,893.0  mg/L Final    The reference ranges have not been established in urine albumin. The results should be integrated into the clinical context for interpretation.    Albumin Urine mg/g Cr 01/17/2025 3,682.88 (H)  0.00 - 25.00 mg/g Cr Final    Microalbuminuria is defined as an albumin:creatinine ratio of 17 to 299 for males and 25 to 299 for females. A ratio of albumin:creatinine of 300 or higher is indicative of overt proteinuria.  Due to biologic variability, positive results should be confirmed by a second, first-morning random or 24-hour timed urine specimen. If there is discrepancy, a third specimen is recommended. When 2 out of 3 results are in the microalbuminuria range, this is evidence for incipient nephropathy and warrants increased efforts at glucose control, blood pressure control, and institution of therapy with an angiotensin-converting-enzyme (ACE) inhibitor (if the patient can tolerate it).      Cholesterol 01/17/2025 246 (H)  <200 mg/dL Final    Triglycerides 01/17/2025 135  <150 mg/dL Final    Direct Measure HDL 01/17/2025 76  >=50  mg/dL Final    LDL Cholesterol Calculated 01/17/2025 143 (H)  <100 mg/dL Final    Non HDL Cholesterol 01/17/2025 170 (H)  <130 mg/dL Final    Sodium 01/17/2025 140  135 - 145 mmol/L Final    Potassium 01/17/2025 4.2  3.4 - 5.3 mmol/L Final    Chloride 01/17/2025 107  98 - 107 mmol/L Final    Carbon Dioxide (CO2) 01/17/2025 23  22 - 29 mmol/L Final    Anion Gap 01/17/2025 10  7 - 15 mmol/L Final    Glucose 01/17/2025 103 (H)  70 - 99 mg/dL Final    Urea Nitrogen 01/17/2025 51.6 (H)  6.0 - 20.0 mg/dL Final    Creatinine 01/17/2025 3.34 (H)  0.51 - 0.95 mg/dL Final    GFR Estimate 01/17/2025 15 (L)  >60 mL/min/1.73m2 Final    eGFR calculated using 2021 CKD-EPI equation.    Calcium 01/17/2025 8.9  8.8 - 10.4 mg/dL Final    Reference intervals for this test were updated on 7/16/2024 to reflect our healthy population more accurately. There may be differences in the flagging of prior results with similar values performed with this method. Those prior results can be interpreted in the context of the updated reference intervals.    Albumin 01/17/2025 4.0  3.5 - 5.2 g/dL Final    Phosphorus 01/17/2025 4.2  2.5 - 4.5 mg/dL Final          Allergies   Allergen Reactions    Telmisartan Angioedema    Amlodipine Angioedema and Swelling     Feels like squeezing in neck (angioedema?) and had swelling in legs    Benazepril Itching and Headache    Nifedipine Angioedema and Swelling     Sensation of something pushing in her neck/strangling her. Leg swelling.     Atorvastatin Muscle Pain (Myalgia)    Ezetimibe Other (See Comments)     Arthralgia - painful swollen joints in hands/feet. Couldn't walk due to pain. Known side effect in 2-3% of users.    Ibuprofen Nausea    Pravastatin Muscle Pain (Myalgia)    Rosuvastatin Muscle Pain (Myalgia)    Sulfamethoxazole-Trimethoprim Nausea and Vomiting and Diarrhea     6/7/24. Felt so sick she couldn't finish medication or get out of bed.      BP Readings from Last 6 Encounters:   01/17/25 (!)  149/84   12/13/24 126/76   11/21/24 122/74   11/01/24 132/78   09/06/24 136/82   07/23/24 127/78

## 2025-01-28 NOTE — TELEPHONE ENCOUNTER
Patient returned call and writer relayed Dr. Loya's message. She verbalized understanding.      FERNANDA Nicolas Cem, RN  [facility]    ----------------------------------------------------------------------------------  Stop telmisartan  Start metoprolol XL 50 mg.     Referral made to [facility] pharmacist to review options for replacement for cholesterol due to high copay for Repatha (evolocumab) and statin/ezetimibe intolerance.     Missy was seen today for hypertension.     Diagnoses and all orders for this visit:     Hypertension goal BP (blood pressure) < 130/80  -     metoprolol succinate ER (TOPROL XL) 50 MG 24 hr tablet; Take 1 tablet (50 mg) by mouth daily.     Hyperlipidemia LDL goal <130  -     Med Therapy Management Referral

## 2025-01-28 NOTE — CONFIDENTIAL NOTE
Please touch base with Missy to see how she's doing. It looks like she didn't go in.    She should definitely stop her new medication, telmisartan, and get seen today if BP is still high and having tightness.     I added temisartan to her allergy list.    Future Appointments   Date Time Provider Department Center   2/21/2025 10:00 AM Myranda Loya MD [facility]

## 2025-01-28 NOTE — TELEPHONE ENCOUNTER
RN attempted to contact patient using an , but no answer.  left message on patient's voice mail to call clinic back.    Message routed to Dr Loya for medication review.     Roslyn Montana RN  [facility]    Dr Loya,     RN relayed Dr. Bonilla's SLT recommendation to proceed to ED. Pt is very hesitant to seek ED due to the long wait but eventually agreed.     Pt want to let you know that she is not taking evolocumab due to  high co-pay of $500. Pt also stop taking telmisartan due to side effect throat swelling. Pt is requesting alternative to both of these medications.

## 2025-01-28 NOTE — TELEPHONE ENCOUNTER
Dr Loya,     RN relayed Dr. Bonilla's SLT recommendation to proceed to ED. Pt is very hesitant to seek ED due to the long wait but eventually agreed.     Pt want to let you know that she is not taking evolocumab due to  high co-pay of $500. Pt also stop taking telmisartan due to side effect throat swelling. Pt is requesting alternative to both of these medications.     Debby HORNER RN  [facility]

## 2025-01-28 NOTE — TELEPHONE ENCOUNTER
Left voicemail requesting patient return call. Please relay clinician message should patient call back.    Tabitha Jarrett RN  [facility] : [phone]

## 2025-02-04 ENCOUNTER — TELEPHONE (OUTPATIENT)
Dept: FAMILY MEDICINE | Facility: CLINIC | Age: 60
End: 2025-02-04
Payer: COMMERCIAL

## 2025-02-04 NOTE — TELEPHONE ENCOUNTER
----- Message from Myranda Loya sent at 2/4/2025  3:55 PM CST -----  Please let Missy know:    The blood tests for the kidneys are continuing to worsen. But the amount of acid in the blood is good right now with what you're using.   Her urine has a lot of protein in it; a sign that her kidneys aren't working well.  Her cholesterol is MUCH better. It's still a bit higher than we'd like so keep working on healthy diet and activity.    Make sure to follow up with getting your dialysis fistula so you're ready when you need it.   We'll see you in a couple of weeks.    Future Appointments  2/21/2025  10:00 AM   Myranda Loya MD         [facility]  3/7/2025   8:30 AM    Sha Ray, PharmD  [facility]

## 2025-02-05 NOTE — TELEPHONE ENCOUNTER
Spoke to pt, relayed clinician's message and recommendations in Hmong. Pt verbalized understanding and agreed with plans and has no questions or concerns.      Debby HORNER RN  [facility]      ----- Message from Myranda Loya sent at 2/4/2025  3:55 PM CST -----  Please let Missy know:    The blood tests for the kidneys are continuing to worsen. But the amount of acid in the blood is good right now with what you're using.   Her urine has a lot of protein in it; a sign that her kidneys aren't working well.  Her cholesterol is MUCH better. It's still a bit higher than we'd like so keep working on healthy diet and activity.    Make sure to follow up with getting your dialysis fistula so you're ready when you need it.   We'll see you in a couple of weeks.    Future Appointments  2/21/2025  10:00 AM   Myranda Loya MD         [facility]  3/7/2025   8:30 AM    Sha Ray, PharmD  [facility]

## 2025-02-11 ENCOUNTER — APPOINTMENT (OUTPATIENT)
Dept: INTERPRETER SERVICES | Facility: CLINIC | Age: 60
End: 2025-02-11
Payer: COMMERCIAL

## 2025-02-11 ENCOUNTER — PATIENT OUTREACH (OUTPATIENT)
Dept: CARE COORDINATION | Facility: CLINIC | Age: 60
End: 2025-02-11
Payer: COMMERCIAL

## 2025-02-11 NOTE — PROGRESS NOTES
2/11/2025  Clinic Care Coordination Contact  Community Health Worker Follow Up    Care Gaps:     Health Maintenance Due   Topic Date Due    COLORECTAL CANCER SCREENING  03/26/2024    PAP FOLLOW-UP  01/17/2025    HEMOGLOBIN  05/01/2025       Care Gaps Last addressed on 2-11-25, Care Gap Goal set: Yes, and Scheduled 2-21-25      Care Plan:   Care Plan: 1. FRW Goal: Obtain medical insurance completed 4-12-24 Completed 4/12/2024      Problem: Lack of medical insurance  Resolved 4/12/2024      Goal: I have health insurance through my workplace.  Completed 4/12/2024      Start Date: 3/15/2024 Expected End Date: 6/15/2024    This Visit's Progress: 100%    Note:     Strengths:  patient is enrolled with our Financial Resource Worker  Barriers: patients MA with [facility] ended 2/29/24  Patient expressed understanding of goal: yes  Action steps to achieve this goal:  Per FRW note patient not eligible for MA due to over income but has insurance through workplace.                                     Care Plan: Yearly Preventative Visit completed 7-23-24 Completed 7/26/2024      Problem: HP GENERAL PROBLEM  Resolved 7/26/2024      Goal: I attended and completed Yearly Preventative visit with the doctor on 7-23-24.  Completed 7/26/2024      Start Date: 5/29/2024 Expected End Date: 7/31/2024    This Visit's Progress: 100% Recent Progress: 90%    Note:     Barriers: language, access to when next annual wellness visit  Strengths: has MA, motivate to schedule  Patient expressed understanding of goal: yes  Action steps to achieve this goal:  I will continue to  schedule and complete Yearly Preventative visit with the doctor as scheduled.  [facility] [address] [place] [address]  [phone]  24/7 Care Connection to connect with Triage nurse [phone]    [address] [address]  [phone]  Walk-in Care Hours   Monday - Friday, 7:00 a.m. to  7:00 p.m.   Saturday & Sunday, 8:00 a.m. to 4:00 p.m                                Care Plan: Mammogram       Problem: HP GENERAL PROBLEM       Goal: I completed mammogram at [facility] on 1-17-25  Completed 2/11/2025      Start Date: 7/26/2024 Expected End Date: 11/29/2024    This Visit's Progress: 100% Recent Progress: 70%    Note:     Personal Plan:  I will call [phone] to schedule mamm[facility] as [facility]                    Goal: I attended appt with PCP on 11-1-24  Completed 11/18/2024      Recent Progress: 100%                          Care Plan: Medical       Problem: HP GENERAL PROBLEM       Goal: I would like to review planof care with PCP within 1 month       Start Date: 12/20/2024    This Visit's Progress: 90%    Note:     Barriers: Language  Strengths: family  Patient expressed understanding of goal: yes  Action steps to achieve this goal:  I attended appt with PCP on January 17, 2025  I will follow up with my doctor on 2-21-25 at 9:40am to discuss care gaps and plan of care. Updated 2-11-25 AL  I will talk to CC RN on 2-27-25 at 4:15pm for support with plan of care. Updated 2-11-25 AL                            Intervention and Education during outreach:   [employer] Madhu Gracia : Antonio    Called and spoke with patient and follow up on goal(s).  Patient reported:  -completed mammogram last month 1-17-25  Goal completed.  -saw the doctor for check on 1-17-25.  She spoke with the nurse about the lab result.  Has appt with the doctor on 2-21-25 at 9:40am to discuss plan of care.  Has an appt with Rupa Dalton in March about her medications.  Offered to schedule with appt with CC RN for support to understand plan of care after visit with PCP on 2-21-25.  Patient agreed to schedule but that she works so the best time to call is after 4pm like 4:15pm.  CHW scheduled with CC RN 2-27-25 at 4:15pm support plan of care after PCP visit 2-21-25    Routed to CC  RN review note and re assess goal and if appropriate to transition to maintenance    CHW Follow up: Monthly  CHW Plan: Follow up on goal  CHW Next Follow Up: 3-27-25    Fariba Laurent  [facility] Health Worker  [facility] Care Coordination  [email or web address]   Office: [phone]  Fax: [phone]      Fariba Laurent  Count includes the [facility] Worker  [facility] Care Coordination  [email or web address]   Office: [phone]  Fax: [phone]

## 2025-02-11 NOTE — Clinical Note
Appt with CC RN 2-27-25 at 4:15pm support plan of care after PCP visit 2-21-25 Patient agreed to schedule but that she works until 4pm so the best time to call is after 4pm like 4:15pm. review goal and if appropriate to complete medical goal and transition to maintenance.

## 2025-02-18 DIAGNOSIS — I10 HYPERTENSION GOAL BP (BLOOD PRESSURE) < 130/80: ICD-10-CM

## 2025-02-19 RX ORDER — METOPROLOL SUCCINATE 50 MG/1
50 TABLET, EXTENDED RELEASE ORAL DAILY
Qty: 30 TABLET | Refills: 11 | OUTPATIENT
Start: 2025-02-19

## 2025-02-19 NOTE — TELEPHONE ENCOUNTER
Medication Question or Refill    Contacts       Contact Date/Time Type Contact Phone/Fax    02/18/2025 06:02 PM CST Phone (Incoming) Missy Vance (Self) [phone] (M)            What medication are you calling about (include dose and sig)? Toprol Xl  tablets     Preferred Pharmacy:   [facility] - [place], [place] - [address]  [address]  [address]  [place] [address]  Phone: [phone] Fax: [phone]      Controlled Substance Agreement on file:   CSA -- Patient Level:    CSA: None found at the patient level.       Who prescribed the medication?:  Dr Loya    Do you need a refill? Yes    When d id you use the medication last?  Today. I have 6 pills left    Patient offered an appointment? Yes:    Do you have any questions or concerns?  No      Could we send this information to you in [facility] or would you prefer to receive a phone call?:   Patient would prefer a phone call   Okay to leave a detailed message?: Yes at Cell number on file:    Telephone Information:   Mobile [phone]

## 2025-02-27 ENCOUNTER — PATIENT OUTREACH (OUTPATIENT)
Dept: NURSING | Facility: CLINIC | Age: 60
End: 2025-02-27
Payer: COMMERCIAL

## 2025-02-27 NOTE — PROGRESS NOTES
Clinic Care Coordination Contact  Follow Up Progress Note      Assessment: RN CC outreach and spoke with pt   Pt canceled and reschdule visit with PCP  Chart review notes pt has a visit with MTM next week, reviewed details   Pt notes that she is taking the Metoprolol XR - HA now      I relayed message from Provider on 2.5  and noting canceled visit on 2.21 and discussed that consideration for visit sooner that April if PCP advised         Care Gaps:    Health Maintenance Due   Topic Date Due    COLORECTAL CANCER SCREENING  03/26/2024    PAP FOLLOW-UP  01/17/2025    HEMOGLOBIN  05/01/2025       Scheduled in April      Care Plans  Care Plan: 1. FRW Goal: Obtain medical insurance completed 4-12-24 Completed 4/12/2024      Problem: Lack of medical insurance  Resolved 4/12/2024      Goal: I have health insurance through my workplace.  Completed 4/12/2024      Start Date: 3/15/2024 Expected End Date: 6/15/2024    This Visit's Progress: 100%    Note:     Strengths:  patient is enrolled with our Financial Resource Worker  Barriers: patients MA with [facility] ended 2/29/24  Patient expressed understanding of goal: yes  Action steps to achieve this goal:  Per FRW note patient not eligible for MA due to over income but has insurance through workplace.                                     Care Plan: Yearly Preventative Visit completed 7-23-24 Completed 7/26/2024      Problem: HP GENERAL PROBLEM  Resolved 7/26/2024      Goal: I attended and completed Yearly Preventative visit with the doctor on 7-23-24.  Completed 7/26/2024      Start Date: 5/29/2024 Expected End Date: 7/31/2024    This Visit's Progress: 100% Recent Progress: 90%    Note:     Barriers: language, access to when next annual wellness visit  Strengths: has MA, motivate to schedule  Patient expressed understanding of goal: yes  Action steps to achieve this goal:  I will continue to  schedule and complete Yearly Preventative visit with the doctor as scheduled.  M  [facility]   [address] [place] [address]  [phone]  24/7 Care Connection to connect with Triage nurse [phone]    [facility]  [address] [place] [address]  [phone]  Walk-in Care Hours   Monday - Friday, 7:00 a.m. to 7:00 p.m.   Saturday & Sunday, 8:00 a.m. to 4:00 p.m                                Care Plan: Mammogram Completed 2/27/2025      Problem: HP GENERAL PROBLEM  Resolved 2/27/2025      Goal: I completed mammogram at [facility] on 1-17-25  Completed 2/11/2025      Start Date: 7/26/2024 Expected End Date: 11/29/2024    This Visit's Progress: 100% Recent Progress: 70%    Note:     Personal Plan:  I will call [phone] to schedule mammogram as recommended or [facility]                    Goal: I attended appt with PCP on 11-1-24  Completed 11/18/2024      Recent Progress: 100%                          Care Plan: Medical       Problem: HP GENERAL PROBLEM       Goal: I would like to review planof care with PCP within 1 month       Start Date: 12/20/2024    Recent Progress: 90%    Note:     Barriers: Language  Strengths: family  Patient expressed understanding of goal: yes  Action steps to achieve this goal:  I attended appt with PCP on January 17, 2025  I will follow up with my doctor  to discuss care gaps and plan of care.    I will talk to CC RN   for support with plan of care.                 Goal: General Goal - please update text                           Care Plan: Medication Regimen       Problem: Medication Adherence       Goal: Consistently take Medications as Prescribed                             Intervention/Education provided during outreach: Patient verbalized understanding via interpretive services. Engaged in [employer] communication during visit               Plan:  Patient will schedule and attend recommended follow up visits with speciality providers and primary care provider.  [facility]  Worker to outreach per standard work and updated on goal progression  RN CC will review in 4-6 weeks to support ongoing recommendations and plan of care will be available sooner if needed.

## 2025-02-27 NOTE — Clinical Note
I had a planned outreach to pt today as she was originally schedle to see PCP earlier this week. She canceled and reschedule visit . I noted that she is coming to See MTM next week and she will bring medications. Of note, since starting the Metroprolol she reports more HA. Will discuss at visit. . Her PCP visit is now in April, I wanted you to review to see if ok to wait that long or if labs needed, etc before visit.. I will plan on reaching out after MTM visit to support recommendations and follow up.  Thanks Carmen BEAN

## 2025-04-14 ENCOUNTER — OFFICE VISIT (OUTPATIENT)
Dept: FAMILY MEDICINE | Facility: CLINIC | Age: 60
End: 2025-04-14
Payer: COMMERCIAL

## 2025-04-14 VITALS
RESPIRATION RATE: 16 BRPM | WEIGHT: 141 LBS | SYSTOLIC BLOOD PRESSURE: 154 MMHG | TEMPERATURE: 97.5 F | DIASTOLIC BLOOD PRESSURE: 86 MMHG | BODY MASS INDEX: 28.43 KG/M2 | HEART RATE: 60 BPM | OXYGEN SATURATION: 98 % | HEIGHT: 59 IN

## 2025-04-14 DIAGNOSIS — G44.209 TENSION HEADACHE: ICD-10-CM

## 2025-04-14 DIAGNOSIS — E78.5 HYPERLIPIDEMIA LDL GOAL <130: ICD-10-CM

## 2025-04-14 DIAGNOSIS — N18.4 CHRONIC KIDNEY DISEASE, STAGE 4 (SEVERE) (H): Primary | ICD-10-CM

## 2025-04-14 DIAGNOSIS — N18.9 ANEMIA OF RENAL DISEASE: ICD-10-CM

## 2025-04-14 DIAGNOSIS — D63.1 ANEMIA OF RENAL DISEASE: ICD-10-CM

## 2025-04-14 DIAGNOSIS — I10 HYPERTENSION GOAL BP (BLOOD PRESSURE) < 130/80: ICD-10-CM

## 2025-04-14 LAB
ALBUMIN SERPL BCG-MCNC: 3.8 G/DL (ref 3.5–5.2)
ALBUMIN UR-MCNC: >=300 MG/DL
ANION GAP SERPL CALCULATED.3IONS-SCNC: 13 MMOL/L (ref 7–15)
APPEARANCE UR: CLEAR
BACTERIA #/AREA URNS HPF: ABNORMAL /HPF
BILIRUB UR QL STRIP: NEGATIVE
BUN SERPL-MCNC: 48.3 MG/DL (ref 6–20)
CALCIUM SERPL-MCNC: 8.8 MG/DL (ref 8.8–10.4)
CHLORIDE SERPL-SCNC: 103 MMOL/L (ref 98–107)
CHOLEST SERPL-MCNC: 276 MG/DL
COLOR UR AUTO: YELLOW
CREAT SERPL-MCNC: 3.66 MG/DL (ref 0.51–0.95)
EGFRCR SERPLBLD CKD-EPI 2021: 14 ML/MIN/1.73M2
FASTING STATUS PATIENT QL REPORTED: YES
GLUCOSE SERPL-MCNC: 97 MG/DL (ref 70–99)
GLUCOSE UR STRIP-MCNC: NEGATIVE MG/DL
HCO3 SERPL-SCNC: 24 MMOL/L (ref 22–29)
HDLC SERPL-MCNC: 74 MG/DL
HGB BLD-MCNC: 11.1 G/DL (ref 11.7–15.7)
HGB UR QL STRIP: ABNORMAL
KETONES UR STRIP-MCNC: NEGATIVE MG/DL
LDLC SERPL CALC-MCNC: 180 MG/DL
LEUKOCYTE ESTERASE UR QL STRIP: NEGATIVE
MAGNESIUM SERPL-MCNC: 1.9 MG/DL (ref 1.7–2.3)
NITRATE UR QL: NEGATIVE
NONHDLC SERPL-MCNC: 202 MG/DL
PH UR STRIP: 7 [PH] (ref 5–8)
PHOSPHATE SERPL-MCNC: 4.5 MG/DL (ref 2.5–4.5)
POTASSIUM SERPL-SCNC: 4.4 MMOL/L (ref 3.4–5.3)
RBC #/AREA URNS AUTO: ABNORMAL /HPF
SODIUM SERPL-SCNC: 140 MMOL/L (ref 135–145)
SP GR UR STRIP: 1.02 (ref 1–1.03)
SQUAMOUS #/AREA URNS AUTO: ABNORMAL /LPF
TRIGL SERPL-MCNC: 110 MG/DL
UROBILINOGEN UR STRIP-ACNC: 0.2 E.U./DL
WBC #/AREA URNS AUTO: ABNORMAL /HPF

## 2025-04-14 PROCEDURE — 99214 OFFICE O/P EST MOD 30 MIN: CPT | Performed by: FAMILY MEDICINE

## 2025-04-14 PROCEDURE — G2211 COMPLEX E/M VISIT ADD ON: HCPCS | Performed by: FAMILY MEDICINE

## 2025-04-14 PROCEDURE — 82043 UR ALBUMIN QUANTITATIVE: CPT | Performed by: FAMILY MEDICINE

## 2025-04-14 PROCEDURE — 80069 RENAL FUNCTION PANEL: CPT | Performed by: FAMILY MEDICINE

## 2025-04-14 PROCEDURE — 3077F SYST BP >= 140 MM HG: CPT | Performed by: FAMILY MEDICINE

## 2025-04-14 PROCEDURE — 3079F DIAST BP 80-89 MM HG: CPT | Performed by: FAMILY MEDICINE

## 2025-04-14 PROCEDURE — 80061 LIPID PANEL: CPT | Performed by: FAMILY MEDICINE

## 2025-04-14 PROCEDURE — 36415 COLL VENOUS BLD VENIPUNCTURE: CPT | Performed by: FAMILY MEDICINE

## 2025-04-14 PROCEDURE — 87086 URINE CULTURE/COLONY COUNT: CPT | Performed by: FAMILY MEDICINE

## 2025-04-14 PROCEDURE — 82570 ASSAY OF URINE CREATININE: CPT | Performed by: FAMILY MEDICINE

## 2025-04-14 PROCEDURE — 85018 HEMOGLOBIN: CPT | Performed by: FAMILY MEDICINE

## 2025-04-14 PROCEDURE — 81001 URINALYSIS AUTO W/SCOPE: CPT | Performed by: FAMILY MEDICINE

## 2025-04-14 PROCEDURE — 83735 ASSAY OF MAGNESIUM: CPT | Performed by: FAMILY MEDICINE

## 2025-04-14 PROCEDURE — T1013 SIGN LANG/ORAL INTERPRETER: HCPCS

## 2025-04-14 RX ORDER — DOXAZOSIN 1 MG/1
1 TABLET ORAL AT BEDTIME
Qty: 90 TABLET | Refills: 4 | Status: SHIPPED | OUTPATIENT
Start: 2025-04-14 | End: 2025-04-14

## 2025-04-14 RX ORDER — ACETAMINOPHEN 500 MG
1000 TABLET ORAL EVERY 6 HOURS PRN
Qty: 200 TABLET | Refills: 5 | Status: SHIPPED | OUTPATIENT
Start: 2025-04-14

## 2025-04-14 RX ORDER — METOPROLOL SUCCINATE 100 MG/1
100 TABLET, EXTENDED RELEASE ORAL DAILY
Qty: 30 TABLET | Refills: 1 | Status: SHIPPED | OUTPATIENT
Start: 2025-04-14 | End: 2025-04-14

## 2025-04-14 RX ORDER — METOPROLOL SUCCINATE 50 MG/1
25 TABLET, EXTENDED RELEASE ORAL DAILY
Qty: 45 TABLET | Refills: 1 | Status: SHIPPED | OUTPATIENT
Start: 2025-04-14

## 2025-04-14 RX ORDER — DOXAZOSIN 2 MG/1
2 TABLET ORAL AT BEDTIME
Qty: 90 TABLET | Refills: 1 | Status: SHIPPED | OUTPATIENT
Start: 2025-04-14

## 2025-04-14 NOTE — PROGRESS NOTES
"Office Visit  [facility]  Myranda Loya M.D. Family Medicine  Date of Service: Apr 14, 2025      Subjective   Missy Vance is a 59 year old female who presents for   Chief Complaint   Patient presents with    Follow Up     Medications and cholesterol    Urinary Problem     Sates that she feel her bladder not full empty after urinating.     Discuss colon cancer screening     States that she has the cologuard kit at home, she will do and send to us.       Hypertension  Current meds:  Betablocker - On low dose metoprolol 25 mg - bradycardia limits increase  Alpha blockers - tolerating doxazosin 1 mg qhs  Future options: methyldopa, hydralazine, aliskiren  Contraindicated:  Diuretic - CKD4  Calcium channel blocker - angioedema  ACE/ARBS - angioedema  Mineralocorticoid receptor antagionist - CKD4    Headache - whole head, starts on temples, goes to back of head. Not associated with neck/back pain     - couldn't get meds from pharmacy  Current Outpatient Medications   Medication Instructions    allopurinol (ZYLOPRIM) 150 mg, Oral, DAILY    cetirizine (ZYRTEC) 10 mg, Oral, DAILY, For itching    doxazosin (CARDURA) 1 mg, Oral, AT BEDTIME    evolocumab (REPATHA) 140 mg, Subcutaneous, EVERY 14 DAYS    metoprolol succinate ER (TOPROL XL) 25 mg, Oral, DAILY    Sodium Bicarbonate, antacid, POWD 3 g, Oral, DAILY, Use 1/4th of a teaspoon of sodium bicarbonate powder dissolved in 8 ounces of water daily.      Objective   BP (!) 154/86 (BP Location: Right arm, Patient Position: Sitting, Cuff Size: Adult Regular)   Pulse 60   Temp 97.5  F (36.4  C) (Oral)   Resp 16   Ht 1.5 m (4' 11.06\")   Wt 64 kg (141 lb)   LMP  (LMP Unknown)   SpO2 98%   BMI 28.43 kg/m   She reports that she has never smoked. She has never been exposed to tobacco smoke. She has never used smokeless tobacco.  BP Readings from Last 6 Encounters:   04/14/25 (!) 154/86   03/07/25 (!) 178/89   01/17/25 (!) 149/84   12/13/24 " 126/76   11/21/24 122/74   11/01/24 132/78     Pulse Readings from Last 6 Encounters:   04/14/25 60   03/07/25 50   01/17/25 68   12/13/24 79   11/01/24 71   09/06/24 65       Gen: Alert, no apparent distress.    Results for orders placed or performed in visit on 04/14/25   Urine Macroscopic with reflex to Microscopic     Status: Abnormal   Result Value Ref Range    Color Urine Yellow Colorless, Straw, Light Yellow, Yellow    Appearance Urine Clear Clear    Glucose Urine Negative Negative mg/dL    Bilirubin Urine Negative Negative    Ketones Urine Negative Negative mg/dL    Specific Gravity Urine 1.020 1.005 - 1.030    Blood Urine Trace (A) Negative    pH Urine 7.0 5.0 - 8.0    Protein Albumin Urine >=300 (A) Negative mg/dL    Urobilinogen Urine 0.2 0.2, 1.0 E.U./dL    Nitrite Urine Negative Negative    Leukocyte Esterase Urine Negative Negative   Urine Microscopic Exam     Status: Abnormal   Result Value Ref Range    Bacteria Urine Few (A) None Seen /HPF    RBC Urine 2-5 (A) 0-2 /HPF /HPF    WBC Urine 0-5 0-5 /HPF /HPF    Squamous Epithelials Urine Few (A) None Seen /LPF   Hemoglobin     Status: Abnormal   Result Value Ref Range    Hemoglobin 11.1 (L) 11.7 - 15.7 g/dL     Assessment & Plan     Hypertension, uncontrolled.  Increase doxazosin to 2 mg at bedtime. Follow up with pharmacist 5/16/25. Consider future addition of methyldopa, hydralazine or aliskiren as needed.   CKD4  Check labs today.   Tension headache  Massage, stretch, tylenol as needed.        Order Summary                                                      Chronic kidney disease, stage 4 (severe) (H)  -     Renal panel; Future  -     Phosphorus; Future  -     Magnesium; Future  -     Urine Culture; Future  -     Urine Macroscopic with reflex to Microscopic; Future  -     Urine Culture  -     Urine Macroscopic with reflex to Microscopic  -     Urine Microscopic Exam  -     Albumin Random Urine Quantitative with Creat Ratio; Future  -     Renal panel  (Alb, BUN, Ca, Cl, CO2, Creat, Gluc, Phos, K, Na)  -     Magnesium  -     Albumin Random Urine Quantitative with Creat Ratio    Hyperlipidemia LDL goal <130  -     Lipid panel reflex to direct LDL Fasting; Future  -     Lipid panel reflex to direct LDL Fasting    Anemia of renal disease  -     Hemoglobin; Future  -     Hemoglobin    Hypertension goal BP (blood pressure) < 130/80  -     metoprolol succinate ER (TOPROL XL) 50 MG 24 hr tablet; Take 0.5 tablets (25 mg) by mouth daily.  -     doxazosin (CARDURA) 2 MG tablet; Take 1 tablet (2 mg) by mouth at bedtime.    Tension headache  -     acetaminophen (TYLENOL) 500 MG tablet; Take 2 tablets (1,000 mg) by mouth every 6 hours as needed for mild pain.    Other orders  -     PRIMARY CARE FOLLOW-UP SCHEDULING; Future            Future Appointments   Date Time Provider Department Center   5/16/2025  3:00 PM Sha Ray, PharmD [facility]       Completed by: Myranda Loya M.D., [facility]. 4/14/2025 9:30 AM. The longitudinal plan of care for the diagnosis(es)/condition(s) as documented were addressed during this visit. Due to the added complexity in care, I will continue to support Missy in the subsequent management and with ongoing continuity of care.   This transcription uses voice recognition software, which may contain typographical errors.  MDM: DARIUS neighborhood: [facility] [address], language: Hmong..  Answers submitted by the patient for this visit:  Lipid Visit (Submitted on 4/14/2025)  Chief Complaint: Chronic problems general questions HPI Form  Are you regularly taking any medication or supplement to lower your cholesterol?: Yes  Are you having muscle aches or other side effects that you think could be caused by your cholesterol lowering medication?: No  General Questionnaire (Submitted on 4/14/2025)  Chief Complaint: Chronic problems general questions HPI Form  What is the reason for your visit today? : Follow up cholesterol and  medication. Urinate problem  How many servings of fruits and vegetables do you eat daily?: 2-3  On average, how many sweetened beverages do you drink each day (Examples: soda, juice, sweet tea, etc.  Do NOT count diet or artificially sweetened beverages)?: 0  How many minutes a day do you exercise enough to make your heart beat faster?: 10 to 19  How many days a week do you exercise enough to make your heart beat faster?: 5  How many days per week do you miss taking your medication?: 0  Questionnaire about: Chronic problems general questions HPI Form (Submitted on 4/14/2025)  Chief Complaint: Chronic problems general questions HPI Form

## 2025-04-14 NOTE — PATIENT INSTRUCTIONS
High Blood Pressure  Continue 1/2 pill of metoprolol 50 mg.  Increase doxazosin to 2 mg at bedtime.    Headache  Use massage.  Use tylenol as needed.    Kidneys  Check labs today.

## 2025-04-14 NOTE — PROGRESS NOTES
Prior to immunization administration, verified patients identity using patient s name and date of birth. Please see Immunization Activity for additional information.     Screening Questionnaire for Adult Immunization    Are you sick today?   No   Do you have allergies to medications, food, a vaccine component or latex?   Yes   Have you ever had a serious reaction after receiving a vaccination?   No   Do you have a long-term health problem with heart, lung, kidney, or metabolic disease (e.g., diabetes), asthma, a blood disorder, no spleen, complement component deficiency, a cochlear implant, or a spinal fluid leak?  Are you on long-term aspirin therapy?   Yes   Do you have cancer, leukemia, HIV/AIDS, or any other immune system problem?   No   Do you have a parent, brother, or sister with an immune system problem?   No   In the past 3 months, have you taken medications that affect  your immune system, such as prednisone, other steroids, or anticancer drugs; drugs for the treatment of rheumatoid arthritis, Crohn s disease, or psoriasis; or have you had radiation treatments?   No   Have you had a seizure, or a brain or other nervous system problem?   No   During the past year, have you received a transfusion of blood or blood    products, or been given immune (gamma) globulin or antiviral drug?   No   For women: Are you pregnant or is there a chance you could become       pregnant during the next month?   No   Have you received any vaccinations in the past 4 weeks?   No     Immunization questionnaire was positive for at least one answer.  Notified provider.      Patient instructed to remain in clinic for 15 minutes afterwards, and to report any adverse reactions.     Screening performed by Gabe Andre MA on 4/14/2025 at 8:59 AM.        Answers submitted by the patient for this visit:  Lipid Visit (Submitted on 4/14/2025)  Chief Complaint: Chronic problems general questions HPI Form  Are you regularly taking any medication  or supplement to lower your cholesterol?: Yes  Are you having muscle aches or other side effects that you think could be caused by your cholesterol lowering medication?: No  General Questionnaire (Submitted on 4/14/2025)  Chief Complaint: Chronic problems general questions HPI Form  What is the reason for your visit today? : Follow up cholesterol and medication. Urinate problem  How many servings of fruits and vegetables do you eat daily?: 2-3  On average, how many sweetened beverages do you drink each day (Examples: soda, juice, sweet tea, etc.  Do NOT count diet or artificially sweetened beverages)?: 0  How many minutes a day do you exercise enough to make your heart beat faster?: 10 to 19  How many days a week do you exercise enough to make your heart beat faster?: 5  How many days per week do you miss taking your medication?: 0  Questionnaire about: Chronic problems general questions HPI Form (Submitted on 4/14/2025)  Chief Complaint: Chronic problems general questions HPI Form

## 2025-04-15 LAB
BACTERIA UR CULT: NORMAL
CREAT UR-MCNC: 60.7 MG/DL
MICROALBUMIN UR-MCNC: 2032 MG/L
MICROALBUMIN/CREAT UR: 3347.61 MG/G CR (ref 0–25)

## 2025-04-24 ENCOUNTER — PATIENT OUTREACH (OUTPATIENT)
Dept: CARE COORDINATION | Facility: CLINIC | Age: 60
End: 2025-04-24
Payer: COMMERCIAL

## 2025-04-24 NOTE — LETTER
[facility]  Patient Centered Plan of Care  About Me:        Patient Name:  Missy Michel    YOB: 1965  Age:         59 year old   Madhu MRN:    [id number] Telephone Information:  Home Phone [phone]   Mobile [phone]       Address:  [address] [place] [address] Email address:  [email or web address]      Emergency Contact(s)    Name Relationship Lgl Grd Work Phone Home Phone Mobile Phone   1. MC MICHEL Spouse    [phone]   2. MARISELA MICHEL Son    [phone]   3. ARIANNA MICHEL Son    [phone]           Primary language:  ong     needed? Yes   [employer]:  [phone] op. 1  Other communication barriers:No data recorded  Preferred Method of Communication:     Current living arrangement: I live in a private home with family; I live in a private home with spouse (live wiht  and child)    Mobility Status/ Medical Equipment: Independent        Health Maintenance  Health Maintenance Reviewed: No data recorded    My Access Plan  Medical Emergency 911   Primary Clinic Line [facility] - [phone]   24 Hour Appointment Line [phone] or  [address] [place] [address] [phone]) (toll-free)   24 Hour Nurse Line [phone] (toll-free)   Preferred Urgent Care [facility], [phone]     Preferred Hospital [facility]  [phone]     Preferred Pharmacy [facility] - [place], [place] - [address] [place] [address] Pky     Behavioral Health Crisis Line The National Suicide Prevention Lifeline at 1-438.485.2794 or Text/Call 8           My Care Team Members  Patient Care Team         Relationship Specialty Notifications Start End    Myranda Loya MD PCP - General Family Medicine  5/22/24     Referring to NEPHRO    Phone: [phone] Fax: [phone]         [address] [facility] [address]    Myranda Loya MD Assigned PCP   6/16/21     Phone: [phone] Fax:  [phone]         [address] [place] [address]    Fariba Laurent CHW Community Health Worker Primary Care - CC  3/15/24     Phone: [phone] Fax: [phone]         [address] [place] [address]    Carmen Enciso, RN Lead Care Coordinator Primary Care - CC Admissions 5/7/24     Sha Ray, PharmD Pharmacist Pharmacist  12/13/24     Phone: [phone]          [facility] [address] [place] [address]    Sha Ray, PharmD Assigned [place] Pharmacist   12/23/24     Phone: [phone]          [facility] [address] [place] [address]                My Care Plans  Self Management and Treatment Plan    Care Plan  Care Plan: Medical       Problem: HP GENERAL PROBLEM       Goal: I would like to review planof care with PCP within 1 month       Start Date: 12/20/2024    Recent Progress: 90%    Note:     Barriers: Language  Strengths: family  Patient expressed understanding of goal: yes  Action steps to achieve this goal:  I attended appt with PCP on January 17, 2025  I will follow up with my doctor  to discuss care gaps and plan of care.    I will talk to CC RN   for support with plan of care.                 Goal: General Goal - please update text                           Care Plan: Medication Regimen       Problem: Medication Adherence       Goal: Consistently take Medications as Prescribed                             Action Plans on File:                       Advance Care Plans/Directives:   Advanced Care Plan/Directives on file: No    Discussed with patient/caregiver(s): Declined Further Information               My Medical and Care Information  Problem List   Patient Active Problem List   Diagnosis    Hypertension goal BP (blood pressure) < 130/80    Hyperlipidemia LDL goal <130    Overweight (BMI 25.0-29.9)    Cyst of kidney, acquired    Prediabetes    IgA nephropathy    Anemia of renal disease    Chronic kidney disease, stage 4 (severe) (H)    External hemorrhoids     Secondary renal hyperparathyroidism    Hyperuricemia    Acute bilateral back pain, unspecified back location    Elevated lipoprotein(a)      Current Medications:  Please refer to the most recent medication list provided to you by your medical team and reach out to your provider with any questions or to make any corrections.    Care Coordination Start Date: 3/7/2024   Frequency of Care Coordination: monthly, more frequently as needed     Form Last Updated: 04/24/2025

## 2025-04-24 NOTE — PROGRESS NOTES
Clinic Care Coordination Contact  Care Coordination Clinician Chart Review    Situation: Patient chart reviewed by Care Coordinator.       Background: Care Coordination Program started: 3/7/2024. Initial assessment completed and patient-centered care plan(s) were developed with participation from patient. Lead CC handed patient off to CHW for continued outreaches.       Assessment: Per chart review, patient outreach completed by CC - due for CHW outreach on 4/28/25  .  Patient is actively working to accomplish goal(s). Patient's goal(s) appropriate and relevant at this time. Patient is due for updated Plan of Care.  Assessments will be completed annually or as needed/with change of patient status.      Care Plan: Medical       Problem: HP GENERAL PROBLEM       Goal: I would like to review planof care with PCP within 1 month       Start Date: 12/20/2024    Recent Progress: 90%    Note:     Barriers: Language  Strengths: family  Patient expressed understanding of goal: yes  Action steps to achieve this goal:  I attended appt with PCP on January 17, 2025  I will follow up with my doctor  to discuss care gaps and plan of care.    I will talk to CC RN   for support with plan of care.                 Goal: General Goal - please update text                           Care Plan: Medication Regimen       Problem: Medication Adherence       Goal: Consistently take Medications as Prescribed                                Plan/Recommendations: The patient will continue working with Care Coordination to achieve goal(s) as above. CHW will continue outreaches at minimum every 30 days and will involve Lead CC as needed or if patient is ready to move to Maintenance. Lead CC will continue to monitor CHW outreaches and patient's progress to goal(s) every 6 weeks.     Plan of Care updated and sent to patient: Yes, via [employer]

## 2025-04-28 ENCOUNTER — PATIENT OUTREACH (OUTPATIENT)
Dept: CARE COORDINATION | Facility: CLINIC | Age: 60
End: 2025-04-28
Payer: COMMERCIAL

## 2025-04-28 NOTE — PROGRESS NOTES
4/28/2025  Clinic Care Coordination Contact  [facility]/Voicemail    Clinical Data: Care Coordinator Outreach    Outreach Documentation Number of Outreach Attempt   4/28/2025  10:16 AM 1     Samia ; Krish ID #[id number]     Left message on patient's voicemail with call back information and requested return call.    Plan: Care Coordinator will try to reach patient again in 10 business days.   2nd attempt 5-7-25    Fariba Laurent  Community Health Worker  [facility] Care Coordination  [email or web address]   Office: [phone]  Fax: [phone]

## 2025-05-07 ENCOUNTER — RESULTS FOLLOW-UP (OUTPATIENT)
Dept: FAMILY MEDICINE | Facility: CLINIC | Age: 60
End: 2025-05-07

## 2025-05-07 ENCOUNTER — APPOINTMENT (OUTPATIENT)
Dept: INTERPRETER SERVICES | Facility: CLINIC | Age: 60
End: 2025-05-07
Payer: COMMERCIAL

## 2025-05-07 ENCOUNTER — PATIENT OUTREACH (OUTPATIENT)
Dept: CARE COORDINATION | Facility: CLINIC | Age: 60
End: 2025-05-07

## 2025-05-07 DIAGNOSIS — E78.5 HYPERLIPIDEMIA LDL GOAL <130: Primary | ICD-10-CM

## 2025-05-07 NOTE — PROGRESS NOTES
5/7/2025  Clinic Care Coordination Contact  [facility]/Voicemail    Clinical Data: Care Coordinator Outreach    Outreach Documentation Number of Outreach Attempt   4/28/2025  10:16 AM 1   5/7/2025   3:42 PM 2     [facility] Samia : Ag Desouza     Left message on patient's voicemail with call back information and requested return call.    Plan: Care Coordinator routed to CC RN to review chart before sending disenrollment letter with care coordinator contact information via mail.     Care Coordinator will wait for CC RN to review chart.    Patient has upcoming appt with PharmD and PCP  Patient has MTM appt with PharmD on 5-16-25  Patient has AWV appt on 8-7-25     Fariba Laurent  Community Health Worker  [facility]  Clinic Care Coordination  [email or web address]   Office: [phone]  Fax: [phone]

## 2025-05-13 NOTE — RESULT ENCOUNTER NOTE
Please let Missy know:    Her cholesterol is still too high, though better than before. Continue the Repatha. I would also like her to see a cardiologist to discuss her cholesterol management. I put in a referral to the lipid clinic for her.     Kidney function is still quite low. Has she been able to get in to see nephrology. Can we help her schedule?

## 2025-05-14 ENCOUNTER — PATIENT OUTREACH (OUTPATIENT)
Dept: CARE COORDINATION | Facility: CLINIC | Age: 60
End: 2025-05-14
Payer: COMMERCIAL

## 2025-05-14 ENCOUNTER — APPOINTMENT (OUTPATIENT)
Dept: INTERPRETER SERVICES | Facility: CLINIC | Age: 60
End: 2025-05-14
Payer: COMMERCIAL

## 2025-05-19 ENCOUNTER — PATIENT OUTREACH (OUTPATIENT)
Dept: CARE COORDINATION | Facility: CLINIC | Age: 60
End: 2025-05-19
Payer: COMMERCIAL

## 2025-05-19 ENCOUNTER — APPOINTMENT (OUTPATIENT)
Dept: INTERPRETER SERVICES | Facility: CLINIC | Age: 60
End: 2025-05-19
Payer: COMMERCIAL

## 2025-05-19 NOTE — TELEPHONE ENCOUNTER
Attempt #2. No answer. LVM to call clinic and ask to speak to a nurse. Please relay message below from Dr. Loya upon return call.      Jacinto RICHARDS RN  [facility]

## 2025-05-21 NOTE — TELEPHONE ENCOUNTER
"Writer took incoming call back from patient regarding missed call from clinic below. Dr. Loya' message relayed to patient.    Per patient, \" I cannot afford to the co-pays for the specialty appointments (Cardiology or Nephrology) right now. I also work Monday - Friday 5 AM-4PM. The best time to call be back is around 4:30 PM.\"    Writer will route message back to Dr. Loya and CC, CHW Fariba Laurent, as it appears Aun as also been trying to contact patient.    Trenton Barone, NUN, RN, PHN   [facility]        "

## 2025-05-22 ENCOUNTER — PATIENT OUTREACH (OUTPATIENT)
Dept: NURSING | Facility: CLINIC | Age: 60
End: 2025-05-22
Payer: COMMERCIAL

## 2025-05-22 NOTE — PROGRESS NOTES
"Clinic Care Coordination Contact  Follow Up Progress Note      Assessment: RN CC spoke with pt with support of interpretive services   Pt has a high deductible out of state medical plan through her employer  MANE MCDONALD reviewed the card in media from 2024 - and shared with pt     Endorsed scheduling visit with speciality based upon PCP recommendations and to follow up with [facility] billing for payment plan if needed or PA for services to support pt engagement    Pt was very hesitant to scheduled   RN CC therapeutic communication to engaged   Pt discussed consideration for referral \"to [facility]\"  RN CC noted unclear about coverage and can only help with [facility] billing about payment plan, johnathan care, etc     Pt was open to scheduling visits     RN CC facilitated scheduling Cardiology visit for Ana M 10 th at 3:20 at [facility]     RN CC fasciated scheduling Nephrology visit - they are booking out several months  RN CC requested review to Thursday Jan 15th - added to wait list for all [place] providers  - She will get a txt message if they can see her sooner   Instructed her to call them if she gets a message so they can schedule visit     Also needs to have labs - [facility] lab schedule not out that far,    Other Nephrology options they recommend are   Associated Nephrology consultant - [phone]  [facility] [phone]  Kidney specialist of [phone]    Will need to check with her insurance to see if they are covered               Care Gaps:    Health Maintenance Due   Topic Date Due    COLORECTAL CANCER SCREENING  03/26/2024    PAP FOLLOW-UP  01/17/2025       {Care Gaps:611655}    Care Plans  {Care Plan:579547}    Intervention/Education provided during outreach: ***          {cc resource consent:759062}    Plan:   ***  Care Coordinator will follow up in ***  "

## 2025-05-22 NOTE — PROGRESS NOTES
5/22/2025  Clinic Care Coordination Contact  Care Team Conversations    Consulted with CC RN Lead regarding follow up due to unsuccessful.  Plan: CC RN will follow up with patient.    Fariba Laurent  Community Health Worker  [facility] Care Coordination  [email or web address]   Office: [phone]  Fax: [phone]

## 2025-05-22 NOTE — RESULT ENCOUNTER NOTE
Would she maybe want to try an e-consult for lipid management with cardiology for medication recommendations? I think that might be less expensive.

## 2025-05-27 ENCOUNTER — PATIENT OUTREACH (OUTPATIENT)
Dept: NURSING | Facility: CLINIC | Age: 60
End: 2025-05-27
Payer: COMMERCIAL

## 2025-06-10 ENCOUNTER — OFFICE VISIT (OUTPATIENT)
Dept: CARDIOLOGY | Facility: CLINIC | Age: 60
End: 2025-06-10
Attending: FAMILY MEDICINE
Payer: COMMERCIAL

## 2025-06-10 VITALS
OXYGEN SATURATION: 99 % | SYSTOLIC BLOOD PRESSURE: 176 MMHG | HEIGHT: 60 IN | RESPIRATION RATE: 16 BRPM | BODY MASS INDEX: 28.37 KG/M2 | HEART RATE: 55 BPM | DIASTOLIC BLOOD PRESSURE: 84 MMHG | WEIGHT: 144.5 LBS

## 2025-06-10 DIAGNOSIS — E78.5 HYPERLIPIDEMIA LDL GOAL <130: Primary | ICD-10-CM

## 2025-06-10 DIAGNOSIS — N18.4 CHRONIC KIDNEY DISEASE, STAGE 4 (SEVERE) (H): ICD-10-CM

## 2025-06-10 DIAGNOSIS — I10 HYPERTENSION GOAL BP (BLOOD PRESSURE) < 130/80: ICD-10-CM

## 2025-06-10 PROCEDURE — G2211 COMPLEX E/M VISIT ADD ON: HCPCS | Performed by: INTERNAL MEDICINE

## 2025-06-10 PROCEDURE — 3077F SYST BP >= 140 MM HG: CPT | Performed by: INTERNAL MEDICINE

## 2025-06-10 PROCEDURE — 99204 OFFICE O/P NEW MOD 45 MIN: CPT | Performed by: INTERNAL MEDICINE

## 2025-06-10 PROCEDURE — 3079F DIAST BP 80-89 MM HG: CPT | Performed by: INTERNAL MEDICINE

## 2025-06-10 RX ORDER — NIFEDIPINE 60 MG/1
TABLET, EXTENDED RELEASE ORAL
COMMUNITY
Start: 2024-12-03

## 2025-06-10 RX ORDER — DOXAZOSIN 4 MG/1
4 TABLET ORAL AT BEDTIME
Qty: 90 TABLET | Refills: 2 | Status: SHIPPED | OUTPATIENT
Start: 2025-06-10
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
## 2025-06-10 NOTE — PATIENT INSTRUCTIONS
Increase doxazosin to 4 mg daily at bedtime to see if we can get your blood pressure better  Continue the Repatha and plan to get your lipids rechecked at the end of June to see where your cholesterol levels are running.  Set up coronary artery calcium scan to see if there is evidence of cholesterol in the heart arteries.

## 2025-06-17 ENCOUNTER — PATIENT OUTREACH (OUTPATIENT)
Dept: CARE COORDINATION | Facility: CLINIC | Age: 60
End: 2025-06-17
Payer: COMMERCIAL

## 2025-06-17 NOTE — PROGRESS NOTES
6/17/2025  Clinic Care Coordination Contact  [facility]    Clinical Data: Care Coordinator Outreach    Outreach Documentation Number of Outreach Attempt   5/7/2025   3:42 PM 2   5/27/2025   3:55 PM 1   6/17/2025  11:27 AM 1       [place] : Moses ID# [id number]    Unable to leave a message due to: No VM    Plan: Care Coordinator will try to reach patient again in 10 business days. 7-2-25    Called and spoke with patient's son Ellen emergency contact for support to call billing dept to set up payment plan if she is not able to pay for co pays.  Son stated the insurance is through her work place,  Son agreed to support pt to call to billing dept regarding payment plan if she can't pay for co pays or medical bills.    Fariba Laurent  Community Health Worker  [facility]  Clinic Care Coordination  [email or web address]   Office: [phone]  Fax: [phone]

## 2025-06-23 ENCOUNTER — NURSE TRIAGE (OUTPATIENT)
Dept: NURSING | Facility: CLINIC | Age: 60
End: 2025-06-23
Payer: COMMERCIAL

## 2025-06-23 NOTE — TELEPHONE ENCOUNTER
"[facility]  services utilized for triage call.    Nurse Triage SBAR    Is this a 2nd Level Triage? NO    Situation:  Pelvic pain    Background: Inner left thigh pain for the past 2 weeks. Intermittent pain per patient. States cannot determine where specifically pain is located. States where thigh meets pelvic area. \"Very, sharp, deep pain.\" When pressing on area, can't always feel it. Hurts the worst when sitting down. States when walking, no pain. \"Uncomfortable.\" Rating pain currently 6-7/10. Consistent pain when sitting.     Assessment: Inner left thigh pain. Intermittent pain per patient. States cannot determine where specifically pain is located. States where thigh meets pelvic area. \"Very, sharp, deep pain.\" \"Uncomfortable.\" Rating pain currently 6-7/10. States notices pain when sitting, but pain goes away when standing.     Protocol Recommended Disposition:   See PCP Within 24 Hours    Recommendation: Care advice given. Patient agreeable to plan and verbalizes understanding.      Does the patient meet one of the following criteria for ADS visit consideration? 16+ years old, with an [facility] PCP     TIP  Providers, please consider if this condition is appropriate for management at one of our Acute and Diagnostic Services sites.     If patient is a good candidate, please use dotphrase <dot>triageresponse and select Refer to ADS to document.    Reason for Disposition   [1] MODERATE (e.g., interferes with normal activities) pelvic pain AND [2] pain comes and goes (cramps) AND [3] present > 24 hours    Additional Information   Negative: Shock suspected (e.g., cold/pale/clammy skin, too weak to stand, low BP, rapid pulse)   Negative: Looks like a broken bone or dislocated joint (e.g., crooked or deformed)   Negative: Sounds like a life-threatening emergency to the triager   Negative: Leg swelling is main symptom   Negative: Pregnant   Negative: Postpartum (from 0 to 6 weeks after delivery)   Negative: Followed a " leg injury   Negative: Back pain radiating (shooting) into leg(s)   Negative: Knee pain is main symptom   Negative: Ankle pain is main symptom   Negative: Chest pain   Negative: Difficulty breathing   Negative: Entire foot is cool or blue in comparison to other side   Negative: Unable to walk   Negative: [1] Red area or streak AND [2] fever   Negative: Patient sounds very sick or weak to the triager   Negative: [1] Swollen joint AND [2] fever   Negative: [1] Cast on leg or ankle AND [2] now increased pain   Negative: [1] SEVERE pain (e.g., excruciating, unable to do any normal activities) AND [2] not improved after 2 hours of pain medicine   Negative: [1] Thigh, calf, or ankle swelling AND [2] only 1 side   Negative: [1] Thigh, calf, or ankle swelling AND [2] bilateral AND [3] 1 side is more swollen   Negative: [1] Thigh or calf pain AND [2] only 1 side AND [3] present > 1 hour (Exception: Chronic unchanged pain.)   Negative: [1] SEVERE pelvic pain (e.g., excruciating) AND [2] vomiting   Negative: [1] SEVERE pelvic pain AND [2] present > 1 hour   Negative: Passed out (i.e., lost consciousness, collapsed and was not responding)   Negative: Sounds like a life-threatening emergency to the triager   Negative: Abdominal (stomach) pain is main concern   Negative: Hip pain is main concern   Negative: Rectal pain is main concern   Negative: Urination pain is main concern (pain with passing urine)   Negative: [1] Pelvic pain AND [2] pregnant < 20 weeks   Negative: [1] Pelvic pain AND [2] pregnant 20 or more weeks   Negative: Postpartum (from 0 to 6 weeks after delivery)   Negative: Vulvar (external genital area) pain or symptoms (e.g., dryness, sores, redness, blisters, bumps) is main concern   Negative: Pain associated with known hernia or new-onset hernia suspected (reducible bulge in groin/abdomen)   Negative: Followed an abdomen (stomach) injury   Negative: Followed a genital area injury (e.g., vagina, vulva)   Negative:  Menstrual cramps is main concern   Negative: SEVERE vaginal bleeding (e.g., soaking 2 pads or tampons per hour and present 2 or more hours)   Negative: Patient sounds very sick or weak to the triager   Negative: [1] MILD-MODERATE pain AND [2] constant AND [3] present > 2 hours   Negative: Fever > 103 F (39.4 C)   Negative: [1] Fever > 101 F (38.3 C) AND [2] age > 60 years   Negative: [1] Fever > 100.0 F (37.8 C) AND [2] bedridden (e.g., CVA,chronic illness, recovering from surgery)   Negative: [1] Fever > 100.0 F (37.8 C) AND [2] diabetes mellitus or weak immune system (e.g., HIV positive, cancer chemo, splenectomy, organ transplant, chronic steroids)   Negative: [1] SEVERE pelvic pain AND [2] present < 1 hour    Protocols used: Pelvic Pain - Female-A-AH, Leg Pain-A-AH

## 2025-06-24 ENCOUNTER — OFFICE VISIT (OUTPATIENT)
Dept: FAMILY MEDICINE | Facility: CLINIC | Age: 60
End: 2025-06-24
Payer: COMMERCIAL

## 2025-06-24 VITALS
RESPIRATION RATE: 15 BRPM | BODY MASS INDEX: 28.07 KG/M2 | HEIGHT: 60 IN | DIASTOLIC BLOOD PRESSURE: 74 MMHG | TEMPERATURE: 98.1 F | HEART RATE: 57 BPM | WEIGHT: 143 LBS | OXYGEN SATURATION: 98 % | SYSTOLIC BLOOD PRESSURE: 138 MMHG

## 2025-06-24 DIAGNOSIS — Z12.4 CERVICAL CANCER SCREENING: ICD-10-CM

## 2025-06-24 DIAGNOSIS — R10.2 PELVIC PAIN IN FEMALE: Primary | ICD-10-CM

## 2025-06-24 DIAGNOSIS — R10.32 LLQ ABDOMINAL PAIN: ICD-10-CM

## 2025-06-24 LAB
ALBUMIN UR-MCNC: >=300 MG/DL
APPEARANCE UR: CLEAR
BACTERIA #/AREA URNS HPF: ABNORMAL /HPF
BILIRUB UR QL STRIP: NEGATIVE
CLUE CELLS: ABNORMAL
COLOR UR AUTO: YELLOW
ERYTHROCYTE [DISTWIDTH] IN BLOOD BY AUTOMATED COUNT: 13.1 % (ref 10–15)
GLUCOSE UR STRIP-MCNC: NEGATIVE MG/DL
HCT VFR BLD AUTO: 30 % (ref 35–47)
HGB BLD-MCNC: 9.8 G/DL (ref 11.7–15.7)
HGB UR QL STRIP: ABNORMAL
KETONES UR STRIP-MCNC: NEGATIVE MG/DL
LEUKOCYTE ESTERASE UR QL STRIP: NEGATIVE
MCH RBC QN AUTO: 27.8 PG (ref 26.5–33)
MCHC RBC AUTO-ENTMCNC: 32.7 G/DL (ref 31.5–36.5)
MCV RBC AUTO: 85 FL (ref 78–100)
NITRATE UR QL: NEGATIVE
PH UR STRIP: 7.5 [PH] (ref 5–8)
PLATELET # BLD AUTO: 191 10E3/UL (ref 150–450)
RBC # BLD AUTO: 3.52 10E6/UL (ref 3.8–5.2)
RBC #/AREA URNS AUTO: ABNORMAL /HPF
SP GR UR STRIP: 1.02 (ref 1–1.03)
SQUAMOUS #/AREA URNS AUTO: ABNORMAL /LPF
TRICHOMONAS, WET PREP: ABNORMAL
UROBILINOGEN UR STRIP-ACNC: 0.2 E.U./DL
WBC # BLD AUTO: 5.6 10E3/UL (ref 4–11)
WBC #/AREA URNS AUTO: ABNORMAL /HPF
WBC'S/HIGH POWER FIELD, WET PREP: ABNORMAL
YEAST, WET PREP: ABNORMAL

## 2025-06-24 PROCEDURE — 3075F SYST BP GE 130 - 139MM HG: CPT | Performed by: PHYSICIAN ASSISTANT

## 2025-06-24 PROCEDURE — 3078F DIAST BP <80 MM HG: CPT | Performed by: PHYSICIAN ASSISTANT

## 2025-06-24 PROCEDURE — G2211 COMPLEX E/M VISIT ADD ON: HCPCS | Performed by: PHYSICIAN ASSISTANT

## 2025-06-24 PROCEDURE — 87491 CHLMYD TRACH DNA AMP PROBE: CPT | Performed by: PHYSICIAN ASSISTANT

## 2025-06-24 PROCEDURE — 81001 URINALYSIS AUTO W/SCOPE: CPT | Performed by: PHYSICIAN ASSISTANT

## 2025-06-24 PROCEDURE — 85027 COMPLETE CBC AUTOMATED: CPT | Performed by: PHYSICIAN ASSISTANT

## 2025-06-24 PROCEDURE — 87210 SMEAR WET MOUNT SALINE/INK: CPT | Performed by: PHYSICIAN ASSISTANT

## 2025-06-24 PROCEDURE — 36415 COLL VENOUS BLD VENIPUNCTURE: CPT | Performed by: PHYSICIAN ASSISTANT

## 2025-06-24 PROCEDURE — 87624 HPV HI-RISK TYP POOLED RSLT: CPT | Performed by: PHYSICIAN ASSISTANT

## 2025-06-24 PROCEDURE — 87591 N.GONORRHOEAE DNA AMP PROB: CPT | Performed by: PHYSICIAN ASSISTANT

## 2025-06-24 PROCEDURE — 99214 OFFICE O/P EST MOD 30 MIN: CPT | Performed by: PHYSICIAN ASSISTANT

## 2025-06-24 PROCEDURE — T1013 SIGN LANG/ORAL INTERPRETER: HCPCS

## 2025-06-24 NOTE — PROGRESS NOTES
Assessment & Plan     Pelvic pain in female  -labs pending.  US order placed, scheduled with staff   -ok for heating pads to help with pain, tylenol PRN  -discussed alarm signs and symptoms to monitor for and discussed when to be reevaluated in the UC or ED   -I will notify pt of results when available   - CBC with platelets; Future  - UA with Microscopic reflex to Culture - Clinic Collect  - Chlamydia & Gonorrhea by PCR, G[facility]/Range - Clinic Collect  - Wet prep - Clinic Collect  - US Pelvic Complete with Transvaginal; Future  - HPV and Gynecologic Cytology Panel - Recommended Age 30-65 Years    Cervical cancer screening  -pap today.  I will notify pt of results when available      BMI  Estimated body mass index is 27.93 kg/m  as calculated from the following:    Height as of this encounter: 1.524 m (5').    Weight as of this encounter: 64.9 kg (143 lb).             Subjective   Missy is a 60 year old, presenting for the following health issues:  Pain (Pelvic pain)      6/24/2025     3:02 PM   Additional Questions   Roomed by Justina   Accompanied by      -LLQ abdominal pain  x 3 weeks   -nothing makes the pain better, nothing makes the pain worse.  Mentions that standing makes the pain somewhat less, worse with sitting or pressing on the area  -tried tylenol but not helpful  -no fevers  -appetite is normal   -BMs have been soft, regular    -does feel like she cannot empty her bladder, wonders if she has a bladder infection  -no vaginal bleeding or discharge    Musculoskeletal Problem    History of Present Illness       Reason for visit:  Pelvic pain  Symptom onset:  3-4 weeks ago  Symptoms include:  Painful and deep  Symptom intensity:  Moderate  Symptom progression:  Staying the same  Had these symptoms before:  No   She is taking medications regularly.            Objective    /74   Pulse 57   Temp 98.1  F (36.7  C) (Oral)   Resp 15   Ht 1.524 m (5')   Wt 64.9 kg (143 lb)   LMP  (LMP Unknown)    SpO2 98%   BMI 27.93 kg/m    Body mass index is 27.93 kg/m .  Physical Exam   GENERAL: alert and no distress  NECK: no adenopathy, no asymmetry, masses, or scars  RESP: lungs clear to auscultation - no rales, rhonchi or wheezes  CV: regular rate and rhythm, normal S1 S2, no S3 or S4, no murmur, click or rub, no peripheral edema  ABDOMEN: soft, nontender, no hepatosplenomegaly, no masses and bowel sounds normal   (female): normal female external genitalia, normal urethral meatus , normal vaginal mucosa, normal cervix, adnexae, and uterus without masses., and speculum exam is uncomfortable for the pt, causing pain in the left pelvis.  Bimanual without masses, some tenderness over the left.    MS: no gross musculoskeletal defects noted, no edema          Signed Electronically by: Ashley Parish PA-C

## 2025-06-25 ENCOUNTER — RESULTS FOLLOW-UP (OUTPATIENT)
Dept: FAMILY MEDICINE | Facility: CLINIC | Age: 60
End: 2025-06-25
Payer: COMMERCIAL

## 2025-06-25 LAB
C TRACH DNA SPEC QL PROBE+SIG AMP: NEGATIVE
HPV HR 12 DNA CVX QL NAA+PROBE: NEGATIVE
HPV16 DNA CVX QL NAA+PROBE: NEGATIVE
HPV18 DNA CVX QL NAA+PROBE: NEGATIVE
HUMAN PAPILLOMA VIRUS FINAL DIAGNOSIS: NORMAL
N GONORRHOEA DNA SPEC QL NAA+PROBE: NEGATIVE
SPECIMEN TYPE: NORMAL

## 2025-07-01 ENCOUNTER — APPOINTMENT (OUTPATIENT)
Dept: INTERPRETER SERVICES | Facility: CLINIC | Age: 60
End: 2025-07-01
Payer: COMMERCIAL

## 2025-07-01 NOTE — TELEPHONE ENCOUNTER
1st: Left voicemail requesting patient return call. Please relay clinician message should patient call back.    Tabitha Jarrett RN  [facility] : Ag   Harpreet Jeffers (Attending) <<----- Click to Select Surgeon

## 2025-07-02 ENCOUNTER — PATIENT OUTREACH (OUTPATIENT)
Dept: CARE COORDINATION | Facility: CLINIC | Age: 60
End: 2025-07-02

## 2025-07-02 ENCOUNTER — LAB (OUTPATIENT)
Dept: LAB | Facility: CLINIC | Age: 60
End: 2025-07-02
Payer: COMMERCIAL

## 2025-07-02 DIAGNOSIS — N18.4 CHRONIC KIDNEY DISEASE, STAGE 4 (SEVERE) (H): Primary | ICD-10-CM

## 2025-07-02 PROCEDURE — 82570 ASSAY OF URINE CREATININE: CPT

## 2025-07-02 PROCEDURE — 80048 BASIC METABOLIC PNL TOTAL CA: CPT

## 2025-07-02 PROCEDURE — 36415 COLL VENOUS BLD VENIPUNCTURE: CPT

## 2025-07-02 PROCEDURE — 82043 UR ALBUMIN QUANTITATIVE: CPT

## 2025-07-02 NOTE — Clinical Note
2nd attempt unsuccessful Review chart Spoke with son and son agreed to help call [employer] insurance

## 2025-07-02 NOTE — PROGRESS NOTES
7/2/2025  Clinic Care Coordination Contact  [facility]    Clinical Data: Care Coordinator Outreach    Outreach Documentation Number of Outreach Attempt   5/27/2025   3:55 PM 1   6/17/2025  11:27 AM 1   7/2/2025   3:59 PM 2       Unable to leave a message due to: No Vm    Plan: Care Coordinator routed to CC RN  to review chart before sending disenrollment letter with care coordinator contact information via mail.     Care Coordinator will wait for CC RN to review chart     Fariba Laurent  Community Health Worker  [facility] Care Coordination  [email or web address]   Office: [phone]  Fax: [phone]

## 2025-07-03 LAB
ANION GAP SERPL CALCULATED.3IONS-SCNC: 12 MMOL/L (ref 7–15)
BUN SERPL-MCNC: 68.9 MG/DL (ref 6–20)
CALCIUM SERPL-MCNC: 8.1 MG/DL (ref 8.8–10.4)
CHLORIDE SERPL-SCNC: 107 MMOL/L (ref 98–107)
CREAT SERPL-MCNC: 4.64 MG/DL (ref 0.51–0.95)
CREAT UR-MCNC: 58.3 MG/DL
EGFRCR SERPLBLD CKD-EPI 2021: 10 ML/MIN/1.73M2
GLUCOSE SERPL-MCNC: 98 MG/DL (ref 70–99)
HCO3 SERPL-SCNC: 22 MMOL/L (ref 22–29)
MICROALBUMIN UR-MCNC: 1240 MG/L
MICROALBUMIN/CREAT UR: 2126.93 MG/G CR (ref 0–25)
POTASSIUM SERPL-SCNC: 5.3 MMOL/L (ref 3.4–5.3)
SODIUM SERPL-SCNC: 141 MMOL/L (ref 135–145)

## 2025-07-07 NOTE — TELEPHONE ENCOUNTER
Ashley Parish PA-C to [facility] (Selected Message)  JJ    6/30/25 12:56 PM  Result Note  Please call pt and let her know that her ultrasound looked ok.  Small fibroids but nothing to explain her symptoms.  Please have her follow up if sxs are not improving or certainly with any worsening.  Ashley Parish PA-C on 6/30/2025 at 12:56 PM      Spoke to patient on the phone to relay provider test result and recommendation above.  Patient endorsed the pain is not always present but comes and go not better not worsening.  Verbalized understood recommendation to reach out if symptoms worsening.    Meet Andre RN   [facility]

## 2025-07-08 ENCOUNTER — PATIENT OUTREACH (OUTPATIENT)
Dept: CARE COORDINATION | Facility: CLINIC | Age: 60
End: 2025-07-08
Payer: COMMERCIAL

## 2025-07-08 ENCOUNTER — APPOINTMENT (OUTPATIENT)
Dept: INTERPRETER SERVICES | Facility: CLINIC | Age: 60
End: 2025-07-08
Payer: COMMERCIAL

## 2025-07-08 NOTE — PROGRESS NOTES
Son reports that she does have insurance   Has deductible  Most days she works from 8-4;30 - she usually has Fridays off so those are the best days to reach her      Clinic Care Coordination Contact  Follow Up Progress Note      Assessment: RN CC outreach to pt to support engagement  Son answered phone - pt was at work   He notes that Fridays are best days to reach pt  There was not an active consent to communicate on file for son, so I wasn't able to address any goal progression   Will have CHW reach out for update to pt on Friday   RN CC will be available if needed   Plan:   Patient will schedule and attend recommended follow up visits with speciality providers and primary care provider.  Community Health Worker to outreach per standard work and updated on goal progression  RN CC will review in 4-6 weeks to support ongoing recommendations and plan of care will be available sooner if needed.

## 2025-08-07 ENCOUNTER — OFFICE VISIT (OUTPATIENT)
Dept: FAMILY MEDICINE | Facility: CLINIC | Age: 60
End: 2025-08-07
Payer: COMMERCIAL

## 2025-08-07 ENCOUNTER — ORDERS ONLY (AUTO-RELEASED) (OUTPATIENT)
Dept: FAMILY MEDICINE | Facility: CLINIC | Age: 60
End: 2025-08-07

## 2025-08-07 VITALS
WEIGHT: 147 LBS | DIASTOLIC BLOOD PRESSURE: 90 MMHG | HEIGHT: 59 IN | RESPIRATION RATE: 18 BRPM | BODY MASS INDEX: 29.64 KG/M2 | HEART RATE: 56 BPM | SYSTOLIC BLOOD PRESSURE: 193 MMHG | TEMPERATURE: 98.1 F | OXYGEN SATURATION: 99 %

## 2025-08-07 DIAGNOSIS — Z00.00 ROUTINE GENERAL MEDICAL EXAMINATION AT A HEALTH CARE FACILITY: Primary | ICD-10-CM

## 2025-08-07 DIAGNOSIS — Z78.0 ASYMPTOMATIC MENOPAUSAL STATE: ICD-10-CM

## 2025-08-07 DIAGNOSIS — N18.4 CHRONIC KIDNEY DISEASE, STAGE 4 (SEVERE) (H): ICD-10-CM

## 2025-08-07 DIAGNOSIS — R73.03 PREDIABETES: ICD-10-CM

## 2025-08-07 DIAGNOSIS — R10.32 LLQ ABDOMINAL PAIN: ICD-10-CM

## 2025-08-07 DIAGNOSIS — E78.5 HYPERLIPIDEMIA LDL GOAL <100: ICD-10-CM

## 2025-08-07 DIAGNOSIS — I10 HYPERTENSION GOAL BP (BLOOD PRESSURE) < 130/80: ICD-10-CM

## 2025-08-07 DIAGNOSIS — Z12.11 SCREEN FOR COLON CANCER: ICD-10-CM

## 2025-08-07 DIAGNOSIS — E79.0 HYPERURICEMIA: ICD-10-CM

## 2025-08-07 DIAGNOSIS — K59.00 CONSTIPATION, UNSPECIFIED CONSTIPATION TYPE: ICD-10-CM

## 2025-08-07 DIAGNOSIS — E78.5 HYPERLIPIDEMIA LDL GOAL <130: ICD-10-CM

## 2025-08-07 PROBLEM — N28.1 CYST OF KIDNEY, ACQUIRED: Status: RESOLVED | Noted: 2019-08-23 | Resolved: 2025-08-07

## 2025-08-07 LAB
EST. AVERAGE GLUCOSE BLD GHB EST-MCNC: 114 MG/DL
HBA1C MFR BLD: 5.6 % (ref 0–5.6)

## 2025-08-07 RX ORDER — METOPROLOL SUCCINATE 50 MG/1
50 TABLET, EXTENDED RELEASE ORAL DAILY
Qty: 90 TABLET | Refills: 4 | Status: SHIPPED | OUTPATIENT
Start: 2025-08-07

## 2025-08-07 RX ORDER — AMOXICILLIN 250 MG
2 CAPSULE ORAL DAILY PRN
Qty: 100 TABLET | Refills: 1 | Status: SHIPPED | OUTPATIENT
Start: 2025-08-07

## 2025-08-07 SDOH — HEALTH STABILITY: PHYSICAL HEALTH: ON AVERAGE, HOW MANY MINUTES DO YOU ENGAGE IN EXERCISE AT THIS LEVEL?: 30 MIN

## 2025-08-07 SDOH — HEALTH STABILITY: PHYSICAL HEALTH: ON AVERAGE, HOW MANY DAYS PER WEEK DO YOU ENGAGE IN MODERATE TO STRENUOUS EXERCISE (LIKE A BRISK WALK)?: 2 DAYS

## 2025-08-07 ASSESSMENT — SOCIAL DETERMINANTS OF HEALTH (SDOH): HOW OFTEN DO YOU GET TOGETHER WITH FRIENDS OR RELATIVES?: NEVER

## 2025-08-08 ENCOUNTER — APPOINTMENT (OUTPATIENT)
Dept: INTERPRETER SERVICES | Facility: CLINIC | Age: 60
End: 2025-08-08
Payer: COMMERCIAL

## 2025-08-08 ENCOUNTER — RESULTS FOLLOW-UP (OUTPATIENT)
Dept: FAMILY MEDICINE | Facility: CLINIC | Age: 60
End: 2025-08-08
Payer: COMMERCIAL

## 2025-08-08 DIAGNOSIS — N19 UREMIA: ICD-10-CM

## 2025-08-08 DIAGNOSIS — N25.81 SECONDARY RENAL HYPERPARATHYROIDISM: ICD-10-CM

## 2025-08-08 DIAGNOSIS — N18.4 CHRONIC KIDNEY DISEASE, STAGE 4 (SEVERE) (H): Primary | ICD-10-CM

## 2025-08-08 RX ORDER — CALCIUM ACETATE 667 MG/1
667 CAPSULE ORAL
Qty: 90 CAPSULE | Refills: 4 | Status: SHIPPED | OUTPATIENT
Start: 2025-08-08

## 2025-08-11 ENCOUNTER — PATIENT OUTREACH (OUTPATIENT)
Dept: CARE COORDINATION | Facility: CLINIC | Age: 60
End: 2025-08-11
Payer: COMMERCIAL

## 2025-08-11 ENCOUNTER — TELEPHONE (OUTPATIENT)
Dept: NEPHROLOGY | Facility: CLINIC | Age: 60
End: 2025-08-11
Payer: COMMERCIAL

## 2025-08-11 DIAGNOSIS — R80.9 ALBUMINURIA: ICD-10-CM

## 2025-08-11 DIAGNOSIS — N02.B9 IGA NEPHROPATHY: ICD-10-CM

## 2025-08-11 DIAGNOSIS — N18.4 CHRONIC KIDNEY DISEASE, STAGE 4 (SEVERE) (H): Primary | ICD-10-CM

## 2025-08-19 ENCOUNTER — PATIENT OUTREACH (OUTPATIENT)
Dept: CARE COORDINATION | Facility: CLINIC | Age: 60
End: 2025-08-19
Payer: COMMERCIAL

## 2025-08-19 ENCOUNTER — APPOINTMENT (OUTPATIENT)
Dept: INTERPRETER SERVICES | Facility: CLINIC | Age: 60
End: 2025-08-19
Payer: COMMERCIAL

## 2025-08-26 ENCOUNTER — LAB (OUTPATIENT)
Dept: LAB | Facility: CLINIC | Age: 60
End: 2025-08-26
Payer: COMMERCIAL

## 2025-08-26 ENCOUNTER — TELEPHONE (OUTPATIENT)
Dept: TRANSPLANT | Facility: CLINIC | Age: 60
End: 2025-08-26

## 2025-08-26 ENCOUNTER — PATIENT OUTREACH (OUTPATIENT)
Dept: NEPHROLOGY | Facility: CLINIC | Age: 60
End: 2025-08-26

## 2025-08-26 ENCOUNTER — PRE VISIT (OUTPATIENT)
Dept: NEPHROLOGY | Facility: CLINIC | Age: 60
End: 2025-08-26

## 2025-08-26 ENCOUNTER — OFFICE VISIT (OUTPATIENT)
Dept: NEPHROLOGY | Facility: CLINIC | Age: 60
End: 2025-08-26
Attending: FAMILY MEDICINE
Payer: COMMERCIAL

## 2025-08-26 VITALS
SYSTOLIC BLOOD PRESSURE: 166 MMHG | OXYGEN SATURATION: 99 % | HEIGHT: 60 IN | TEMPERATURE: 98.3 F | HEART RATE: 60 BPM | WEIGHT: 141.5 LBS | BODY MASS INDEX: 27.78 KG/M2 | DIASTOLIC BLOOD PRESSURE: 91 MMHG

## 2025-08-26 DIAGNOSIS — N18.5 CKD (CHRONIC KIDNEY DISEASE) STAGE 5, GFR LESS THAN 15 ML/MIN (H): ICD-10-CM

## 2025-08-26 DIAGNOSIS — N02.B9 IGA NEPHROPATHY: ICD-10-CM

## 2025-08-26 DIAGNOSIS — N18.4 CHRONIC KIDNEY DISEASE, STAGE 4 (SEVERE) (H): ICD-10-CM

## 2025-08-26 DIAGNOSIS — N18.5 CHRONIC KIDNEY DISEASE, STAGE V (H): Primary | ICD-10-CM

## 2025-08-26 DIAGNOSIS — N19 UREMIA: ICD-10-CM

## 2025-08-26 DIAGNOSIS — N25.81 SECONDARY RENAL HYPERPARATHYROIDISM: ICD-10-CM

## 2025-08-26 DIAGNOSIS — R80.9 ALBUMINURIA: ICD-10-CM

## 2025-08-26 DIAGNOSIS — N18.5 CHRONIC KIDNEY DISEASE, STAGE V (H): ICD-10-CM

## 2025-08-26 LAB
ALBUMIN MFR UR ELPH: 131 MG/DL
ALBUMIN SERPL BCG-MCNC: 3.6 G/DL (ref 3.5–5.2)
ALBUMIN UR-MCNC: 50 MG/DL
ANION GAP SERPL CALCULATED.3IONS-SCNC: 15 MMOL/L (ref 7–15)
APPEARANCE UR: CLEAR
BASOPHILS # BLD AUTO: <0.03 10E3/UL (ref 0–0.2)
BASOPHILS NFR BLD AUTO: 0.5 %
BILIRUB UR QL STRIP: NEGATIVE
BUN SERPL-MCNC: 58.6 MG/DL (ref 6–20)
CALCIUM SERPL-MCNC: 8.4 MG/DL (ref 8.8–10.4)
CHLORIDE SERPL-SCNC: 99 MMOL/L (ref 98–107)
COLOR UR AUTO: ABNORMAL
CREAT SERPL-MCNC: 4.67 MG/DL (ref 0.51–0.95)
CREAT UR-MCNC: 50.4 MG/DL
CREAT UR-MCNC: 50.5 MG/DL
EGFRCR SERPLBLD CKD-EPI 2021: 10 ML/MIN/1.73M2
EOSINOPHIL # BLD AUTO: 0.22 10E3/UL (ref 0–0.7)
EOSINOPHIL NFR BLD AUTO: 5.6 %
ERYTHROCYTE [DISTWIDTH] IN BLOOD BY AUTOMATED COUNT: 12.2 % (ref 10–15)
GLUCOSE SERPL-MCNC: 104 MG/DL (ref 70–99)
GLUCOSE UR STRIP-MCNC: NEGATIVE MG/DL
HBV CORE AB SERPL QL IA: REACTIVE
HBV SURFACE AB SERPL IA-ACNC: >1000 M[IU]/ML
HBV SURFACE AB SERPL IA-ACNC: REACTIVE M[IU]/ML
HBV SURFACE AG SERPL QL IA: NONREACTIVE
HCO3 SERPL-SCNC: 23 MMOL/L (ref 22–29)
HCT VFR BLD AUTO: 30.8 % (ref 35–47)
HCV AB SERPL QL IA: NONREACTIVE
HGB BLD-MCNC: 10.3 G/DL (ref 11.7–15.7)
HGB UR QL STRIP: NEGATIVE
IMM GRANULOCYTES # BLD: <0.03 10E3/UL
IMM GRANULOCYTES NFR BLD: 0.3 %
KETONES UR STRIP-MCNC: NEGATIVE MG/DL
LEUKOCYTE ESTERASE UR QL STRIP: NEGATIVE
LYMPHOCYTES # BLD AUTO: 1.29 10E3/UL (ref 0.8–5.3)
LYMPHOCYTES NFR BLD AUTO: 33 %
MCH RBC QN AUTO: 28.1 PG (ref 26.5–33)
MCHC RBC AUTO-ENTMCNC: 33.4 G/DL (ref 31.5–36.5)
MCV RBC AUTO: 84.2 FL (ref 78–100)
MICROALBUMIN UR-MCNC: 834 MG/L
MICROALBUMIN/CREAT UR: 1651.49 MG/G CR (ref 0–25)
MONOCYTES # BLD AUTO: 0.37 10E3/UL (ref 0–1.3)
MONOCYTES NFR BLD AUTO: 9.5 %
NEUTROPHILS # BLD AUTO: 2 10E3/UL (ref 1.6–8.3)
NEUTROPHILS NFR BLD AUTO: 51.1 %
NITRATE UR QL: NEGATIVE
NRBC # BLD AUTO: <0.03 10E3/UL
NRBC BLD AUTO-RTO: 0 /100
PH UR STRIP: 6.5 [PH] (ref 5–7)
PHOSPHATE SERPL-MCNC: 4.3 MG/DL (ref 2.5–4.5)
PLATELET # BLD AUTO: 161 10E3/UL (ref 150–450)
POTASSIUM SERPL-SCNC: 4.1 MMOL/L (ref 3.4–5.3)
PROT/CREAT 24H UR: 2.6 MG/MG CR (ref 0–0.2)
RBC # BLD AUTO: 3.66 10E6/UL (ref 3.8–5.2)
RBC URINE: 1 /HPF
SODIUM SERPL-SCNC: 137 MMOL/L (ref 135–145)
SP GR UR STRIP: 1.01 (ref 1–1.03)
SQUAMOUS EPITHELIAL: <1 /HPF
UROBILINOGEN UR STRIP-MCNC: NORMAL MG/DL
WBC # BLD AUTO: 3.91 10E3/UL (ref 4–11)
WBC URINE: <1 /HPF

## 2025-08-26 PROCEDURE — 3079F DIAST BP 80-89 MM HG: CPT | Performed by: INTERNAL MEDICINE

## 2025-08-26 PROCEDURE — 80069 RENAL FUNCTION PANEL: CPT | Performed by: PATHOLOGY

## 2025-08-26 PROCEDURE — 3077F SYST BP >= 140 MM HG: CPT | Performed by: INTERNAL MEDICINE

## 2025-08-26 PROCEDURE — 99205 OFFICE O/P NEW HI 60 MIN: CPT | Performed by: INTERNAL MEDICINE

## 2025-08-26 PROCEDURE — 99213 OFFICE O/P EST LOW 20 MIN: CPT | Performed by: INTERNAL MEDICINE

## 2025-08-26 PROCEDURE — 85025 COMPLETE CBC W/AUTO DIFF WBC: CPT | Performed by: PATHOLOGY

## 2025-08-26 PROCEDURE — 81001 URINALYSIS AUTO W/SCOPE: CPT | Performed by: PATHOLOGY

## 2025-08-26 PROCEDURE — 84156 ASSAY OF PROTEIN URINE: CPT | Performed by: PATHOLOGY

## 2025-08-26 PROCEDURE — 1126F AMNT PAIN NOTED NONE PRSNT: CPT | Performed by: INTERNAL MEDICINE

## 2025-08-26 PROCEDURE — 36415 COLL VENOUS BLD VENIPUNCTURE: CPT | Performed by: PATHOLOGY

## 2025-08-26 ASSESSMENT — PAIN SCALES - GENERAL: PAINLEVEL_OUTOF10: NO PAIN (0)

## 2025-08-27 LAB
GAMMA INTERFERON BACKGROUND BLD IA-ACNC: 0.13 IU/ML
M TB IFN-G BLD-IMP: NEGATIVE
M TB IFN-G CD4+ BCKGRND COR BLD-ACNC: 9.87 IU/ML
MITOGEN IGNF BCKGRD COR BLD-ACNC: 0.02 IU/ML
MITOGEN IGNF BCKGRD COR BLD-ACNC: 0.04 IU/ML
QUANTIFERON MITOGEN: 10 IU/ML
QUANTIFERON NIL TUBE: 0.13 IU/ML
QUANTIFERON TB1 TUBE: 0.15 IU/ML
QUANTIFERON TB2 TUBE: 0.17

## 2025-09-04 ENCOUNTER — PATIENT OUTREACH (OUTPATIENT)
Dept: NEPHROLOGY | Facility: CLINIC | Age: 60
End: 2025-09-04
Payer: COMMERCIAL